# Patient Record
Sex: MALE | Race: OTHER | HISPANIC OR LATINO | URBAN - METROPOLITAN AREA
[De-identification: names, ages, dates, MRNs, and addresses within clinical notes are randomized per-mention and may not be internally consistent; named-entity substitution may affect disease eponyms.]

---

## 2021-05-26 ENCOUNTER — INPATIENT (INPATIENT)
Facility: HOSPITAL | Age: 71
LOS: 1 days | Discharge: HOME | DRG: 439 | End: 2021-05-28
Attending: STUDENT IN AN ORGANIZED HEALTH CARE EDUCATION/TRAINING PROGRAM | Admitting: STUDENT IN AN ORGANIZED HEALTH CARE EDUCATION/TRAINING PROGRAM
Payer: COMMERCIAL

## 2021-05-26 VITALS
OXYGEN SATURATION: 99 % | RESPIRATION RATE: 19 BRPM | TEMPERATURE: 98 F | SYSTOLIC BLOOD PRESSURE: 119 MMHG | HEIGHT: 64.17 IN | DIASTOLIC BLOOD PRESSURE: 73 MMHG | HEART RATE: 56 BPM | WEIGHT: 136.69 LBS

## 2021-05-26 LAB
ALBUMIN SERPL ELPH-MCNC: 3.8 G/DL — SIGNIFICANT CHANGE UP (ref 3.5–5)
ALP SERPL-CCNC: 163 U/L — HIGH (ref 40–120)
ALT FLD-CCNC: 125 U/L DA — HIGH (ref 10–60)
ANION GAP SERPL CALC-SCNC: 5 MMOL/L — SIGNIFICANT CHANGE UP (ref 5–17)
APPEARANCE UR: CLEAR — SIGNIFICANT CHANGE UP
AST SERPL-CCNC: 187 U/L — HIGH (ref 10–40)
BACTERIA # UR AUTO: ABNORMAL /HPF
BASOPHILS # BLD AUTO: 0.04 K/UL — SIGNIFICANT CHANGE UP (ref 0–0.2)
BASOPHILS NFR BLD AUTO: 0.4 % — SIGNIFICANT CHANGE UP (ref 0–2)
BILIRUB SERPL-MCNC: 0.6 MG/DL — SIGNIFICANT CHANGE UP (ref 0.2–1.2)
BILIRUB UR-MCNC: NEGATIVE — SIGNIFICANT CHANGE UP
BUN SERPL-MCNC: 21 MG/DL — HIGH (ref 7–18)
CALCIUM SERPL-MCNC: 9.4 MG/DL — SIGNIFICANT CHANGE UP (ref 8.4–10.5)
CHLORIDE SERPL-SCNC: 103 MMOL/L — SIGNIFICANT CHANGE UP (ref 96–108)
CO2 SERPL-SCNC: 30 MMOL/L — SIGNIFICANT CHANGE UP (ref 22–31)
COLOR SPEC: YELLOW — SIGNIFICANT CHANGE UP
CREAT SERPL-MCNC: 0.78 MG/DL — SIGNIFICANT CHANGE UP (ref 0.5–1.3)
DIFF PNL FLD: ABNORMAL
EOSINOPHIL # BLD AUTO: 0.04 K/UL — SIGNIFICANT CHANGE UP (ref 0–0.5)
EOSINOPHIL NFR BLD AUTO: 0.4 % — SIGNIFICANT CHANGE UP (ref 0–6)
EPI CELLS # UR: ABNORMAL /HPF
GLUCOSE SERPL-MCNC: 131 MG/DL — HIGH (ref 70–99)
GLUCOSE UR QL: NEGATIVE — SIGNIFICANT CHANGE UP
HCT VFR BLD CALC: 41.1 % — SIGNIFICANT CHANGE UP (ref 39–50)
HGB BLD-MCNC: 13.3 G/DL — SIGNIFICANT CHANGE UP (ref 13–17)
IMM GRANULOCYTES NFR BLD AUTO: 0.4 % — SIGNIFICANT CHANGE UP (ref 0–1.5)
KETONES UR-MCNC: NEGATIVE — SIGNIFICANT CHANGE UP
LACTATE SERPL-SCNC: 1.2 MMOL/L — SIGNIFICANT CHANGE UP (ref 0.7–2)
LEUKOCYTE ESTERASE UR-ACNC: NEGATIVE — SIGNIFICANT CHANGE UP
LIDOCAIN IGE QN: HIGH U/L (ref 73–393)
LYMPHOCYTES # BLD AUTO: 1.72 K/UL — SIGNIFICANT CHANGE UP (ref 1–3.3)
LYMPHOCYTES # BLD AUTO: 16.2 % — SIGNIFICANT CHANGE UP (ref 13–44)
MCHC RBC-ENTMCNC: 28.5 PG — SIGNIFICANT CHANGE UP (ref 27–34)
MCHC RBC-ENTMCNC: 32.4 GM/DL — SIGNIFICANT CHANGE UP (ref 32–36)
MCV RBC AUTO: 88 FL — SIGNIFICANT CHANGE UP (ref 80–100)
MONOCYTES # BLD AUTO: 1.05 K/UL — HIGH (ref 0–0.9)
MONOCYTES NFR BLD AUTO: 9.9 % — SIGNIFICANT CHANGE UP (ref 2–14)
NEUTROPHILS # BLD AUTO: 7.7 K/UL — HIGH (ref 1.8–7.4)
NEUTROPHILS NFR BLD AUTO: 72.7 % — SIGNIFICANT CHANGE UP (ref 43–77)
NITRITE UR-MCNC: NEGATIVE — SIGNIFICANT CHANGE UP
NRBC # BLD: 0 /100 WBCS — SIGNIFICANT CHANGE UP (ref 0–0)
PH UR: 6.5 — SIGNIFICANT CHANGE UP (ref 5–8)
PLATELET # BLD AUTO: 251 K/UL — SIGNIFICANT CHANGE UP (ref 150–400)
POTASSIUM SERPL-MCNC: 4.5 MMOL/L — SIGNIFICANT CHANGE UP (ref 3.5–5.3)
POTASSIUM SERPL-SCNC: 4.5 MMOL/L — SIGNIFICANT CHANGE UP (ref 3.5–5.3)
PROT SERPL-MCNC: 9.3 G/DL — HIGH (ref 6–8.3)
PROT UR-MCNC: NEGATIVE — SIGNIFICANT CHANGE UP
RBC # BLD: 4.67 M/UL — SIGNIFICANT CHANGE UP (ref 4.2–5.8)
RBC # FLD: 13.4 % — SIGNIFICANT CHANGE UP (ref 10.3–14.5)
RBC CASTS # UR COMP ASSIST: ABNORMAL /HPF (ref 0–2)
SODIUM SERPL-SCNC: 138 MMOL/L — SIGNIFICANT CHANGE UP (ref 135–145)
SP GR SPEC: 1.01 — SIGNIFICANT CHANGE UP (ref 1.01–1.02)
TROPONIN I SERPL-MCNC: <0.015 NG/ML — SIGNIFICANT CHANGE UP (ref 0–0.04)
UROBILINOGEN FLD QL: 1
WBC # BLD: 10.59 K/UL — HIGH (ref 3.8–10.5)
WBC # FLD AUTO: 10.59 K/UL — HIGH (ref 3.8–10.5)
WBC UR QL: SIGNIFICANT CHANGE UP /HPF (ref 0–5)

## 2021-05-26 PROCEDURE — 76705 ECHO EXAM OF ABDOMEN: CPT | Mod: 26

## 2021-05-26 PROCEDURE — 93010 ELECTROCARDIOGRAM REPORT: CPT

## 2021-05-26 PROCEDURE — 99285 EMERGENCY DEPT VISIT HI MDM: CPT

## 2021-05-26 PROCEDURE — 99223 1ST HOSP IP/OBS HIGH 75: CPT

## 2021-05-26 RX ORDER — SODIUM CHLORIDE 9 MG/ML
1000 INJECTION INTRAMUSCULAR; INTRAVENOUS; SUBCUTANEOUS ONCE
Refills: 0 | Status: COMPLETED | OUTPATIENT
Start: 2021-05-26 | End: 2021-05-26

## 2021-05-26 RX ORDER — ONDANSETRON 8 MG/1
4 TABLET, FILM COATED ORAL ONCE
Refills: 0 | Status: COMPLETED | OUTPATIENT
Start: 2021-05-26 | End: 2021-05-26

## 2021-05-26 RX ORDER — MORPHINE SULFATE 50 MG/1
4 CAPSULE, EXTENDED RELEASE ORAL ONCE
Refills: 0 | Status: DISCONTINUED | OUTPATIENT
Start: 2021-05-26 | End: 2021-05-26

## 2021-05-26 RX ADMIN — MORPHINE SULFATE 4 MILLIGRAM(S): 50 CAPSULE, EXTENDED RELEASE ORAL at 23:00

## 2021-05-26 RX ADMIN — SODIUM CHLORIDE 1000 MILLILITER(S): 9 INJECTION INTRAMUSCULAR; INTRAVENOUS; SUBCUTANEOUS at 21:03

## 2021-05-26 RX ADMIN — ONDANSETRON 4 MILLIGRAM(S): 8 TABLET, FILM COATED ORAL at 21:03

## 2021-05-26 RX ADMIN — SODIUM CHLORIDE 1000 MILLILITER(S): 9 INJECTION INTRAMUSCULAR; INTRAVENOUS; SUBCUTANEOUS at 23:00

## 2021-05-26 RX ADMIN — MORPHINE SULFATE 4 MILLIGRAM(S): 50 CAPSULE, EXTENDED RELEASE ORAL at 22:27

## 2021-05-26 NOTE — ED PROVIDER NOTE - CLINICAL SUMMARY MEDICAL DECISION MAKING FREE TEXT BOX
69 yo M with upper abdo pain, pancreatitis vs biliary colic vs other. Plan - labs, meds, imaging, reassess.

## 2021-05-26 NOTE — H&P ADULT - PROBLEM SELECTOR PLAN 1
p/w abd pain, lipase : >30k  US abd: no gallstones    Pt doesn't take any meds at home  Pt quit alcohol 5 years ago, previously had beer occasionally.  autoimmune vs idiopathic  f/u CT abd  c/w NPO for now  c/w IVF @ 250cc/h x 24 hrs  pain control with tylenol. oxycodon, morphine  zofran PRN for n/v  Reassess and advance diet as tolerated p/w abd pain, lipase : >30k  US abd: no gallstones    Pt doesn't take any meds at home  Pt quit alcohol 5 years ago, previously had beer occasionally.  autoimmune vs idiopathic  CT abd w/ IV cont: Acute interstitial pancreatitis. No organized collection or evidence of necrosis  c/w NPO for now  c/w IVF @ 250cc/h x 24 hrs  pain control with tylenol. oxycodon, morphine  zofran PRN for n/v  Reassess and advance diet as tolerated

## 2021-05-26 NOTE — H&P ADULT - HISTORY OF PRESENT ILLNESS
69 yo M denies pmh and psh presents with upper abdo pain x 10 days, worsening x 3 days, radiating to RUQ with n/v. Denies other acute complaints. Arabic translation via family.  US abd: Normal right upper quadrant ultrasound. No gallstones   69 yo Malawian speaking M with no PMH and PSH (pt has never seen MD for [place] since [place] came to [place]) presents with lower abdo pain x 10 days, worsening these 3 days. Pt states 10 days ago lowe abd pain was started mainly in the monring, and it subsided during the day. since these 3 days pain got worse, and now pain is constant since yesterday (5/25) 3pm till now. Pain is solange-like pain, 9/10, no radiation. He vomited 4 times today (no blood).  Denies diarrhea, Cp, fever or other acute complaints.   Pt doesn't take any medications at home. Pt quit alcohol 5 years ago, previously had beer occasionally.     US abd: Normal right upper quadrant ultrasound. No gallstones

## 2021-05-26 NOTE — H&P ADULT - NSHPSOCIALHISTORY_GEN_ALL_CORE
Pt denied smoking, alcohol use or illicit drug use. Pt quit alcohol 5 years ago, previously had beer occasionally.

## 2021-05-26 NOTE — ED PROVIDER NOTE - PROGRESS NOTE DETAILS
labs - lipase >30K, elevated LFTs and alk phos, bili wnl  EKG - sinus bradycardia   Will sign out to Dr Arana to fu CT and admit labs - lipase >30K, elevated LFTs and alk phos, bili wnl  EKG - sinus bradycardia   Will sign out to Dr Arana to fu CT and admit to Dr River   Endorsed to KIM hobson

## 2021-05-26 NOTE — H&P ADULT - PROBLEM SELECTOR PLAN 3
IMPROVE VTE Individual Risk Assessment  RISK                                                                Points  [  ] Previous VTE                                                  3  [  ] Thrombophilia                                               2  [  ] Lower limb paralysis                                      2        (unable to hold up >15 seconds)    [  ] Current Cancer                                              2         (within 6 months)  [ x ] Immobilization > 24 hrs                                1  [  ] ICU/CCU stay > 24 hours                              1  [ x ] Age > 60                                                      1  IMPROVE VTE Score ___2______  Lovenox for DVT prophylaxis

## 2021-05-26 NOTE — ED PROVIDER NOTE - PHYSICAL EXAMINATION
GENERAL: well appearing, no acute distress   HEAD: atraumatic   EYES: EOMI, pink conjunctiva   ENT: moist oral mucosa   CARDIAC: RRR, no edema, distal pulses present   RESPIRATORY: lungs CTAB, no increased work of breathing   GASTROINTESTINAL: +upper abdominal tenderness, no rebound or guarding, bowel sounds presents  GENITOURINARY: no CVA tenderness   MUSCULOSKELETAL: no deformity   NEUROLOGICAL: AAOx3, spontaneous movement of extremities   SKIN: intact   PSYCHIATRIC: cooperative  HEME LYMPH: no lymphadenopathy

## 2021-05-26 NOTE — ED PROVIDER NOTE - OBJECTIVE STATEMENT
71 yo M denies pmh and psh presents with upper abdo pain x 10 days, worsening x 3 days, radiating to RUQ with n/v. Denies other acute complaints. Persian translation via family.

## 2021-05-26 NOTE — H&P ADULT - ATTENDING COMMENTS
Pt seen and examined  Case discussed with MAR.  70 year old man with no known medical hx who presented to the ED with > 1 week of epigastric and RUQ pain and multiple episodes of non bloody emesis. No fevers.    Vital Signs Last 24 Hrs  T(C): 36.8 (26 May 2021 23:15), Max: 36.8 (26 May 2021 23:15)  T(F): 98.2 (26 May 2021 23:15), Max: 98.2 (26 May 2021 23:15)  HR: 67 (26 May 2021 23:15) (56 - 67)  BP: 108/67 (26 May 2021 23:15) (108/67 - 119/73)  RR: 18 (26 May 2021 23:15) (18 - 19)  SpO2: 96% (26 May 2021 23:15) (96% - 99%)    Labs                         13.3   10.59 )-----------( 251      ( 26 May 2021 21:08 )             41.1     05-26    138  |  103  |  21<H>  ----------------------------<  131<H>  4.5   |  30  |  0.78    Ca    9.4      26 May 2021 21:08    TPro  9.3<H>  /  Alb  3.8  /  TBili  0.6  /  DBili  x   /  AST  187<H>  /  ALT  125<H>  /  AlkPhos  163<H>  05-26    lipase -  > 96948  trig - 164    Urinalysis Basic - ( 26 May 2021 21:08 )  Noted    U/S hepatic /pancreatic  Normal RUQ and unremarkable pancreas    A/P  Acute pancreatitis    - Plan  Admit to Medicine   IVF hydration @ 200cc/hour with lactated ringers  NPO  Pain control   IV antiemetics - Zofran 4mg Iv q 6 hourly  GI consult   Check A1c

## 2021-05-26 NOTE — H&P ADULT - ASSESSMENT
69 yo Tuvaluan speaking M with no PMH and PSH (pt has never seen MD for 23 years since [place] came to [place]) presents with lower abdo pain x 10 days, worsening these 3 days. Pt is admitted for acute pancreatitis.     Lipase: >30k, T--> 81  US abd: Normal right upper quadrant ultrasound. No gallstones           69 yo Maldivian speaking M with no PMH and PSH (pt has never seen MD for 23 years since [place] came to [place]) presents with lower abdo pain x 10 days, worsening these 3 days. Pt is admitted for acute pancreatitis.     Lipase: >30k, T--> 81  US abd: Normal right upper quadrant ultrasound. No gallstones  CT Abdomen and Pelvis w/ IV Cont showed Mild diffuse peripancreatic fluid/stranding. Homogeneous pancreatic enhancement. Acute interstitial pancreatitis. No organized collection or evidence of necrosis.  LIVER: Steatosis.   Likely interstitial lung disease, suboptimally evaluated due to motion.  6 mm linear radiopaque foreign body in the distal transverse colon.

## 2021-05-27 ENCOUNTER — TRANSCRIPTION ENCOUNTER (OUTPATIENT)
Age: 71
End: 2021-05-27

## 2021-05-27 DIAGNOSIS — K85.90 ACUTE PANCREATITIS WITHOUT NECROSIS OR INFECTION, UNSPECIFIED: ICD-10-CM

## 2021-05-27 DIAGNOSIS — Z78.9 OTHER SPECIFIED HEALTH STATUS: Chronic | ICD-10-CM

## 2021-05-27 DIAGNOSIS — Z29.9 ENCOUNTER FOR PROPHYLACTIC MEASURES, UNSPECIFIED: ICD-10-CM

## 2021-05-27 DIAGNOSIS — R74.01 ELEVATION OF LEVELS OF LIVER TRANSAMINASE LEVELS: ICD-10-CM

## 2021-05-27 LAB
24R-OH-CALCIDIOL SERPL-MCNC: 18.2 NG/ML — LOW (ref 30–80)
A1C WITH ESTIMATED AVERAGE GLUCOSE RESULT: 5.9 % — HIGH (ref 4–5.6)
ALBUMIN SERPL ELPH-MCNC: 3 G/DL — LOW (ref 3.5–5)
ALP SERPL-CCNC: 129 U/L — HIGH (ref 40–120)
ALT FLD-CCNC: 94 U/L DA — HIGH (ref 10–60)
AMYLASE P1 CFR SERPL: 1123 U/L — HIGH (ref 25–115)
ANION GAP SERPL CALC-SCNC: 5 MMOL/L — SIGNIFICANT CHANGE UP (ref 5–17)
AST SERPL-CCNC: 80 U/L — HIGH (ref 10–40)
BASOPHILS # BLD AUTO: 0.02 K/UL — SIGNIFICANT CHANGE UP (ref 0–0.2)
BASOPHILS NFR BLD AUTO: 0.3 % — SIGNIFICANT CHANGE UP (ref 0–2)
BILIRUB SERPL-MCNC: 0.4 MG/DL — SIGNIFICANT CHANGE UP (ref 0.2–1.2)
BUN SERPL-MCNC: 18 MG/DL — SIGNIFICANT CHANGE UP (ref 7–18)
CALCIUM SERPL-MCNC: 8.2 MG/DL — LOW (ref 8.4–10.5)
CHLORIDE SERPL-SCNC: 105 MMOL/L — SIGNIFICANT CHANGE UP (ref 96–108)
CHOLEST SERPL-MCNC: 151 MG/DL — SIGNIFICANT CHANGE UP
CO2 SERPL-SCNC: 29 MMOL/L — SIGNIFICANT CHANGE UP (ref 22–31)
CREAT SERPL-MCNC: 0.65 MG/DL — SIGNIFICANT CHANGE UP (ref 0.5–1.3)
CULTURE RESULTS: SIGNIFICANT CHANGE UP
EOSINOPHIL # BLD AUTO: 0.1 K/UL — SIGNIFICANT CHANGE UP (ref 0–0.5)
EOSINOPHIL NFR BLD AUTO: 1.4 % — SIGNIFICANT CHANGE UP (ref 0–6)
ESTIMATED AVERAGE GLUCOSE: 123 MG/DL — HIGH (ref 68–114)
GGT SERPL-CCNC: 82 U/L — HIGH (ref 9–50)
GLUCOSE SERPL-MCNC: 109 MG/DL — HIGH (ref 70–99)
HCT VFR BLD CALC: 35.3 % — LOW (ref 39–50)
HCV AB S/CO SERPL IA: 0.26 S/CO — SIGNIFICANT CHANGE UP (ref 0–0.99)
HCV AB SERPL-IMP: SIGNIFICANT CHANGE UP
HDLC SERPL-MCNC: 50 MG/DL — SIGNIFICANT CHANGE UP
HGB BLD-MCNC: 11.4 G/DL — LOW (ref 13–17)
IMM GRANULOCYTES NFR BLD AUTO: 0.3 % — SIGNIFICANT CHANGE UP (ref 0–1.5)
LIDOCAIN IGE QN: HIGH U/L (ref 73–393)
LIPID PNL WITH DIRECT LDL SERPL: 87 MG/DL — SIGNIFICANT CHANGE UP
LYMPHOCYTES # BLD AUTO: 1.91 K/UL — SIGNIFICANT CHANGE UP (ref 1–3.3)
LYMPHOCYTES # BLD AUTO: 27.2 % — SIGNIFICANT CHANGE UP (ref 13–44)
MAGNESIUM SERPL-MCNC: 2.1 MG/DL — SIGNIFICANT CHANGE UP (ref 1.6–2.6)
MCHC RBC-ENTMCNC: 28.3 PG — SIGNIFICANT CHANGE UP (ref 27–34)
MCHC RBC-ENTMCNC: 32.3 GM/DL — SIGNIFICANT CHANGE UP (ref 32–36)
MCV RBC AUTO: 87.6 FL — SIGNIFICANT CHANGE UP (ref 80–100)
MONOCYTES # BLD AUTO: 0.77 K/UL — SIGNIFICANT CHANGE UP (ref 0–0.9)
MONOCYTES NFR BLD AUTO: 11 % — SIGNIFICANT CHANGE UP (ref 2–14)
NEUTROPHILS # BLD AUTO: 4.21 K/UL — SIGNIFICANT CHANGE UP (ref 1.8–7.4)
NEUTROPHILS NFR BLD AUTO: 59.8 % — SIGNIFICANT CHANGE UP (ref 43–77)
NON HDL CHOLESTEROL: 101 MG/DL — SIGNIFICANT CHANGE UP
NRBC # BLD: 0 /100 WBCS — SIGNIFICANT CHANGE UP (ref 0–0)
PHOSPHATE SERPL-MCNC: 4 MG/DL — SIGNIFICANT CHANGE UP (ref 2.5–4.5)
PLATELET # BLD AUTO: 199 K/UL — SIGNIFICANT CHANGE UP (ref 150–400)
POTASSIUM SERPL-MCNC: 3.8 MMOL/L — SIGNIFICANT CHANGE UP (ref 3.5–5.3)
POTASSIUM SERPL-SCNC: 3.8 MMOL/L — SIGNIFICANT CHANGE UP (ref 3.5–5.3)
PROT SERPL-MCNC: 7.4 G/DL — SIGNIFICANT CHANGE UP (ref 6–8.3)
RBC # BLD: 4.03 M/UL — LOW (ref 4.2–5.8)
RBC # FLD: 13.5 % — SIGNIFICANT CHANGE UP (ref 10.3–14.5)
SARS-COV-2 RNA SPEC QL NAA+PROBE: SIGNIFICANT CHANGE UP
SODIUM SERPL-SCNC: 139 MMOL/L — SIGNIFICANT CHANGE UP (ref 135–145)
SPECIMEN SOURCE: SIGNIFICANT CHANGE UP
TRIGL SERPL-MCNC: 72 MG/DL — SIGNIFICANT CHANGE UP
TRIGL SERPL-MCNC: 84 MG/DL — SIGNIFICANT CHANGE UP
TSH SERPL-MCNC: 2.16 UU/ML — SIGNIFICANT CHANGE UP (ref 0.34–4.82)
VIT B12 SERPL-MCNC: 396 PG/ML — SIGNIFICANT CHANGE UP (ref 232–1245)
WBC # BLD: 7.03 K/UL — SIGNIFICANT CHANGE UP (ref 3.8–10.5)
WBC # FLD AUTO: 7.03 K/UL — SIGNIFICANT CHANGE UP (ref 3.8–10.5)

## 2021-05-27 PROCEDURE — 74177 CT ABD & PELVIS W/CONTRAST: CPT | Mod: 26

## 2021-05-27 PROCEDURE — 99233 SBSQ HOSP IP/OBS HIGH 50: CPT | Mod: GC

## 2021-05-27 RX ORDER — ACETAMINOPHEN 500 MG
650 TABLET ORAL EVERY 6 HOURS
Refills: 0 | Status: DISCONTINUED | OUTPATIENT
Start: 2021-05-27 | End: 2021-05-28

## 2021-05-27 RX ORDER — SODIUM CHLORIDE 9 MG/ML
1000 INJECTION, SOLUTION INTRAVENOUS
Refills: 0 | Status: DISCONTINUED | OUTPATIENT
Start: 2021-05-27 | End: 2021-05-28

## 2021-05-27 RX ORDER — OXYCODONE HYDROCHLORIDE 5 MG/1
5 TABLET ORAL EVERY 6 HOURS
Refills: 0 | Status: DISCONTINUED | OUTPATIENT
Start: 2021-05-27 | End: 2021-05-28

## 2021-05-27 RX ORDER — ONDANSETRON 8 MG/1
4 TABLET, FILM COATED ORAL EVERY 6 HOURS
Refills: 0 | Status: DISCONTINUED | OUTPATIENT
Start: 2021-05-27 | End: 2021-05-28

## 2021-05-27 RX ORDER — MORPHINE SULFATE 50 MG/1
4 CAPSULE, EXTENDED RELEASE ORAL EVERY 6 HOURS
Refills: 0 | Status: DISCONTINUED | OUTPATIENT
Start: 2021-05-27 | End: 2021-05-28

## 2021-05-27 RX ORDER — ENOXAPARIN SODIUM 100 MG/ML
40 INJECTION SUBCUTANEOUS DAILY
Refills: 0 | Status: DISCONTINUED | OUTPATIENT
Start: 2021-05-27 | End: 2021-05-28

## 2021-05-27 RX ADMIN — SODIUM CHLORIDE 250 MILLILITER(S): 9 INJECTION, SOLUTION INTRAVENOUS at 08:53

## 2021-05-27 RX ADMIN — ENOXAPARIN SODIUM 40 MILLIGRAM(S): 100 INJECTION SUBCUTANEOUS at 11:27

## 2021-05-27 RX ADMIN — OXYCODONE HYDROCHLORIDE 5 MILLIGRAM(S): 5 TABLET ORAL at 03:00

## 2021-05-27 RX ADMIN — Medication 650 MILLIGRAM(S): at 08:53

## 2021-05-27 RX ADMIN — SODIUM CHLORIDE 250 MILLILITER(S): 9 INJECTION, SOLUTION INTRAVENOUS at 02:53

## 2021-05-27 RX ADMIN — Medication 650 MILLIGRAM(S): at 09:10

## 2021-05-27 NOTE — DISCHARGE NOTE PROVIDER - PROVIDER TOKENS
PROVIDER:[TOKEN:[[id number]],FOLLOWUP:[1 week]] PROVIDER:[TOKEN:[[id number]],FOLLOWUP:[1 week],ESTABLISHEDPATIENT:[T]],PROVIDER:[TOKEN:[[id number]],FOLLOWUP:[1 week],ESTABLISHEDPATIENT:[T]] PROVIDER:[TOKEN:[[id number]],FOLLOWUP:[1 week],ESTABLISHEDPATIENT:[T]],PROVIDER:[TOKEN:[[id number]],FOLLOWUP:[1 week]]

## 2021-05-27 NOTE — DISCHARGE NOTE PROVIDER - HOSPITAL COURSE
71 yo Surinamese speaking M with no PMH and PSH (pt has never seen MD for 23 years since [place] came to [place]) presents with lower abd pain.   Admitted to medicine for acute pancreatitis. US with no evidence of gallstone, does not take any medications at home   CT abd showed  showed acute interstitial pancreatitis. No organized collection or evidence of necrosis.  started on IVF, NPO and pain mgmt.   abd pain improving, Lipase trended down, diet advance and pt tolerating     NOT COMPLETE 5/27  Please note that this a brief summary of hospital course please refer to daily progress notes and consult notes for full course and events          70M, [place] Islander-speaking, non-drinking, no PMH/PSH or established medical care presented 5/26 with x4 episodes NBNB vomiting in setting of x10 days total and x3 days acutely worsening lower abdominal contraction-like pain, first occuring in the am and later becoming constant. In ED, /55, VS otherwise wnl. Adm WBC 11, , AST//125, lipase 30K, amylase 1123, GGT 82. Adm , later wnl, remainder of lipid panel wnl. UA with blood, no infx. EKG sinus jessica. RUQ US unremarkable. CT a/p with acute pancreatitis. Given x1 dose each morphine and zofran and 1L IVF bolus. Admitted to F for treatment of pancreatitis.     B12, TSH, lactate, trop x1, COVID, UCx wnl, vit low 18, a1c 5.9.    S/p x24hrs LR 250cc/hr, followed by x12 hrs 100 cc/hr. Treated with zofran and pain medications. IgG subsets testing, will recommend GI follow up outpatient. WBC resolved, lipase downtrending. Started weekly vit D. Pain improving 5/28, medically stable for discharge.

## 2021-05-27 NOTE — PROGRESS NOTE ADULT - ATTENDING COMMENTS
Patient has clinically improved significantly   WIll advance diet slowly  Anemia possibly dilutional  No gall stone, h/o alcohol use, OTC medication, calcium and TG normal  Will send IgG level  Patient can follow up as outpatient with GI   Rest of the management as above Patient has clinically improved significantly   WIll advance diet slowly  Anemia possibly dilutional  No gall stone, h/o alcohol use, OTC medication, calcium and TG normal  Will send IgG level  Patient can follow up as outpatient with GI   Rest of the management as above  Discussed the above plan with patient.  used [id number]

## 2021-05-27 NOTE — DISCHARGE NOTE PROVIDER - NSFOLLOWUPCLINICS_GEN_ALL_ED_FT
[facility]  Gastroenterology  [address] [place] [address]  Phone: [phone]  Fax: [phone]    [facility]  Internal Medicine  [address] [place] [address]  Phone: [phone]  Fax: [phone]

## 2021-05-27 NOTE — PROGRESS NOTE ADULT - SUBJECTIVE AND OBJECTIVE BOX
Patient is a 70y old  Male who presents with a chief complaint of Acute pancreatitis (26 May 2021 23:47)    Seen and examined using  ID # [id number]    OVERNIGHT EVENTS: no acute events overnight, continue to c/o diffuse abd pain         REVIEW OF SYSTEMS:  CONSTITUTIONAL: No fever, chills  ENMT:  No difficulty hearing, no change in vision  NECK: No pain or stiffness  RESPIRATORY: No cough, SOB  CARDIOVASCULAR: No chest pain, palpitations  GASTROINTESTINAL: +ve abdominal pain. No nausea, vomiting, or diarrhea  GENITOURINARY: No dysuria  NEUROLOGICAL: No HA  SKIN: No itching, burning, rashes, or lesions   MUSCULOSKELETAL: No joint pain or swelling; No muscle, back, or extremity pain  PSYCHIATRIC: No depression, anxiety    T(C): 36.7 (21 @ 07:30), Max: 36.8 (21 @ 23:15)  HR: 60 (21 @ 07:30) (56 - 67)  BP: 98/57 (21 @ 07:30) (98/57 - 119/73)  RR: 18 (21 @ 07:30) (18 - 19)  SpO2: 99% (21 @ 07:30) (96% - 99%)  Wt(kg): --Vital Signs Last 24 Hrs  T(C): 36.7 (27 May 2021 07:30), Max: 36.8 (26 May 2021 23:15)  T(F): 98.1 (27 May 2021 07:30), Max: 98.2 (26 May 2021 23:15)  HR: 60 (27 May 2021 07:30) (56 - 67)  BP: 98/57 (27 May 2021 07:30) (98/57 - 119/73)  BP(mean): --  RR: 18 (27 May 2021 07:30) (18 - 19)  SpO2: 99% (27 May 2021 07:30) (96% - 99%)    MEDICATIONS  (STANDING):  enoxaparin Injectable 40 milliGRAM(s) SubCutaneous daily  lactated ringers. 1000 milliLiter(s) (250 mL/Hr) IV Continuous <Continuous>    MEDICATIONS  (PRN):  acetaminophen   Tablet .. 650 milliGRAM(s) Oral every 6 hours PRN Mild Pain (1 - 3)  morphine  - Injectable 4 milliGRAM(s) IV Push every 6 hours PRN Severe Pain (7 - 10)  ondansetron Injectable 4 milliGRAM(s) IV Push every 6 hours PRN Nausea and/or Vomiting  oxyCODONE    IR 5 milliGRAM(s) Oral every 6 hours PRN Moderate Pain (4 - 6)      PHYSICAL EXAM:  GENERAL: NAD  EYES: clear conjunctiva  ENMT: Moist mucous membranes  NECK: Supple, No JVD  CHEST/LUNG: Clear to auscultation bilaterally; No rales, rhonchi, wheezing, or rubs  HEART: S1, S2, Regular rate and rhythm  ABDOMEN: Soft, +ve diffuse tenderness,  Nondistended; Bowel sounds present  NEURO: Alert & Oriented X3  EXTREMITIES: No LE edema, no calf tenderness  SKIN: No rashes or lesions    Consultant(s) Notes Reviewed:  [x ] YES  [ ] NO  Care Discussed with Consultants/Other Providers [ x] YES  [ ] NO    LABS:                        11.4   7.03  )-----------( 199      ( 27 May 2021 05:28 )             35.3         139  |  105  |  18  ----------------------------<  109<H>  3.8   |  29  |  0.65    Ca    8.2<L>      27 May 2021 05:28  Phos  4.0       Mg     2.1         TPro  7.4  /  Alb  3.0<L>  /  TBili  0.4  /  DBili  x   /  AST  80<H>  /  ALT  94<H>  /  AlkPhos  129<H>        CAPILLARY BLOOD GLUCOSE  Urinalysis Basic - ( 26 May 2021 21:08 )    Color: Yellow / Appearance: Clear / S.010 / pH: x  Gluc: x / Ketone: Negative  / Bili: Negative / Urobili: 1   Blood: x / Protein: Negative / Nitrite: Negative   Leuk Esterase: Negative / RBC: 10-25 /HPF / WBC 0-2 /HPF   Sq Epi: x / Non Sq Epi: Occasional /HPF / Bacteria: Trace /HPF    RADIOLOGY & ADDITIONAL TESTS:    < from: CT Abdomen and Pelvis w/ IV Cont (21 @ 04:30) >    EXAM:  CT ABDOMEN AND PELVIS IC                            PROCEDURE DATE:  2021          INTERPRETATION:  CLINICAL INFORMATION: Pancreatitis.    COMPARISON: Same day ultrasound    CONTRAST/COMPLICATIONS:  IV Contrast: Omnipaque 350  95 cc administered   5 cc discarded  Oral Contrast: NONE  Complications: None reported at time of study completion    PROCEDURE:  CT of the Abdomen and Pelvis was performed.  Sagittal and coronal reformats were performed.    FINDINGS:  Exam is mildly degradedby motion artifact.    LOWER CHEST: Limited by motion. Bilateral reticulation suggestive of interstitial disease. Mild bilateral hilar adenopathy.    LIVER: Steatosis. 1.2 cm left hepatic lobe cyst.  BILE DUCTS: Normal caliber.  GALLBLADDER: Mildly distended. No definite stones.  SPLEEN: Within normal limits.  PANCREAS: Mild diffuse peripancreatic fluid/stranding. Homogeneous pancreatic enhancement. No organized collection.  ADRENALS: Within normal limits.  KIDNEYS/URETERS: No hydronephrosis. Homogeneous symmetric renal enhancement. 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    BLADDER: Minimally distended.  REPRODUCTIVE ORGANS: Enlarged prostate with protrusion into the bladder base.    BOWEL: No bowel obstruction. Appendix is normal. 6 mm radiographic linear density in the distal transverse colon (2, 35), likely ingested foreign body.  PERITONEUM: No ascites.  VESSELS: Atherosclerotic changes.  RETROPERITONEUM/LYMPH NODES: No lymphadenopathy.  ABDOMINAL WALL: Within normal limits.  BONES: Degenerative changes of the spine most pronounced at L5/S1.    IMPRESSION:  Acute interstitial pancreatitis. No organized collection or evidence of necrosis.    Likely interstitial lung disease, suboptimally evaluated due to motion.    6 mm linear radiopaque foreign body in the distal transverse colon.        < end of copied text >    < from: US Hepatic & Pancreatic (21 @ 22:26) >    EXAM:  US LIVER AND PANCREAS                            PROCEDURE DATE:  2021          INTERPRETATION:  EXAM: US LIVER AND PANCREAS  INDICATION: RUQ pain; eval for cholecystitis.  COMPARISON: None.    TECHNIQUE: Grayscale ultrasound of the right upper quadrant was performed.    FINDINGS:  Liver: Hepatic steatosis.    Bile ducts: No intra or extrahepatic biliary ductal dilatation. Common duct = 7 mm.    Gallbladder: No stones, pericholecystic fluid, or gallbladder wall thickening. No sonographic Owens's sign was elicited by the sonographer.    Pancreas: Visualized portions are grossly unremarkable.    Right kidney: Measures 9.7 cm. No hydronephrosis.    Peritoneum: No ascites detected.    Proximal Aorta & IVC: Visualized abdominal aorta and IVC are grossly unremarkable.    IMPRESSION:  Normal right upper quadrant ultrasound.    < end of copied text >        Imaging Personally Reviewed:  [ ] YES  [ ] NO

## 2021-05-27 NOTE — DISCHARGE NOTE PROVIDER - CARE PROVIDER_API CALL
TISH JOHNSON  Pediatrics  [address] [place] [address]  Phone: [phone]  Fax: [phone]  Follow Up Time: 1 week   Bertram De La Torre)  Gastroenterology  [address] [place] [address]  Phone: [phone]  Fax: [phone]  Established Patient  Follow Up Time: 1 week    Eder Castillo)  Internal Medicine  [address] [place] [address]  Phone: [phone]  Fax: [phone]  Established Patient  Follow Up Time: 1 week   Bertram De La Torre)  Gastroenterology  [address] [place] [address]  Phone: [phone]  Fax: [phone]  Established Patient  Follow Up Time: 1 week    Eder Castillo)  Internal Medicine  [address] [place] [address]  Phone: [phone]  Fax: [phone]  Follow Up Time: 1 week

## 2021-05-27 NOTE — PROGRESS NOTE ADULT - PROBLEM SELECTOR PLAN 1
-abd pain, nausea in setting of acute pancreatitis autoimmune vs idiopathic   -US with no evidence of gallstone, does not take any medications at home   -CT abd and US as above   -cont NPO, IVF and pain meds   -if abd pain improves, will advance diet   -cont PRN Zofran

## 2021-05-27 NOTE — DISCHARGE NOTE PROVIDER - NSDCCPCAREPLAN_GEN_ALL_CORE_FT
PRINCIPAL DISCHARGE DIAGNOSIS  Diagnosis: Acute pancreatitis  Assessment and Plan of Treatment: You presented to hospital with abdominal pain which was due to pancreatitis which is inflammation of pancreas.  CT scan of abdomen was done and showed that you have acute pancreatitis. you were started on IV fluids, pain medications and bowel rest. your symptoms improved and you were started on oral food which you were able to tolerate.  If your pain starts again, you should start off with drinking only clear liquids, such as soup broth or gelatin. You will need to follow this diet until your symptoms get better. Slowly add other foods back to your diet when you are better.  -Eat a healthy diet that is low in fat  -Eat foods that are high in protein and carbohydrates, but low in fat. --Eat smaller meals, and eat more often.   -stop drinking alcohol   Managing Pain:   Avoiding alcohol and foods that make your symptoms worse is the first step to controlling pain.  -Use acetaminophen (Tylenol) or nonsteroidal anti-inflammatory drugs, such as ibuprofen (Advil, Motrin), at first to try and control your pain.  -seek medical attention if your symptoms do not improve or worsen on current treatment  Follow up with PCP and GI specialist within 1 week         PRINCIPAL DISCHARGE DIAGNOSIS  Diagnosis: Acute pancreatitis  Assessment and Plan of Treatment: You came to the hospital with severe abdominal pain and were found to have inflammation of your pancreas, a digestive organ near your liver. You were treated with fluids, bowel rest, and pain medication, with improvement of your condition.   At home, continue to eat small, non-fatty meals for the next several days to avoid triggering recurrence of pain. You may use Tylenol or ibuprofen over the counter to treat any ongoing pain. Be sure to follow all package directions.   Pancreatitis is often caused by gallstones, alcohol, high cholesterol, and medications, but you do not have any of these risk factors, suggesting that your condition may have been caused by autoimmune damage, in which your own immune system attacks your body. We have sent tests to the lab to determine if this is the cause of your pancreatitis. Follow up with a gastroenterologist (GI specialist) within 1 WEEK of discharge to discuss the results of these tests and assess for recovery.      SECONDARY DISCHARGE DIAGNOSES  Diagnosis: Pre-diabetes  Assessment and Plan of Treatment: This admission, your a1c, a measure of your blood sugar over time, was slightly elevated, suggesting that you are at increased risk of developing diabetes. Diabetes is a disease in which your body is unable to control your blood sugar, increasing the risks of heart disease, strokes, vision loss, neurological damage, and numerous other issues. We strongly recommend that you begin seeing primary care Dr. DOMÍNGUEZ to establish care and monitor this and other risk factors over time.   Lifestyle changes can help reduce your risk of diabetes. A diet rich in fiber (fruits and vegetables), low-fat dairy, poultry/fish (in place of red meat), nuts, and whole grains, with low sugar and less than 1.5 grams of salt per day, can be have numerous health benefits, including reducing the risk of diabetes. We also recommend exercising at least 30 minutes a day, 5 days a week.    Diagnosis: Vitamin D deficiency  Assessment and Plan of Treatment: This admission, you were found to have very low vitamin D levels. Vitamin D is needed for bone health, mental health, and numerous other aspects of your general health.   We recommend that you replace your levels with 50,000 UNITS of VITAMIN D (ergocalciferol) ONCE A WEEK for 7 WEEKS after discharge to bring your vitamin D to the appropriate level.   After 7 weeks, your primary care provider can change your supplementation to a lower, daily dose. Do not take more than the prescribed amount of vitamin D, as too much can be toxic.    Diagnosis: ILD (interstitial lung disease)  Assessment and Plan of Treatment: Imaging this admission showed possible lung disease, which, like your pancreatitis, may be from your own immune system. During the above-recommended follow up appointment with Dr. DOMÍNGUEZ, discuss this finding and plan for further monitoring as appropriate.

## 2021-05-28 ENCOUNTER — TRANSCRIPTION ENCOUNTER (OUTPATIENT)
Age: 71
End: 2021-05-28

## 2021-05-28 VITALS
OXYGEN SATURATION: 98 % | HEART RATE: 59 BPM | DIASTOLIC BLOOD PRESSURE: 65 MMHG | TEMPERATURE: 98 F | SYSTOLIC BLOOD PRESSURE: 110 MMHG | RESPIRATION RATE: 18 BRPM

## 2021-05-28 DIAGNOSIS — R73.03 PREDIABETES: ICD-10-CM

## 2021-05-28 DIAGNOSIS — E55.9 VITAMIN D DEFICIENCY, UNSPECIFIED: ICD-10-CM

## 2021-05-28 DIAGNOSIS — R93.89 ABNORMAL FINDINGS ON DIAGNOSTIC IMAGING OF OTHER SPECIFIED BODY STRUCTURES: ICD-10-CM

## 2021-05-28 LAB
ALBUMIN SERPL ELPH-MCNC: 3 G/DL — LOW (ref 3.5–5)
ALP SERPL-CCNC: 106 U/L — SIGNIFICANT CHANGE UP (ref 40–120)
ALT FLD-CCNC: 58 U/L DA — SIGNIFICANT CHANGE UP (ref 10–60)
ANION GAP SERPL CALC-SCNC: 1 MMOL/L — LOW (ref 5–17)
AST SERPL-CCNC: 29 U/L — SIGNIFICANT CHANGE UP (ref 10–40)
BILIRUB SERPL-MCNC: 0.5 MG/DL — SIGNIFICANT CHANGE UP (ref 0.2–1.2)
BUN SERPL-MCNC: 9 MG/DL — SIGNIFICANT CHANGE UP (ref 7–18)
CALCIUM SERPL-MCNC: 8.9 MG/DL — SIGNIFICANT CHANGE UP (ref 8.4–10.5)
CHLORIDE SERPL-SCNC: 108 MMOL/L — SIGNIFICANT CHANGE UP (ref 96–108)
CO2 SERPL-SCNC: 31 MMOL/L — SIGNIFICANT CHANGE UP (ref 22–31)
COVID-19 SPIKE DOMAIN AB INTERP: POSITIVE
COVID-19 SPIKE DOMAIN ANTIBODY RESULT: >250 U/ML — HIGH
CREAT SERPL-MCNC: 0.52 MG/DL — SIGNIFICANT CHANGE UP (ref 0.5–1.3)
GLUCOSE SERPL-MCNC: 88 MG/DL — SIGNIFICANT CHANGE UP (ref 70–99)
HCT VFR BLD CALC: 35.4 % — LOW (ref 39–50)
HGB BLD-MCNC: 11.3 G/DL — LOW (ref 13–17)
LIDOCAIN IGE QN: 653 U/L — HIGH (ref 73–393)
MCHC RBC-ENTMCNC: 28.5 PG — SIGNIFICANT CHANGE UP (ref 27–34)
MCHC RBC-ENTMCNC: 31.9 GM/DL — LOW (ref 32–36)
MCV RBC AUTO: 89.4 FL — SIGNIFICANT CHANGE UP (ref 80–100)
NRBC # BLD: 0 /100 WBCS — SIGNIFICANT CHANGE UP (ref 0–0)
PLATELET # BLD AUTO: 209 K/UL — SIGNIFICANT CHANGE UP (ref 150–400)
POTASSIUM SERPL-MCNC: 3.9 MMOL/L — SIGNIFICANT CHANGE UP (ref 3.5–5.3)
POTASSIUM SERPL-SCNC: 3.9 MMOL/L — SIGNIFICANT CHANGE UP (ref 3.5–5.3)
PROT SERPL-MCNC: 7.2 G/DL — SIGNIFICANT CHANGE UP (ref 6–8.3)
RBC # BLD: 3.96 M/UL — LOW (ref 4.2–5.8)
RBC # FLD: 13.5 % — SIGNIFICANT CHANGE UP (ref 10.3–14.5)
SARS-COV-2 IGG+IGM SERPL QL IA: >250 U/ML — HIGH
SARS-COV-2 IGG+IGM SERPL QL IA: POSITIVE
SODIUM SERPL-SCNC: 140 MMOL/L — SIGNIFICANT CHANGE UP (ref 135–145)
WBC # BLD: 7.31 K/UL — SIGNIFICANT CHANGE UP (ref 3.8–10.5)
WBC # FLD AUTO: 7.31 K/UL — SIGNIFICANT CHANGE UP (ref 3.8–10.5)

## 2021-05-28 PROCEDURE — 36415 COLL VENOUS BLD VENIPUNCTURE: CPT

## 2021-05-28 PROCEDURE — 87086 URINE CULTURE/COLONY COUNT: CPT

## 2021-05-28 PROCEDURE — 99239 HOSP IP/OBS DSCHRG MGMT >30: CPT | Mod: GC

## 2021-05-28 PROCEDURE — 99285 EMERGENCY DEPT VISIT HI MDM: CPT | Mod: 25

## 2021-05-28 PROCEDURE — 74177 CT ABD & PELVIS W/CONTRAST: CPT

## 2021-05-28 PROCEDURE — 86803 HEPATITIS C AB TEST: CPT

## 2021-05-28 PROCEDURE — 83735 ASSAY OF MAGNESIUM: CPT

## 2021-05-28 PROCEDURE — 85027 COMPLETE CBC AUTOMATED: CPT

## 2021-05-28 PROCEDURE — 84100 ASSAY OF PHOSPHORUS: CPT

## 2021-05-28 PROCEDURE — 82607 VITAMIN B-12: CPT

## 2021-05-28 PROCEDURE — 84443 ASSAY THYROID STIM HORMONE: CPT

## 2021-05-28 PROCEDURE — 83690 ASSAY OF LIPASE: CPT

## 2021-05-28 PROCEDURE — 82977 ASSAY OF GGT: CPT

## 2021-05-28 PROCEDURE — 84478 ASSAY OF TRIGLYCERIDES: CPT

## 2021-05-28 PROCEDURE — 96374 THER/PROPH/DIAG INJ IV PUSH: CPT

## 2021-05-28 PROCEDURE — 96375 TX/PRO/DX INJ NEW DRUG ADDON: CPT

## 2021-05-28 PROCEDURE — 82306 VITAMIN D 25 HYDROXY: CPT

## 2021-05-28 PROCEDURE — 86769 SARS-COV-2 COVID-19 ANTIBODY: CPT

## 2021-05-28 PROCEDURE — 81001 URINALYSIS AUTO W/SCOPE: CPT

## 2021-05-28 PROCEDURE — 83036 HEMOGLOBIN GLYCOSYLATED A1C: CPT

## 2021-05-28 PROCEDURE — 86900 BLOOD TYPING SEROLOGIC ABO: CPT

## 2021-05-28 PROCEDURE — 84484 ASSAY OF TROPONIN QUANT: CPT

## 2021-05-28 PROCEDURE — 80053 COMPREHEN METABOLIC PANEL: CPT

## 2021-05-28 PROCEDURE — 93005 ELECTROCARDIOGRAM TRACING: CPT

## 2021-05-28 PROCEDURE — 76705 ECHO EXAM OF ABDOMEN: CPT

## 2021-05-28 PROCEDURE — 82150 ASSAY OF AMYLASE: CPT

## 2021-05-28 PROCEDURE — 83605 ASSAY OF LACTIC ACID: CPT

## 2021-05-28 PROCEDURE — 86850 RBC ANTIBODY SCREEN: CPT

## 2021-05-28 PROCEDURE — 86901 BLOOD TYPING SEROLOGIC RH(D): CPT

## 2021-05-28 PROCEDURE — 87635 SARS-COV-2 COVID-19 AMP PRB: CPT

## 2021-05-28 PROCEDURE — 85025 COMPLETE CBC W/AUTO DIFF WBC: CPT

## 2021-05-28 PROCEDURE — 80061 LIPID PANEL: CPT

## 2021-05-28 PROCEDURE — 82787 IGG 1 2 3 OR 4 EACH: CPT

## 2021-05-28 RX ORDER — ERGOCALCIFEROL 1.25 MG/1
1 CAPSULE ORAL
Qty: 7 | Refills: 0
Start: 2021-05-28 | End: 2021-07-15

## 2021-05-28 RX ORDER — ERGOCALCIFEROL 1.25 MG/1
50000 CAPSULE ORAL
Refills: 0 | Status: DISCONTINUED | OUTPATIENT
Start: 2021-05-28 | End: 2021-05-28

## 2021-05-28 RX ORDER — ERGOCALCIFEROL 1.25 MG/1
1 CAPSULE ORAL
Qty: 8 | Refills: 0
Start: 2021-05-28 | End: 2021-06-26

## 2021-05-28 RX ORDER — SODIUM CHLORIDE 9 MG/ML
1000 INJECTION, SOLUTION INTRAVENOUS
Refills: 0 | Status: DISCONTINUED | OUTPATIENT
Start: 2021-05-28 | End: 2021-05-28

## 2021-05-28 RX ORDER — CHOLECALCIFEROL (VITAMIN D3) 125 MCG
2000 CAPSULE ORAL DAILY
Refills: 0 | Status: DISCONTINUED | OUTPATIENT
Start: 2021-05-28 | End: 2021-05-28

## 2021-05-28 RX ADMIN — SODIUM CHLORIDE 100 MILLILITER(S): 9 INJECTION, SOLUTION INTRAVENOUS at 09:25

## 2021-05-28 RX ADMIN — ENOXAPARIN SODIUM 40 MILLIGRAM(S): 100 INJECTION SUBCUTANEOUS at 12:41

## 2021-05-28 RX ADMIN — ERGOCALCIFEROL 50000 UNIT(S): 1.25 CAPSULE ORAL at 12:41

## 2021-05-28 NOTE — PROGRESS NOTE ADULT - SUBJECTIVE AND OBJECTIVE BOX
PGY-1 Progress Note discussed with attending    PAGER #: [[phone]] TILL 5:00 PM  PLEASE CONTACT ON CALL TEAM:  - On Call Team (Please refer to Leonela) FROM 5:00 PM - 8:30PM  - Nightfloat Team FROM 8:30 -7:30 AM    CHIEF COMPLAINT & BRIEF HOSPITAL COURSE: 70M, Maldivian-speaking, non-drinking, no PMH/PSH or established medical care presented 5/26 with x4 episodes NBNB vomiting in setting of x10 days total and x3 days acutely worsening lower abdominal contraction-like pain, first occuring in the am and later becoming constant. In ED, /55, VS otherwise wnl. Adm WBC 11, , AST//125, lipase 30K, amylase 1123, GGT 82. Adm , later wnl, remainder of lipid panel wnl. UA with blood, no infx. EKG sinus jessica. RUQ US unremarkable. CT a/p with acute pancreatitis. Given x1 dose each morphine and zofran and 1L IVF bolus. Admitted to [facility] for treatment of pancreatitis.     B12, TSH, lactate, trop x1, COVID, UCx wnl, vit low 18, a1c 5.9.    INTERVAL HPI/OVERNIGHT EVENTS: NAEON. SBPs soft 100-110s, VS otherwise wnl. Reports feeling a little better, with improving RLQ pain. Interviewed with assistance of Maldivian  Reginald [id number]. S/p x24hrs LR 250cc/hr, will continue x12 hrs 100 cc/hr. Continuing zofran prn nausea and Tylenol, oxycodone, and morphine (none give TD) prn mild, mod, severe pain, respectively. Tolerating CLD. IgG subsets testing. WBC resolved, lipase downtrending. Started daily vit D.     MEDICATIONS  (STANDING):  cholecalciferol 2000 Unit(s) Oral daily  enoxaparin Injectable 40 milliGRAM(s) SubCutaneous daily  lactated ringers. 1000 milliLiter(s) (100 mL/Hr) IV Continuous <Continuous>    MEDICATIONS  (PRN):  acetaminophen   Tablet .. 650 milliGRAM(s) Oral every 6 hours PRN Mild Pain (1 - 3)  morphine  - Injectable 4 milliGRAM(s) IV Push every 6 hours PRN Severe Pain (7 - 10)  ondansetron Injectable 4 milliGRAM(s) IV Push every 6 hours PRN Nausea and/or Vomiting  oxyCODONE    IR 5 milliGRAM(s) Oral every 6 hours PRN Moderate Pain (4 - 6)    REVIEW OF SYSTEMS:  CONSTITUTIONAL: denies fever or fatigue  RESPIRATORY: denies cough, SOB  CARDIOVASCULAR: No chest pain or palpitations  GASTROINTESTINAL: improving RLQ pain, no further n/v/d, good appetite  GENITOURINARY: No dysuria or hematuria  NEUROLOGICAL: denies HA  SKIN: No itching or lesions   all other systems reviewed and are negative     Vital Signs Last 24 Hrs  T(C): 36.8 (28 May 2021 05:35), Max: 36.8 (28 May 2021 05:35)  T(F): 98.3 (28 May 2021 05:35), Max: 98.3 (28 May 2021 05:35)  HR: 60 (28 May 2021 05:35) (55 - 61)  BP: 117/63 (28 May 2021 05:35) (101/56 - 117/65)  BP(mean): --  RR: 16 (28 May 2021 05:35) (16 - 17)  SpO2: 99% (28 May 2021 05:35) (98% - 99%)    PHYSICAL EXAMINATION:  GENERAL: NAD, well built, appears younger than stated age  HEAD: ATNC  EYES: conjunctiva and sclera clear, EOMI, PERRL  NECK: Supple  CHEST/LUNG: good air movement b/l, no increased WOB, RA  HEART: RRR  ABDOMEN: Soft, mildly tender RLQ  NERVOUS SYSTEM: alert and oriented x3  EXTREMITIES:  2+ Peripheral Pulses, No edema  SKIN: warm dry                          11.3   7.31  )-----------( 209      ( 28 May 2021 07:33 )             35.4     05-28    140  |  108  |  9   ----------------------------<  88  3.9   |  31  |  0.52    Ca    8.9      28 May 2021 07:33  Phos  4.0     05-27  Mg     2.1     05-27    TPro  7.2  /  Alb  3.0<L>  /  TBili  0.5  /  DBili  x   /  AST  29  /  ALT  58  /  AlkPhos  106  05-28    LIVER FUNCTIONS - ( 28 May 2021 07:33 )  Alb: 3.0 g/dL / Pro: 7.2 g/dL / ALK PHOS: 106 U/L / ALT: 58 U/L DA / AST: 29 U/L / GGT: x           CARDIAC MARKERS ( 26 May 2021 21:08 )  <0.015 ng/mL / x     / x     / x     / x              CAPILLARY BLOOD GLUCOSE      RADIOLOGY & ADDITIONAL TESTS:                   PGY-1 Progress Note discussed with attending    PAGER #: [[phone]] TILL 5:00 PM  PLEASE CONTACT ON CALL TEAM:  - On Call Team (Please refer to Leonela) FROM 5:00 PM - 8:30PM  - Nightfloat Team FROM 8:30 -7:30 AM    CHIEF COMPLAINT & BRIEF HOSPITAL COURSE: 70M, Prydeinig-speaking, non-drinking, no PMH/PSH or established medical care presented 5/26 with x4 episodes NBNB vomiting in setting of x10 days total and x3 days acutely worsening lower abdominal contraction-like pain, first occuring in the am and later becoming constant. In ED, /55, VS otherwise wnl. Adm WBC 11, , AST//125, lipase 30K, amylase 1123, GGT 82. Adm , later wnl, remainder of lipid panel wnl. UA with blood, no infx. EKG sinus jessica. RUQ US unremarkable. CT a/p with acute pancreatitis. Given x1 dose each morphine and zofran and 1L IVF bolus. Admitted to [facility] for treatment of pancreatitis.     B12, TSH, lactate, trop x1, COVID, UCx wnl, vit low 18, a1c 5.9.    INTERVAL HPI/OVERNIGHT EVENTS: NAEON. SBPs soft 100-110s, VS otherwise wnl. Reports feeling a little better, with improving RLQ pain. Interviewed with assistance of Prydeinig  Reginald [id number]. S/p x24hrs LR 250cc/hr, will continue x12 hrs 100 cc/hr. Continuing zofran prn nausea and Tylenol, dc oxycodone and morphine (none give TD). Tolerating CLD, will trial regular diet. IgG subsets testing. WBC resolved, lipase downtrending. Started weekly vit D.     MEDICATIONS  (STANDING):  cholecalciferol 2000 Unit(s) Oral daily  enoxaparin Injectable 40 milliGRAM(s) SubCutaneous daily  lactated ringers. 1000 milliLiter(s) (100 mL/Hr) IV Continuous <Continuous>    MEDICATIONS  (PRN):  acetaminophen   Tablet .. 650 milliGRAM(s) Oral every 6 hours PRN Mild Pain (1 - 3)  morphine  - Injectable 4 milliGRAM(s) IV Push every 6 hours PRN Severe Pain (7 - 10)  ondansetron Injectable 4 milliGRAM(s) IV Push every 6 hours PRN Nausea and/or Vomiting  oxyCODONE    IR 5 milliGRAM(s) Oral every 6 hours PRN Moderate Pain (4 - 6)    REVIEW OF SYSTEMS:  CONSTITUTIONAL: denies fever or fatigue  RESPIRATORY: denies cough, SOB  CARDIOVASCULAR: No chest pain or palpitations  GASTROINTESTINAL: improving RLQ pain, no further n/v/d, good appetite  GENITOURINARY: No dysuria or hematuria  NEUROLOGICAL: denies HA  SKIN: No itching or lesions   all other systems reviewed and are negative     Vital Signs Last 24 Hrs  T(C): 36.8 (28 May 2021 05:35), Max: 36.8 (28 May 2021 05:35)  T(F): 98.3 (28 May 2021 05:35), Max: 98.3 (28 May 2021 05:35)  HR: 60 (28 May 2021 05:35) (55 - 61)  BP: 117/63 (28 May 2021 05:35) (101/56 - 117/65)  BP(mean): --  RR: 16 (28 May 2021 05:35) (16 - 17)  SpO2: 99% (28 May 2021 05:35) (98% - 99%)    PHYSICAL EXAMINATION:  GENERAL: NAD, well built, appears younger than stated age  HEAD: ATNC  EYES: conjunctiva and sclera clear, EOMI, PERRL  NECK: Supple  CHEST/LUNG: good air movement b/l, no increased WOB, RA  HEART: RRR  ABDOMEN: Soft, mildly tender RLQ  NERVOUS SYSTEM: alert and oriented x3  EXTREMITIES:  2+ Peripheral Pulses, No edema  SKIN: warm dry                          11.3   7.31  )-----------( 209      ( 28 May 2021 07:33 )             35.4     05-28    140  |  108  |  9   ----------------------------<  88  3.9   |  31  |  0.52    Ca    8.9      28 May 2021 07:33  Phos  4.0     05-27  Mg     2.1     05-27    TPro  7.2  /  Alb  3.0<L>  /  TBili  0.5  /  DBili  x   /  AST  29  /  ALT  58  /  AlkPhos  106  05-28    LIVER FUNCTIONS - ( 28 May 2021 07:33 )  Alb: 3.0 g/dL / Pro: 7.2 g/dL / ALK PHOS: 106 U/L / ALT: 58 U/L DA / AST: 29 U/L / GGT: x           CARDIAC MARKERS ( 26 May 2021 21:08 )  <0.015 ng/mL / x     / x     / x     / x              CAPILLARY BLOOD GLUCOSE      RADIOLOGY & ADDITIONAL TESTS:

## 2021-05-28 NOTE — DISCHARGE NOTE NURSING/CASE MANAGEMENT/SOCIAL WORK - PATIENT PORTAL LINK FT
You can access the FollowMyHealth Patient Portal offered by [facility] by registering at the following website: [email or web address]. By joining [email or web address]’s FollowMyHealth portal, you will also be able to view your health information using other applications (apps) compatible with our system.

## 2021-05-28 NOTE — PROGRESS NOTE ADULT - ATTENDING COMMENTS
Patient was seen and examined at bedside   Denies any abd pain, N/V   used [id number]    Vitals noted     P/E:  NAD  AAOx3, no focal deficit   CTABL  S1S2 WNL, No MRG  Abd soft, non tender, BS present   BL LE no edema or tenderness     Labs noted     A/P:  Acute pancreatitis, autoimmune vs idiopathic   Normocytic anemia  Vitamin D deficiency    Plan:  Advance diet   Decrease IVF  Will need outpatient follow up with GI and PCP  Can be discharged today if tolerating diet   Discussed the plan with patient

## 2021-05-28 NOTE — PROGRESS NOTE ADULT - PROBLEM SELECTOR PLAN 1
presented 5/26 with x4 episodes NBNB vomiting in setting of x10 days total and x3 days acutely worsening lower abdominal contraction-like pain, first occuring in the am and later becoming constant  n ED, /55, VS otherwise wnl. Adm WBC 11, , AST//125, lipase 30K, amylase 1123, GGT 82. Adm , later wnl, remainder of lipid panel wnl. UA with blood, no infx. EKG sinus jessica. RUQ US unremarkable. CT a/p with acute pancreatitis. Given x1 dose each morphine and zofran and 1L IVF bolus.  -abd pain, nausea in setting of acute pancreatitis autoimmune vs idiopathic   -US with no evidence of gallstone, does not take any medications at home   -CT abd and US as above   -cont NPO, IVF and pain meds   -if abd pain improves, will advance diet   -cont PRN Zofran presented 5/26 with x4 episodes NBNB vomiting in setting of x10 days total and x3 days acutely worsening lower abdominal contraction-like pain, first occuring in the am and later becoming constant  -in ED, /55, VS otherwise wnl  -adm WBC 11, , AST//125, lipase 30K, amylase 1123, GGT 82  -adm , later wnl, remainder of lipid panel wnl  -adm lactate, trop x1, COVID, UCx wnl  -UA with blood, no infx  -EKG sinus jessica  -RUQ US unremarkable, CT a/p with acute pancreatitis  -given x1 dose each morphine and zofran and 1L IVF bolus in ED  -ddx acute pancreatitis, AI vs idiopathic (no gallstones, TG not very high, no EtOH, no meds, possible ILD suggesting some likelihood of AI)  -LFTs wnl, lipase downtrending 5/28  -s/p x24hrs LR 250cc/hr, will continue x12 hrs 100 cc/hr through 5/28pm  -continuing zofran prn nausea and Tylenol for mild pain   -dc morphine (none give TD) and oxycodone  -tolerating CLD, advance to regular and dc if tolerates presented 5/26 with x4 episodes NBNB vomiting in setting of x10 days total and x3 days acutely worsening lower abdominal contraction-like pain  -in ED, /55, VS otherwise wnl  -adm WBC 11, , AST//125, lipase 30K, amylase 1123, GGT 82  -adm , later wnl, remainder of lipid panel wnl  -adm lactate, trop x1, COVID, UCx wnl  -UA with blood, no infx  -EKG sinus jessica  -RUQ US unremarkable, CT a/p with acute pancreatitis  -given x1 dose each morphine and zofran and 1L IVF bolus in ED  -ddx acute pancreatitis, AI vs idiopathic (no gallstones, TG not very high, no EtOH, no meds, possible ILD suggesting some likelihood of AI)  -LFTs wnl, lipase downtrending 5/28  -s/p x24hrs LR 250cc/hr, will continue x12 hrs 100 cc/hr through 5/28pm  -continuing Zofran prn nausea and Tylenol for mild pain   -dc morphine (none give TD) and oxycodone  -tolerating CLD, advance to regular and dc if tolerates

## 2021-05-28 NOTE — PROGRESS NOTE ADULT - NUTRITIONAL ASSESSMENT
70M, Micronesian-speaking, non-drinking, no PMH/PSH or established medical care . I Admitted to [facility] for treatment of pancreatitis.     B12, TSH, lactate, trop x1, COVID, UCx wnl, vit low 18, a1c 5.9.    INTERVAL HPI/OVERNIGHT EVENTS: NAEON. SBPs soft 100-110s, VS otherwise wnl. Reports feeling a little better, with improving RLQ pain. Interviewed with assistance of Micronesian  Reginald [id number]. S/p x24hrs LR 250cc/hr, will continue x12 hrs 100 cc/hr. Continuing zofran prn nausea and Tylenol, oxycodone, and morphine (none give TD) prn mild, mod, severe pain, respectively. Tolerating CLD. IgG subsets testing. WBC resolved, lipase downtrending. Started daily vit D. INTERVAL HPI/OVERNIGHT EVENTS: NAEON. SBPs soft 100-110s, VS otherwise wnl. Reports feeling a little better, with improving RLQ pain. Interviewed with assistance of Georgian  Reginald [id number]. . .

## 2021-05-28 NOTE — PROGRESS NOTE ADULT - ASSESSMENT
69 yo Telugu speaking M with no PMH and PSH (pt has never seen MD for 23 years since [place] came to [place]) presents with lower abd pain.   Admitted to medicine for acute pancreatitis, started on IVF, NPO and pain mgmt 
70M, Lao-speaking, non-drinking, no PMH/PSH or established medical care presented 5/26 with x4 episodes NBNB vomiting in setting of x10 days total and x3 days acutely worsening lower abdominal contraction-like pain, first occuring in the am and later becoming constant, admitted to [facility] for treatment of pancreatitis.

## 2021-05-28 NOTE — PROGRESS NOTE ADULT - PROBLEM SELECTOR PLAN 3
-AST/ALT improving  -hepatic US unremarkable  -avoid hepatotoxins   -mon CMP -vit d low 18  -B12, TSH wnl  -start vit D repletion 5/28

## 2021-05-28 NOTE — PROGRESS NOTE ADULT - PROBLEM SELECTOR PLAN 2
-AST/ALT improving  -hepatic US unremarkable  -avoid hepatotoxins   -mon CMP
-adm CT a/p with possible ILD and distal transverse colon 6mm linear radiopaque FB  -will rec OP GI fu

## 2021-06-01 PROBLEM — Z00.00 ENCOUNTER FOR PREVENTIVE HEALTH EXAMINATION: Status: ACTIVE | Noted: 2021-06-01

## 2021-06-01 LAB
IGG SERPL-MCNC: 1684 MG/DL — HIGH (ref 603–1613)
IGG1 SER-MCNC: 1053 MG/DL — HIGH (ref 248–810)
IGG2 SER-MCNC: 395 MG/DL — SIGNIFICANT CHANGE UP (ref 130–555)
IGG3 SER-MCNC: 21 MG/DL — SIGNIFICANT CHANGE UP (ref 15–102)
IGG4 SER-MCNC: 76 MG/DL — SIGNIFICANT CHANGE UP (ref 2–96)

## 2021-06-29 ENCOUNTER — APPOINTMENT (OUTPATIENT)
Dept: GASTROENTEROLOGY | Facility: CLINIC | Age: 71
End: 2021-06-29
Payer: MEDICARE

## 2021-06-29 VITALS
HEIGHT: 62 IN | DIASTOLIC BLOOD PRESSURE: 54 MMHG | BODY MASS INDEX: 25.76 KG/M2 | TEMPERATURE: 97.9 F | HEART RATE: 64 BPM | SYSTOLIC BLOOD PRESSURE: 99 MMHG | OXYGEN SATURATION: 98 % | WEIGHT: 140 LBS

## 2021-06-29 PROCEDURE — 99203 OFFICE O/P NEW LOW 30 MIN: CPT

## 2021-07-02 ENCOUNTER — INPATIENT (INPATIENT)
Facility: HOSPITAL | Age: 71
LOS: 4 days | Discharge: HOME | DRG: 417 | End: 2021-07-07
Attending: SURGERY | Admitting: SURGERY
Payer: COMMERCIAL

## 2021-07-02 ENCOUNTER — APPOINTMENT (OUTPATIENT)
Dept: INTERNAL MEDICINE | Facility: CLINIC | Age: 71
End: 2021-07-02

## 2021-07-02 VITALS
HEIGHT: 64.17 IN | SYSTOLIC BLOOD PRESSURE: 118 MMHG | OXYGEN SATURATION: 100 % | HEART RATE: 57 BPM | DIASTOLIC BLOOD PRESSURE: 56 MMHG | RESPIRATION RATE: 16 BRPM | TEMPERATURE: 98 F

## 2021-07-02 DIAGNOSIS — Z78.9 OTHER SPECIFIED HEALTH STATUS: Chronic | ICD-10-CM

## 2021-07-02 DIAGNOSIS — R10.9 UNSPECIFIED ABDOMINAL PAIN: ICD-10-CM

## 2021-07-02 DIAGNOSIS — K85.90 ACUTE PANCREATITIS WITHOUT NECROSIS OR INFECTION, UNSPECIFIED: ICD-10-CM

## 2021-07-02 DIAGNOSIS — R74.01 ELEVATION OF LEVELS OF LIVER TRANSAMINASE LEVELS: ICD-10-CM

## 2021-07-02 DIAGNOSIS — Z29.9 ENCOUNTER FOR PROPHYLACTIC MEASURES, UNSPECIFIED: ICD-10-CM

## 2021-07-02 LAB
ALBUMIN SERPL ELPH-MCNC: 3.5 G/DL — SIGNIFICANT CHANGE UP (ref 3.5–5)
ALP SERPL-CCNC: 171 U/L — HIGH (ref 40–120)
ALT FLD-CCNC: 246 U/L DA — HIGH (ref 10–60)
ANION GAP SERPL CALC-SCNC: 9 MMOL/L — SIGNIFICANT CHANGE UP (ref 5–17)
APPEARANCE UR: CLEAR — SIGNIFICANT CHANGE UP
AST SERPL-CCNC: 367 U/L — HIGH (ref 10–40)
BACTERIA # UR AUTO: NEGATIVE /HPF — SIGNIFICANT CHANGE UP
BASOPHILS # BLD AUTO: 0.02 K/UL — SIGNIFICANT CHANGE UP (ref 0–0.2)
BASOPHILS NFR BLD AUTO: 0.2 % — SIGNIFICANT CHANGE UP (ref 0–2)
BILIRUB SERPL-MCNC: 0.8 MG/DL — SIGNIFICANT CHANGE UP (ref 0.2–1.2)
BILIRUB UR-MCNC: NEGATIVE — SIGNIFICANT CHANGE UP
BUN SERPL-MCNC: 18 MG/DL — SIGNIFICANT CHANGE UP (ref 7–18)
CALCIUM SERPL-MCNC: 8.8 MG/DL — SIGNIFICANT CHANGE UP (ref 8.4–10.5)
CHLORIDE SERPL-SCNC: 102 MMOL/L — SIGNIFICANT CHANGE UP (ref 96–108)
CO2 SERPL-SCNC: 28 MMOL/L — SIGNIFICANT CHANGE UP (ref 22–31)
COLOR SPEC: YELLOW — SIGNIFICANT CHANGE UP
CREAT SERPL-MCNC: 0.78 MG/DL — SIGNIFICANT CHANGE UP (ref 0.5–1.3)
DIFF PNL FLD: ABNORMAL
EOSINOPHIL # BLD AUTO: 0.01 K/UL — SIGNIFICANT CHANGE UP (ref 0–0.5)
EOSINOPHIL NFR BLD AUTO: 0.1 % — SIGNIFICANT CHANGE UP (ref 0–6)
EPI CELLS # UR: NEGATIVE /HPF — SIGNIFICANT CHANGE UP
GLUCOSE SERPL-MCNC: 147 MG/DL — HIGH (ref 70–99)
GLUCOSE UR QL: NEGATIVE — SIGNIFICANT CHANGE UP
HCT VFR BLD CALC: 40 % — SIGNIFICANT CHANGE UP (ref 39–50)
HGB BLD-MCNC: 12.9 G/DL — LOW (ref 13–17)
IMM GRANULOCYTES NFR BLD AUTO: 0.4 % — SIGNIFICANT CHANGE UP (ref 0–1.5)
KETONES UR-MCNC: NEGATIVE — SIGNIFICANT CHANGE UP
LACTATE SERPL-SCNC: 2 MMOL/L — SIGNIFICANT CHANGE UP (ref 0.7–2)
LEUKOCYTE ESTERASE UR-ACNC: NEGATIVE — SIGNIFICANT CHANGE UP
LIDOCAIN IGE QN: HIGH U/L (ref 73–393)
LYMPHOCYTES # BLD AUTO: 0.88 K/UL — LOW (ref 1–3.3)
LYMPHOCYTES # BLD AUTO: 8.1 % — LOW (ref 13–44)
MCHC RBC-ENTMCNC: 28.7 PG — SIGNIFICANT CHANGE UP (ref 27–34)
MCHC RBC-ENTMCNC: 32.3 GM/DL — SIGNIFICANT CHANGE UP (ref 32–36)
MCV RBC AUTO: 88.9 FL — SIGNIFICANT CHANGE UP (ref 80–100)
MONOCYTES # BLD AUTO: 0.72 K/UL — SIGNIFICANT CHANGE UP (ref 0–0.9)
MONOCYTES NFR BLD AUTO: 6.6 % — SIGNIFICANT CHANGE UP (ref 2–14)
NEUTROPHILS # BLD AUTO: 9.19 K/UL — HIGH (ref 1.8–7.4)
NEUTROPHILS NFR BLD AUTO: 84.6 % — HIGH (ref 43–77)
NITRITE UR-MCNC: NEGATIVE — SIGNIFICANT CHANGE UP
NRBC # BLD: 0 /100 WBCS — SIGNIFICANT CHANGE UP (ref 0–0)
PH UR: 7 — SIGNIFICANT CHANGE UP (ref 5–8)
PLATELET # BLD AUTO: 223 K/UL — SIGNIFICANT CHANGE UP (ref 150–400)
POTASSIUM SERPL-MCNC: 4.4 MMOL/L — SIGNIFICANT CHANGE UP (ref 3.5–5.3)
POTASSIUM SERPL-SCNC: 4.4 MMOL/L — SIGNIFICANT CHANGE UP (ref 3.5–5.3)
PROT SERPL-MCNC: 8.4 G/DL — HIGH (ref 6–8.3)
PROT UR-MCNC: NEGATIVE — SIGNIFICANT CHANGE UP
RBC # BLD: 4.5 M/UL — SIGNIFICANT CHANGE UP (ref 4.2–5.8)
RBC # FLD: 13.5 % — SIGNIFICANT CHANGE UP (ref 10.3–14.5)
RBC CASTS # UR COMP ASSIST: SIGNIFICANT CHANGE UP /HPF (ref 0–2)
SARS-COV-2 RNA SPEC QL NAA+PROBE: SIGNIFICANT CHANGE UP
SODIUM SERPL-SCNC: 139 MMOL/L — SIGNIFICANT CHANGE UP (ref 135–145)
SP GR SPEC: 1.01 — SIGNIFICANT CHANGE UP (ref 1.01–1.02)
TRIGL SERPL-MCNC: 83 MG/DL — SIGNIFICANT CHANGE UP
UROBILINOGEN FLD QL: 4
WBC # BLD: 10.86 K/UL — HIGH (ref 3.8–10.5)
WBC # FLD AUTO: 10.86 K/UL — HIGH (ref 3.8–10.5)
WBC UR QL: SIGNIFICANT CHANGE UP /HPF (ref 0–5)

## 2021-07-02 PROCEDURE — 99285 EMERGENCY DEPT VISIT HI MDM: CPT

## 2021-07-02 PROCEDURE — 99223 1ST HOSP IP/OBS HIGH 75: CPT | Mod: GC

## 2021-07-02 PROCEDURE — 74177 CT ABD & PELVIS W/CONTRAST: CPT | Mod: 26

## 2021-07-02 RX ORDER — HYDROMORPHONE HYDROCHLORIDE 2 MG/ML
1 INJECTION INTRAMUSCULAR; INTRAVENOUS; SUBCUTANEOUS EVERY 6 HOURS
Refills: 0 | Status: DISCONTINUED | OUTPATIENT
Start: 2021-07-02 | End: 2021-07-02

## 2021-07-02 RX ORDER — KETOROLAC TROMETHAMINE 30 MG/ML
30 SYRINGE (ML) INJECTION EVERY 6 HOURS
Refills: 0 | Status: DISCONTINUED | OUTPATIENT
Start: 2021-07-02 | End: 2021-07-07

## 2021-07-02 RX ORDER — MORPHINE SULFATE 50 MG/1
4 CAPSULE, EXTENDED RELEASE ORAL ONCE
Refills: 0 | Status: DISCONTINUED | OUTPATIENT
Start: 2021-07-02 | End: 2021-07-02

## 2021-07-02 RX ORDER — SODIUM CHLORIDE 9 MG/ML
1000 INJECTION INTRAMUSCULAR; INTRAVENOUS; SUBCUTANEOUS ONCE
Refills: 0 | Status: COMPLETED | OUTPATIENT
Start: 2021-07-02 | End: 2021-07-02

## 2021-07-02 RX ORDER — HYDROMORPHONE HYDROCHLORIDE 2 MG/ML
0.5 INJECTION INTRAMUSCULAR; INTRAVENOUS; SUBCUTANEOUS EVERY 6 HOURS
Refills: 0 | Status: DISCONTINUED | OUTPATIENT
Start: 2021-07-02 | End: 2021-07-02

## 2021-07-02 RX ORDER — HYDROMORPHONE HYDROCHLORIDE 2 MG/ML
0.5 INJECTION INTRAMUSCULAR; INTRAVENOUS; SUBCUTANEOUS EVERY 6 HOURS
Refills: 0 | Status: DISCONTINUED | OUTPATIENT
Start: 2021-07-02 | End: 2021-07-07

## 2021-07-02 RX ORDER — SODIUM CHLORIDE 9 MG/ML
1000 INJECTION, SOLUTION INTRAVENOUS
Refills: 0 | Status: DISCONTINUED | OUTPATIENT
Start: 2021-07-02 | End: 2021-07-03

## 2021-07-02 RX ORDER — ENOXAPARIN SODIUM 100 MG/ML
40 INJECTION SUBCUTANEOUS DAILY
Refills: 0 | Status: DISCONTINUED | OUTPATIENT
Start: 2021-07-02 | End: 2021-07-05

## 2021-07-02 RX ORDER — PANTOPRAZOLE SODIUM 20 MG/1
40 TABLET, DELAYED RELEASE ORAL DAILY
Refills: 0 | Status: DISCONTINUED | OUTPATIENT
Start: 2021-07-02 | End: 2021-07-07

## 2021-07-02 RX ADMIN — SODIUM CHLORIDE 1000 MILLILITER(S): 9 INJECTION INTRAMUSCULAR; INTRAVENOUS; SUBCUTANEOUS at 13:23

## 2021-07-02 RX ADMIN — Medication 30 MILLIGRAM(S): at 19:21

## 2021-07-02 RX ADMIN — SODIUM CHLORIDE 250 MILLILITER(S): 9 INJECTION, SOLUTION INTRAVENOUS at 15:24

## 2021-07-02 RX ADMIN — Medication 30 MILLIGRAM(S): at 18:50

## 2021-07-02 RX ADMIN — SODIUM CHLORIDE 1000 MILLILITER(S): 9 INJECTION INTRAMUSCULAR; INTRAVENOUS; SUBCUTANEOUS at 12:07

## 2021-07-02 RX ADMIN — SODIUM CHLORIDE 250 MILLILITER(S): 9 INJECTION, SOLUTION INTRAVENOUS at 22:17

## 2021-07-02 RX ADMIN — MORPHINE SULFATE 4 MILLIGRAM(S): 50 CAPSULE, EXTENDED RELEASE ORAL at 13:22

## 2021-07-02 RX ADMIN — MORPHINE SULFATE 4 MILLIGRAM(S): 50 CAPSULE, EXTENDED RELEASE ORAL at 12:07

## 2021-07-02 RX ADMIN — SODIUM CHLORIDE 1000 MILLILITER(S): 9 INJECTION INTRAMUSCULAR; INTRAVENOUS; SUBCUTANEOUS at 15:00

## 2021-07-02 RX ADMIN — SODIUM CHLORIDE 1000 MILLILITER(S): 9 INJECTION INTRAMUSCULAR; INTRAVENOUS; SUBCUTANEOUS at 13:22

## 2021-07-02 NOTE — H&P ADULT - NSHPPHYSICALEXAM_GEN_ALL_CORE
LOS:     VITALS:   T(C): 36.6 (07-02-21 @ 12:50), Max: 36.8 (07-02-21 @ 09:48)  HR: 60 (07-02-21 @ 12:50) (55 - 60)  BP: 123/62 (07-02-21 @ 12:50) (118/56 - 123/62)  RR: 18 (07-02-21 @ 12:50) (16 - 18)  SpO2: 99% (07-02-21 @ 12:50) (99% - 100%)    GENERAL: pt complaining of abdominal pain   HEAD:  Atraumatic, Normocephalic  EYES: EOMI, PERRLA, conjunctiva and sclera clear  ENT: Moist mucous membranes  NECK: Supple, No JVD  CHEST/LUNG: Clear to auscultation bilaterally; No rales, rhonchi, wheezing, or rubs. Unlabored respirations  HEART: Regular rate and rhythm; No murmurs, rubs, or gallops  ABDOMEN: sluggish BSx4; Soft, right lower quadrant and epigastric tenderness, nondistended  EXTREMITIES:  2+ Peripheral Pulses, brisk capillary refill. No clubbing, cyanosis, or edema  NERVOUS SYSTEM:  A&Ox3, no focal deficits   SKIN: No rashes or lesions LOS:     VITALS:   T(C): 36.6 (07-02-21 @ 12:50), Max: 36.8 (07-02-21 @ 09:48)  HR: 60 (07-02-21 @ 12:50) (55 - 60)  BP: 123/62 (07-02-21 @ 12:50) (118/56 - 123/62)  RR: 18 (07-02-21 @ 12:50) (16 - 18)  SpO2: 99% (07-02-21 @ 12:50) (99% - 100%)    GENERAL: pt complaining of abdominal pain   HEAD:  Atraumatic, Normocephalic  EYES: EOMI, conjunctiva and sclera clear  ENT: Moist mucous membranes  NECK: Supple, No JVD  CHEST/LUNG: Clear to auscultation bilaterally; No rales, rhonchi, wheezing, or rubs. Unlabored respirations  HEART: Regular rate and rhythm; No murmurs, rubs, or gallops  ABDOMEN: sluggish BSx4; Soft, right lower quadrant and epigastric tenderness, nondistended  EXTREMITIES:  2+ Peripheral Pulses, brisk capillary refill. No clubbing, cyanosis, or edema  NERVOUS SYSTEM:  A&Ox3, no focal deficits   SKIN: No rashes or lesions

## 2021-07-02 NOTE — ED PROVIDER NOTE - PROGRESS NOTE DETAILS
lipse >30k c/w acute pancreatitis  patient denies alcohol consumption  unclear etiology at this time of pacreatitis, otherwise HDS, no f/c, no RUQ pain, CT obtained recently, therefore will hold on CT at this time and refer to inptient team as patient might require mrcp

## 2021-07-02 NOTE — H&P ADULT - ATTENDING COMMENTS
NUNU WALLS    Patient Age: 59 year old   Refill request by: Phone.  Caller informed to check with the pharmacy later for their refill.  If problems arise, we will contact patient.  Refill to be: Faxed to [facility]    Medication requested to be refilled:   sitaGLIPtin (JANUVIA) 100 MG tablet  atorvastatin (LIPITOR) 10 MG tablet    PT TOTALLY OUT OF THE MEDICATION     WEIGHT AND HEIGHT:   Wt Readings from Last 1 Encounters:   12/10/19 75.8 kg (167 lb)     Ht Readings from Last 1 Encounters:   12/10/19 5' 8\" (1.727 m)     BMI Readings from Last 1 Encounters:   12/10/19 25.39 kg/m²       ALLERGIES: no known allergies.  Current Outpatient Medications   Medication   • guaiFENesin (MUCINEX) 600 MG 12 hr tablet   • amoxicillin-clavulanate (AUGMENTIN) 875-125 MG per tablet   • atorvastatin (LIPITOR) 10 MG tablet   • ACCU-CHEK FASTCLIX LANCETS Misc   • sitaGLIPtin (JANUVIA) 100 MG tablet   • metformin (GLUCOPHAGE) 1000 MG tablet   • Lancets (MICROLET) Misc   • Blood Glucose Monitoring Suppl (ACCU-CHEK LEONIDAS PLUS) w/Device Kit   • blood glucose (ACCU-CHEK LEOINDAS) test strip   • Blood Glucose Monitoring Suppl (BLOOD GLUCOSE MONITOR SYSTEM) w/Device Kit     No current facility-administered medications for this visit.      PHARMACY to use: [facility]          Pharmacy preference(s) on file:   [id number] - [place], [place] - [address]  [address] [address]  Phone: [phone] Fax: [phone]    [facility] - [place] - [address]  [address] [place] [address]  Phone: [phone] Fax: [phone]      CALL BACK INFO: Ok to leave response (including medical information) with family member or on answering machine  ROUTING: Patient's physician/staff        PCP: Ariadne Mott DO         INS: Payor: JOSE LUIS/JACQUELINE / Plan: [id number] / Product Type: PPO MISC   PATIENT ADDRESS:  [address] [place] [address]   [facility] [id number]    
Ok to refill per protocol    
71y/o M with PMH of pancreatitis presents to the Ed with the complaint of severe abdominal pain, sudden onset, burning starting at 3:30 am. Pain started as a sharp burning kind of pain in the right upper quadrant and then moved to the epigastric area. He described it as 9/10 in intensity not associated with nausea or vomiting. Pt states he had spicy food the night before. Denies new medications, new supplements. Pt notes the epigastric pain is different from the prior admission when he was admitted for pancreatitis. Pt otherwise denies chest pain, shortness of breath, nausea; notes one episode of yellowish vomitus, denies diarrhea, all other ROS negative. Pt notes chronic constipation.    PMH - pancreatitis  PSH - none  Meds - Vitamin D 50,000 units weekly  Allergies - NKDA  SHx - never smoker, denies alcohol, illicit drug use      Vital Signs Last 24 Hrs  T(C): 36.6 (2021 16:10), Max: 36.8 (2021 09:48)  T(F): 97.8 (2021 16:10), Max: 98.2 (2021 09:48)  HR: 52 (2021 16:10) (52 - 60)  BP: 127/62 (2021 16:10) (118/56 - 127/62)  RR: 18 (2021 16:10) (16 - 18)  SpO2: 99% (2021 16:10) (99% - 100%)    Significant PE: above as per MAR with addition of RUQ and epigastric tenderness on abdominal exam    LABS:      CBC Full  -  ( 2021 10:34 )  WBC Count : 10.86 K/uL  RBC Count : 4.50 M/uL  Hemoglobin : 12.9 g/dL  Hematocrit : 40.0 %  Platelet Count - Automated : 223 K/uL  Mean Cell Volume : 88.9 fl  Mean Cell Hemoglobin : 28.7 pg  Mean Cell Hemoglobin Concentration : 32.3 gm/dL  Auto Neutrophil # : 9.19 K/uL  Auto Lymphocyte # : 0.88 K/uL  Auto Monocyte # : 0.72 K/uL  Auto Eosinophil # : 0.01 K/uL  Auto Basophil # : 0.02 K/uL  Auto Neutrophil % : 84.6 %  Auto Lymphocyte % : 8.1 %  Auto Monocyte % : 6.6 %  Auto Eosinophil % : 0.1 %  Auto Basophil % : 0.2 %        139  |  102  |  18  ----------------------------<  147<H>  4.4   |  28  |  0.78    Ca    8.8      2021 10:34    TPro  8.4<H>  /  Alb  3.5  /  TBili  0.8  /  DBili  x   /  AST  367<H>  /  ALT  246<H>  /  AlkPhos  171<H>        Urinalysis Basic - ( 2021 11:28 )    Color: Yellow / Appearance: Clear / S.010 / pH: x  Gluc: x / Ketone: Negative  / Bili: Negative / Urobili: 4   Blood: x / Protein: Negative / Nitrite: Negative   Leuk Esterase: Negative / RBC: 0-2 /HPF / WBC 0-2 /HPF   Sq Epi: x / Non Sq Epi: Negative /HPF / Bacteria: Negative /HPF  < from: CT Abdomen and Pelvis w/ IV Cont (21 @ 17:39) >        RADIOLOGY & ADDITIONAL STUDIES (The following images were personally reviewed):  IMPRESSION:  Mild peripancreatic stranding again noted, suggestive of recurrence of acute pancreatitis. No evidence for pancreatic necrosis. Clinical correlation with pancreatic enzymes is recommended.    Mildly dilated common bile duct. MRCP may be pursued for further evaluation.    < end of copied text >      A/P  Recurrent pancreatitis, possible gallstone pancreatitis  Abdominal Pain  Transaminitis      LR with high rate IV fluid hydration  Pain control  Ct abdomen/pelvis imaging noted above, suspect gallstones given dilated common bile duct  advance diet as tolerated  NPO after midnight for RUQ US  GI consulted, patient may need MRCP/ERCP for further investigation  Hepatitis panel  Continue to monitor LFTs and for clinical improvement

## 2021-07-02 NOTE — ED PROVIDER NOTE - PHYSICAL EXAMINATION
Sohrawardy:   VS reviewed  NAD, nontoxic appearing, but uncomfortable, laying in stretcher  aaox3  rrr  normal resp effort  +ttp epigastric region, no guarding, no rebound  no cva tenderness  skin normal, no jaundice, no icterus

## 2021-07-02 NOTE — H&P ADULT - ASSESSMENT
69y/o M with PMH of pancreatitis presents to the Ed with the complaint of severe abdominal pain. Admitted for pancreatitis

## 2021-07-02 NOTE — H&P ADULT - NSICDXFAMILYHX_GEN_ALL_CORE_FT
FAMILY HISTORY:  Father  Still living? Unknown  FH: asthma, Age at diagnosis: Age Unknown  FH: diabetes mellitus, Age at diagnosis: Age Unknown    Mother  Still living? Unknown  FH: arthritis, Age at diagnosis: Age Unknown

## 2021-07-02 NOTE — H&P ADULT - HISTORY OF PRESENT ILLNESS
69y/o M with PMH of pancreatitis presents to the Ed with the complaint of severe abdominal pain. Pt states his pain started at 3:30am this morning. It started as a sharp burning kind of pain in the right lower quadrant and then moved to the epigastric area. He described it as 9/10 in intensity not associated with nausea or vomiting. Pt states he had spicy food the night before.  Pt had similar pain 1 month ago, the pain then was confined to the right lower quadrant and pt was admitted for pancreatitis. Pt states he has been constipated for the past couple of weeks, he has to strain to pass stool. He has a bowel movement every 2 days and his last BM was this AM. Pt is passing gas. Pt states he occasionally gets a bitter taste in his moth when sleeping. Denies alcohol use, diarrhea, chest pain, palpitations, burning micturition, headaches or any other complaints. Pt has never had na endoscopy or colonoscopy and does not have a PCP. Pt states he was going to take a stool sample to the Gastroenterologist this am but came to the ED instead due to abdominal pain. No hx of gallstones.

## 2021-07-02 NOTE — H&P ADULT - PROBLEM SELECTOR PLAN 1
Pancreatitis vs Appendicitis vs GERD  Rt lower quadrant and epigastric pain  Vitals stable  s/p 2L bolus and morphine in ED  Lipase: >30,000  calcium 8.8, Follow ionized Ca  Follow TG  Follow CT abd  Started on LR @250ml/hour  Pain control with dilaudid and Tramadol  Will avoid morphine as it can increase sphincter of oddi pressure  NPO except meds  Advance diet as soon as possible as tolerated. Pancreatitis vs Appendicitis vs GERD  Rt lower quadrant and epigastric pain  Vitals stable  s/p 2L bolus and morphine in ED  Lipase: >30,000  calcium 8.8, Follow ionized Ca  Follow TG  Follow CT abd  Started on LR @250ml/hour  Pain control with dilaudid   Will avoid morphine as it can increase sphincter of oddi pressure  Will start on protonix   NPO except meds  Advance diet as soon as possible as tolerated. Pancreatitis vs Appendicitis vs GERD  Rt lower quadrant and epigastric pain  Vitals stable  s/p 2L bolus and morphine in ED  Lipase: >30,000  calcium 8.8, Follow ionized Ca  Follow TG  Follow CT abd  Started on LR @250ml/hour  Pain control with dilaudid   Will avoid morphine as it can increase sphincter of oddi pressure  Will start on protonix   NPO except meds  Advance diet as soon as possible as tolerated.  Dr. De La Torre consulted

## 2021-07-02 NOTE — H&P ADULT - PROBLEM SELECTOR PLAN 4
IMPROVE VTE Individual Risk Assessment  RISK                                                                Points  [  ] Previous VTE                                                  3  [  ] Thrombophilia                                               2  [  ] Lower limb paralysis                                      2        (unable to hold up >15 seconds)    [  ] Current Cancer                                              2         (within 6 months)  [x  ] Immobilization > 24 hrs                                1  [  ] ICU/CCU stay > 24 hours                              1  [x  ] Age > 60                                                      1  IMPROVE VTE Score _________2, -- for DVT proph  Lovenox 40mg

## 2021-07-02 NOTE — ED PROVIDER NOTE - OBJECTIVE STATEMENT
71yo M pmhx pancreatitis, former alcoholic, stopped 5 yrs ago p/w epigastric abd pain. Patient states that he woke this AM with generalized abd pain, R-sided mainly, that radiated to epigastric region. currently endorsing epigastric region pain only, aching, deep, intermittently sharp. patient states that this pain is similar to previous pancreatitis. patient denies alcohol consumption. no change in meds. Last BM brown, nonbloody yesterday. Denies f/c, cp, sob, back pain, cough, urinary sxs otherwise.

## 2021-07-02 NOTE — ED PROVIDER NOTE - NS ED ROS FT
(+) epigastric abd pain, nausea, but no emesis  (-) fevers, chills  (-) dizziness, lightheadedness, vision changes  (-) neck pain, stiffness  (-) cp, palpitations  (-) sob, cough, hemoptysis  (-) urinary sxs, back pain  (-) weakness, paresthesias

## 2021-07-02 NOTE — ED PROVIDER NOTE - CLINICAL SUMMARY MEDICAL DECISION MAKING FREE TEXT BOX
pt p/w epigastric abd pain  h/o acute pancreatitis  concerned for pancreatitis vs pud vs biliary pathology  no jaundice, icterus at this time, afebrile, unlikely ascending cholangitis, hepatitis  labs, IVF, pain control, reassess

## 2021-07-03 DIAGNOSIS — K76.0 FATTY (CHANGE OF) LIVER, NOT ELSEWHERE CLASSIFIED: ICD-10-CM

## 2021-07-03 DIAGNOSIS — K85.10 BILIARY ACUTE PANCREATITIS WITHOUT NECROSIS OR INFECTION: ICD-10-CM

## 2021-07-03 DIAGNOSIS — K80.00 CALCULUS OF GALLBLADDER WITH ACUTE CHOLECYSTITIS WITHOUT OBSTRUCTION: ICD-10-CM

## 2021-07-03 LAB
ALBUMIN SERPL ELPH-MCNC: 2.8 G/DL — LOW (ref 3.5–5)
ALP SERPL-CCNC: 135 U/L — HIGH (ref 40–120)
ALT FLD-CCNC: 154 U/L DA — HIGH (ref 10–60)
ANION GAP SERPL CALC-SCNC: 9 MMOL/L — SIGNIFICANT CHANGE UP (ref 5–17)
AST SERPL-CCNC: 99 U/L — HIGH (ref 10–40)
BILIRUB SERPL-MCNC: 0.7 MG/DL — SIGNIFICANT CHANGE UP (ref 0.2–1.2)
BUN SERPL-MCNC: 13 MG/DL — SIGNIFICANT CHANGE UP (ref 7–18)
CA-I BLD-SCNC: 1.14 MMOL/L — SIGNIFICANT CHANGE UP (ref 1.12–1.3)
CALCIUM SERPL-MCNC: 8.5 MG/DL — SIGNIFICANT CHANGE UP (ref 8.4–10.5)
CHLORIDE SERPL-SCNC: 108 MMOL/L — SIGNIFICANT CHANGE UP (ref 96–108)
CHOLEST SERPL-MCNC: 146 MG/DL — SIGNIFICANT CHANGE UP
CO2 SERPL-SCNC: 26 MMOL/L — SIGNIFICANT CHANGE UP (ref 22–31)
COVID-19 SPIKE DOMAIN AB INTERP: POSITIVE
COVID-19 SPIKE DOMAIN ANTIBODY RESULT: >250 U/ML — HIGH
CREAT SERPL-MCNC: 0.56 MG/DL — SIGNIFICANT CHANGE UP (ref 0.5–1.3)
GGT SERPL-CCNC: 112 U/L — HIGH (ref 9–50)
GLUCOSE BLDC GLUCOMTR-MCNC: 82 MG/DL — SIGNIFICANT CHANGE UP (ref 70–99)
GLUCOSE BLDC GLUCOMTR-MCNC: 88 MG/DL — SIGNIFICANT CHANGE UP (ref 70–99)
GLUCOSE BLDC GLUCOMTR-MCNC: 93 MG/DL — SIGNIFICANT CHANGE UP (ref 70–99)
GLUCOSE SERPL-MCNC: 88 MG/DL — SIGNIFICANT CHANGE UP (ref 70–99)
HAV IGM SER-ACNC: SIGNIFICANT CHANGE UP
HBV CORE IGM SER-ACNC: SIGNIFICANT CHANGE UP
HBV SURFACE AG SER-ACNC: SIGNIFICANT CHANGE UP
HCT VFR BLD CALC: 32.1 % — LOW (ref 39–50)
HCV AB S/CO SERPL IA: 0.34 S/CO — SIGNIFICANT CHANGE UP (ref 0–0.99)
HCV AB SERPL-IMP: SIGNIFICANT CHANGE UP
HDLC SERPL-MCNC: 52 MG/DL — SIGNIFICANT CHANGE UP
HGB BLD-MCNC: 10.2 G/DL — LOW (ref 13–17)
LIDOCAIN IGE QN: 2752 U/L — HIGH (ref 73–393)
LIPID PNL WITH DIRECT LDL SERPL: 75 MG/DL — SIGNIFICANT CHANGE UP
MAGNESIUM SERPL-MCNC: 2.1 MG/DL — SIGNIFICANT CHANGE UP (ref 1.6–2.6)
MCHC RBC-ENTMCNC: 28.5 PG — SIGNIFICANT CHANGE UP (ref 27–34)
MCHC RBC-ENTMCNC: 31.8 GM/DL — LOW (ref 32–36)
MCV RBC AUTO: 89.7 FL — SIGNIFICANT CHANGE UP (ref 80–100)
NON HDL CHOLESTEROL: 94 MG/DL — SIGNIFICANT CHANGE UP
NRBC # BLD: 0 /100 WBCS — SIGNIFICANT CHANGE UP (ref 0–0)
PHOSPHATE SERPL-MCNC: 2.9 MG/DL — SIGNIFICANT CHANGE UP (ref 2.5–4.5)
PLATELET # BLD AUTO: 185 K/UL — SIGNIFICANT CHANGE UP (ref 150–400)
POTASSIUM SERPL-MCNC: 3.7 MMOL/L — SIGNIFICANT CHANGE UP (ref 3.5–5.3)
POTASSIUM SERPL-SCNC: 3.7 MMOL/L — SIGNIFICANT CHANGE UP (ref 3.5–5.3)
PROT SERPL-MCNC: 6.7 G/DL — SIGNIFICANT CHANGE UP (ref 6–8.3)
RBC # BLD: 3.58 M/UL — LOW (ref 4.2–5.8)
RBC # FLD: 13.8 % — SIGNIFICANT CHANGE UP (ref 10.3–14.5)
SARS-COV-2 IGG+IGM SERPL QL IA: >250 U/ML — HIGH
SARS-COV-2 IGG+IGM SERPL QL IA: POSITIVE
SODIUM SERPL-SCNC: 143 MMOL/L — SIGNIFICANT CHANGE UP (ref 135–145)
TRIGL SERPL-MCNC: 94 MG/DL — SIGNIFICANT CHANGE UP
WBC # BLD: 7.06 K/UL — SIGNIFICANT CHANGE UP (ref 3.8–10.5)
WBC # FLD AUTO: 7.06 K/UL — SIGNIFICANT CHANGE UP (ref 3.8–10.5)

## 2021-07-03 PROCEDURE — 99233 SBSQ HOSP IP/OBS HIGH 50: CPT

## 2021-07-03 PROCEDURE — 76705 ECHO EXAM OF ABDOMEN: CPT | Mod: 26

## 2021-07-03 RX ORDER — METRONIDAZOLE 500 MG
500 TABLET ORAL EVERY 8 HOURS
Refills: 0 | Status: DISCONTINUED | OUTPATIENT
Start: 2021-07-03 | End: 2021-07-04

## 2021-07-03 RX ORDER — SODIUM CHLORIDE 9 MG/ML
1000 INJECTION, SOLUTION INTRAVENOUS
Refills: 0 | Status: DISCONTINUED | OUTPATIENT
Start: 2021-07-03 | End: 2021-07-05

## 2021-07-03 RX ORDER — CEFTRIAXONE 500 MG/1
1000 INJECTION, POWDER, FOR SOLUTION INTRAMUSCULAR; INTRAVENOUS EVERY 24 HOURS
Refills: 0 | Status: DISCONTINUED | OUTPATIENT
Start: 2021-07-03 | End: 2021-07-03

## 2021-07-03 RX ORDER — CIPROFLOXACIN LACTATE 400MG/40ML
400 VIAL (ML) INTRAVENOUS EVERY 12 HOURS
Refills: 0 | Status: DISCONTINUED | OUTPATIENT
Start: 2021-07-03 | End: 2021-07-04

## 2021-07-03 RX ADMIN — Medication 200 MILLIGRAM(S): at 17:22

## 2021-07-03 RX ADMIN — Medication 100 MILLIGRAM(S): at 14:53

## 2021-07-03 RX ADMIN — SODIUM CHLORIDE 125 MILLILITER(S): 9 INJECTION, SOLUTION INTRAVENOUS at 14:56

## 2021-07-03 RX ADMIN — SODIUM CHLORIDE 250 MILLILITER(S): 9 INJECTION, SOLUTION INTRAVENOUS at 05:15

## 2021-07-03 RX ADMIN — ENOXAPARIN SODIUM 40 MILLIGRAM(S): 100 INJECTION SUBCUTANEOUS at 11:46

## 2021-07-03 RX ADMIN — Medication 100 MILLIGRAM(S): at 21:38

## 2021-07-03 RX ADMIN — PANTOPRAZOLE SODIUM 40 MILLIGRAM(S): 20 TABLET, DELAYED RELEASE ORAL at 11:47

## 2021-07-03 NOTE — CONSULT NOTE ADULT - SUBJECTIVE AND OBJECTIVE BOX
GENERAL SURGERY CONSULTATION NOTE  Patient is a 70y old  Male who presents with a chief complaint of abdominal pain (2021 12:51)    HPI: 70M with PMHx of pancreatitis presented to the ED c/o 9/10 severe sharp epigastric abdominal pain radiating to the back that started at 3:30am morning of , associated with nausea and vomiting after eating. Pt had a similar episode 1 month ago and was admitted to the hospital, found to have pancreatitis 21. Pt states he has been constipated for the past couple of weeks, he has to strain to pass stool. He has a bowel movement every 2 days and his last BM was this AM. Pt endorses an occasionally bitter taste in his mouth when sleeping. Denies alcohol use, diarrhea, chest pain, palpitations, dysuria, headaches or any other complaints. Pt has never had an endoscopy or colonoscopy and does not have a PCP. Denies surgical or medical history. Pt currently denies abdominal pain, nausea or vomiting.    Discussed with patient extensively the current diagnosis and the recommendation for cholecystectomy; patient and wife and sister at bedside would like to think about it. Pt requesting medication to dissolve the stones in the gallbladder, or a procedure to break up the stones with laser. Explained that those options are not guaranteed to work and therefore are not the definitive solution of his problem, thus leaving him with the possibility of having this problem again in the future. Family and patient demonstrated understanding and wished to think about it. Also requested Dr. Levi, well known to patient's sister.     : [id number]    REVIEW OF SYSTEMS:  Negative except stated above in HPI    PAST MEDICAL & SURGICAL HISTORY:  No pertinent past medical history  No pertinent past surgical history    MEDICATIONS  (STANDING):  ciprofloxacin   IVPB 400 milliGRAM(s) IV Intermittent every 12 hours  enoxaparin Injectable 40 milliGRAM(s) SubCutaneous daily  lactated ringers. 1000 milliLiter(s) (125 mL/Hr) IV Continuous <Continuous>  metroNIDAZOLE  IVPB 500 milliGRAM(s) IV Intermittent every 8 hours  pantoprazole  Injectable 40 milliGRAM(s) IV Push daily    MEDICATIONS  (PRN):  HYDROmorphone  Injectable 0.5 milliGRAM(s) IV Push every 6 hours PRN Severe Pain (7 - 10)  ketorolac   Injectable 30 milliGRAM(s) IV Push every 6 hours PRN Moderate Pain (4 - 6)    Allergies: No Known Allergies    Vital Signs Last 24 Hrs  T(C): 36.6 (2021 13:04), Max: 36.7 (2021 04:52)  T(F): 97.9 (2021 13:04), Max: 98 (2021 04:52)  HR: 55 (2021 13:04) (52 - 58)  BP: 111/56 (2021 13:04) (102/56 - 131/65)  RR: 18 (2021 13:04) (18 - 18)  SpO2: 99% (2021 13:04) (98% - 100%)    PHYSICAL EXAM:   General: Alert and oriented, not in acute distress  Resp: Breathing unlabored  GI: soft, nontender, nondistended  Extremities: No pedal edema    LABS:                      10.2   7.06  )-----------( 185      ( 2021 07:24 )             32.1              07-03    143  |  108  |  13  ----------------------------<  88  3.7   |  26  |  0.56    Ca    8.5      2021 07:24  Phos  2.9     07-03  Mg     2.1     07-03    TPro  6.7  /  Alb  2.8<L>  /  TBili  0.7  /  DBili  x   /  AST  99<H>  /  ALT  154<H>  /  AlkPhos  135<H>  07-03              Urinalysis Basic - ( 2021 11:28 )  Color: Yellow / Appearance: Clear / S.010 / pH: x  Gluc: x / Ketone: Negative  / Bili: Negative / Urobili: 4   Blood: x / Protein: Negative / Nitrite: Negative   Leuk Esterase: Negative / RBC: 0-2 /HPF / WBC 0-2 /HPF   Sq Epi: x / Non Sq Epi: Negative /HPF / Bacteria: Negative /HPF      RADIOLOGY & ADDITIONAL STUDIES:  < from: CT Abdomen and Pelvis w/ IV Cont (21 @ 17:39) >  FINDINGS:  LOWER CHEST: Grossly stable peripheral densities in both lungs may represent interstitial lung disease. Enlarged bihilar lymph nodes measuring up to 1.5 cm on the right and 1.6 cm on the left.  LIVER: Mild hepatic steatosis. Small left hepatic cyst.  BILE DUCTS: Mildly dilated common bile duct measuring up to 9 mm in caliber.  GALLBLADDER: Within normal limits.  SPLEEN: Within normal limits.  PANCREAS: Mild peripancreatic stranding again noted, suggestive of recurrence of acute pancreatitis. Noevidence for pancreatic necrosis.  ADRENALS: Within normal limits.  KIDNEYS/URETERS: Unremarkable except for a small hypodense lesion in the right kidney, too small to characterize.  BLADDER: Within normal limits.  REPRODUCTIVE ORGANS: Prostatic enlargement is again noted with protrusion into the urinary bladder base.  BOWEL: No bowel obstruction or grossly thickened bowel wall. Appendix within normal limits.  PERITONEUM: No free air or ascites.  VESSELS: Mild calcified atherosclerotic disease.  RETROPERITONEUM/LYMPH NODES: No lymphadenopathy.  ABDOMINAL WALL: Within normal limits.  BONES: Mild degenerative spondylosis.    IMPRESSION:  Mild peripancreatic stranding again noted, suggestive of recurrence of acute pancreatitis. No evidence for pancreatic necrosis. Clinical correlation with pancreatic enzymes is recommended.  Mildly dilated common bile duct. MRCP may be pursued for further evaluation.  Other findings as above.  < end of copied text >    < from: US Hepatic & Pancreatic (21 @ 10:56) >  FINDINGS:  Liver: Within normal limits.  Bile ducts: Normal caliber. Common bile duct measures 7 mm.  Gallbladder: Stones and sludge in the gallbladder lumen. 1.2 cm calculus in the gallbladder neck. Gallbladder wall thickening to 5 mm. No pericholecystic fluid.  Pancreas: Obscured by bowel gas.  Right kidney: 10.0 cm. No hydronephrosis.  Ascites: None.  IVC: Visualized portions are within normal limits.    IMPRESSION:  Suspect acute cholecystitis with cholelithiasis and sludge as well as gallbladder wall thickening.  < end of copied text >

## 2021-07-03 NOTE — PROGRESS NOTE ADULT - PROBLEM SELECTOR PLAN 2
plan as above per RUQ US abdomen performed today  Added Cipro, flagyl  Surgery consulted  GI consult to follow

## 2021-07-03 NOTE — PROGRESS NOTE ADULT - PROBLEM SELECTOR PLAN 4
lipid profile within normal limits  dietary/lifestyle modifications  hepatitis panel testing  outpatient follow-up for HCC surveillance Likely due to suspected gallstone pancreatitis,  given elevated ALP  Improving LFTs today  Continue to monitor  Follow acute hepatitis panel

## 2021-07-03 NOTE — CONSULT NOTE ADULT - ASSESSMENT
70M with PMHx of pancreatitis admitted with gallstone pancreatitis now with acute cholecystitis   LFTs elevated but downtrending; normal Tbili; leukocytosis downtrending, afebrile, vss, lipase downtrending, clinically improving  no evidence for choledocholithiasis at this time: recommend trending LFTs with a hepatic panel    - Continue current care as per medical team with IVF, IV abx, advance diet as clinically indicated  - Patient would like to think about surgical vs. non-surgical options  - Will further discuss with pt and family tomorrow and will f/u regarding their decision  - Discussed with Dr. Llanes

## 2021-07-03 NOTE — PROGRESS NOTE ADULT - PROBLEM SELECTOR PLAN 3
Likely due to suspected gallstone pancreatitis,  given elevated ALP  Improving LFTs today  Continue to monitor  Follow acute hepatitis panel plan as above

## 2021-07-03 NOTE — PROGRESS NOTE ADULT - PROBLEM SELECTOR PLAN 5
IMPROVE VTE Individual Risk Assessment  RISK                                                                Points  [  ] Previous VTE                                                  3  [  ] Thrombophilia                                               2  [  ] Lower limb paralysis                                      2        (unable to hold up >15 seconds)    [  ] Current Cancer                                              2         (within 6 months)  [x  ] Immobilization > 24 hrs                                1  [  ] ICU/CCU stay > 24 hours                              1  [x  ] Age > 60                                                      1  IMPROVE VTE Score _________2, -- for DVT proph  Lovenox 40mg  protonix IV daily for GI ppx lipid profile within normal limits  dietary/lifestyle modifications  hepatitis panel testing  outpatient follow-up for HCC surveillance

## 2021-07-03 NOTE — PROGRESS NOTE ADULT - PROBLEM SELECTOR PLAN 1
CT abdomen shows pancreatic stranding consistent with recurrent acute pancreatitis and   Started on LR @250ml/hour for 24 hours  clear liquid diet, with advancement as tolerated  Pain control with dilaudid for severe pain, toradol for moderate pain  Dr. De La Torre consulted  RUQ US performed today, results pending CT abdomen shows pancreatic stranding consistent with recurrent acute pancreatitis and   Started on LR @250ml/hour for 24 hours  clear liquid diet, with advancement as tolerated  Pain control with dilaudid for severe pain, toradol for moderate pain  Dr. De La Torre consulted

## 2021-07-04 LAB
ALBUMIN SERPL ELPH-MCNC: 2.9 G/DL — LOW (ref 3.5–5)
ALP SERPL-CCNC: 132 U/L — HIGH (ref 40–120)
ALT FLD-CCNC: 107 U/L DA — HIGH (ref 10–60)
ANION GAP SERPL CALC-SCNC: 5 MMOL/L — SIGNIFICANT CHANGE UP (ref 5–17)
AST SERPL-CCNC: 41 U/L — HIGH (ref 10–40)
BILIRUB SERPL-MCNC: 0.5 MG/DL — SIGNIFICANT CHANGE UP (ref 0.2–1.2)
BUN SERPL-MCNC: 8 MG/DL — SIGNIFICANT CHANGE UP (ref 7–18)
CALCIUM SERPL-MCNC: 8.8 MG/DL — SIGNIFICANT CHANGE UP (ref 8.4–10.5)
CHLORIDE SERPL-SCNC: 107 MMOL/L — SIGNIFICANT CHANGE UP (ref 96–108)
CO2 SERPL-SCNC: 29 MMOL/L — SIGNIFICANT CHANGE UP (ref 22–31)
CREAT SERPL-MCNC: 0.67 MG/DL — SIGNIFICANT CHANGE UP (ref 0.5–1.3)
FERRITIN SERPL-MCNC: 101 NG/ML — SIGNIFICANT CHANGE UP (ref 30–400)
FOLATE SERPL-MCNC: 4.8 NG/ML — SIGNIFICANT CHANGE UP
GLUCOSE BLDC GLUCOMTR-MCNC: 102 MG/DL — HIGH (ref 70–99)
GLUCOSE BLDC GLUCOMTR-MCNC: 165 MG/DL — HIGH (ref 70–99)
GLUCOSE BLDC GLUCOMTR-MCNC: 95 MG/DL — SIGNIFICANT CHANGE UP (ref 70–99)
GLUCOSE BLDC GLUCOMTR-MCNC: 98 MG/DL — SIGNIFICANT CHANGE UP (ref 70–99)
GLUCOSE SERPL-MCNC: 94 MG/DL — SIGNIFICANT CHANGE UP (ref 70–99)
HCT VFR BLD CALC: 34.4 % — LOW (ref 39–50)
HGB BLD-MCNC: 11.1 G/DL — LOW (ref 13–17)
IRON SATN MFR SERPL: 19 % — LOW (ref 20–55)
IRON SATN MFR SERPL: 55 UG/DL — LOW (ref 65–170)
LIDOCAIN IGE QN: 480 U/L — HIGH (ref 73–393)
MAGNESIUM SERPL-MCNC: 2 MG/DL — SIGNIFICANT CHANGE UP (ref 1.6–2.6)
MCHC RBC-ENTMCNC: 29.1 PG — SIGNIFICANT CHANGE UP (ref 27–34)
MCHC RBC-ENTMCNC: 32.3 GM/DL — SIGNIFICANT CHANGE UP (ref 32–36)
MCV RBC AUTO: 90.1 FL — SIGNIFICANT CHANGE UP (ref 80–100)
NRBC # BLD: 0 /100 WBCS — SIGNIFICANT CHANGE UP (ref 0–0)
PHOSPHATE SERPL-MCNC: 3.3 MG/DL — SIGNIFICANT CHANGE UP (ref 2.5–4.5)
PLATELET # BLD AUTO: 195 K/UL — SIGNIFICANT CHANGE UP (ref 150–400)
POTASSIUM SERPL-MCNC: 3.7 MMOL/L — SIGNIFICANT CHANGE UP (ref 3.5–5.3)
POTASSIUM SERPL-SCNC: 3.7 MMOL/L — SIGNIFICANT CHANGE UP (ref 3.5–5.3)
PROT SERPL-MCNC: 7.2 G/DL — SIGNIFICANT CHANGE UP (ref 6–8.3)
RBC # BLD: 3.82 M/UL — LOW (ref 4.2–5.8)
RBC # FLD: 14.1 % — SIGNIFICANT CHANGE UP (ref 10.3–14.5)
SODIUM SERPL-SCNC: 141 MMOL/L — SIGNIFICANT CHANGE UP (ref 135–145)
TIBC SERPL-MCNC: 284 UG/DL — SIGNIFICANT CHANGE UP (ref 250–450)
UIBC SERPL-MCNC: 229 UG/DL — SIGNIFICANT CHANGE UP (ref 110–370)
WBC # BLD: 6.22 K/UL — SIGNIFICANT CHANGE UP (ref 3.8–10.5)
WBC # FLD AUTO: 6.22 K/UL — SIGNIFICANT CHANGE UP (ref 3.8–10.5)

## 2021-07-04 PROCEDURE — 99232 SBSQ HOSP IP/OBS MODERATE 35: CPT

## 2021-07-04 PROCEDURE — 99231 SBSQ HOSP IP/OBS SF/LOW 25: CPT

## 2021-07-04 RX ADMIN — Medication 200 MILLIGRAM(S): at 05:49

## 2021-07-04 RX ADMIN — PANTOPRAZOLE SODIUM 40 MILLIGRAM(S): 20 TABLET, DELAYED RELEASE ORAL at 12:10

## 2021-07-04 RX ADMIN — Medication 100 MILLIGRAM(S): at 05:50

## 2021-07-04 RX ADMIN — ENOXAPARIN SODIUM 40 MILLIGRAM(S): 100 INJECTION SUBCUTANEOUS at 12:10

## 2021-07-04 NOTE — PROGRESS NOTE ADULT - PROBLEM SELECTOR PLAN 3
Likely due to suspected gallstone pancreatitis,  given elevated ALP  Improving LFTs today  Continue to monitor  Acute hepatitis panel negative

## 2021-07-04 NOTE — PHYSICAL EXAM
[General Appearance - Alert] : alert [General Appearance - In No Acute Distress] : in no acute distress [Sclera] : the sclera and conjunctiva were normal [PERRL With Normal Accommodation] : pupils were equal in size, round, and reactive to light [Extraocular Movements] : extraocular movements were intact [Outer Ear] : the ears and nose were normal in appearance [Oropharynx] : the oropharynx was normal [Neck Appearance] : the appearance of the neck was normal [Neck Cervical Mass (___cm)] : no neck mass was observed [Jugular Venous Distention Increased] : there was no jugular-venous distention [Thyroid Diffuse Enlargement] : the thyroid was not enlarged [Thyroid Nodule] : there were no palpable thyroid nodules [Auscultation Breath Sounds / Voice Sounds] : lungs were clear to auscultation bilaterally [Heart Rate And Rhythm] : heart rate was normal and rhythm regular [Heart Sounds] : normal S1 and S2 [Heart Sounds Gallop] : no gallops [Murmurs] : no murmurs [Heart Sounds Pericardial Friction Rub] : no pericardial rub [Full Pulse] : the pedal pulses are present [Edema] : there was no peripheral edema [Bowel Sounds] : normal bowel sounds [Abdomen Soft] : soft [Abdomen Tenderness] : non-tender [] : no hepato-splenomegaly [Abdomen Mass (___ Cm)] : no abdominal mass palpated [Cervical Lymph Nodes Enlarged Posterior Bilaterally] : posterior cervical [Cervical Lymph Nodes Enlarged Anterior Bilaterally] : anterior cervical [Supraclavicular Lymph Nodes Enlarged Bilaterally] : supraclavicular [Axillary Lymph Nodes Enlarged Bilaterally] : axillary [Femoral Lymph Nodes Enlarged Bilaterally] : femoral [Inguinal Lymph Nodes Enlarged Bilaterally] : inguinal

## 2021-07-04 NOTE — HISTORY OF PRESENT ILLNESS
[de-identified] : This is a 70-year-old male with no past medical and surgical history. He was referred by the hospital or nausea vomiting and pancreatitis on May 26, 2021. Since that discharge he states he is unable to eat fatty meals. After eating a fatty meal he noticed increased bloating and some nausea and vomiting. The symptoms are relieved after self-induced vomiting. The patient only takes over-the-counter vitamins. He never had a colonoscopy for endoscopy. He denies alcohol use or drug use. He is from [place]. He has no family history of GI issues or cancer. The patient states he takes some type of This extract smoothly for the last one to 2 months prior to his hospital admission.

## 2021-07-04 NOTE — ASSESSMENT
[FreeTextEntry1] : This patient has some nausea vomiting and bloating. My plan will be\par \par LFTs\par HIDA scan with ejection fraction\par The last ACE level\par CT of the abdomen with pancreatic protocol because the patient is 70 years old with unexplained pancreatitis.\par After his next followup he need a colonoscopy\par \par  # [id number]

## 2021-07-04 NOTE — PROGRESS NOTE ADULT - PROBLEM SELECTOR PLAN 5
IMPROVE VTE Individual Risk Assessment  RISK                                                                Points  [  ] Previous VTE                                                  3  [  ] Thrombophilia                                               2  [  ] Lower limb paralysis                                      2        (unable to hold up >15 seconds)    [  ] Current Cancer                                              2         (within 6 months)  [x  ] Immobilization > 24 hrs                                1  [  ] ICU/CCU stay > 24 hours                              1  [x  ] Age > 60                                                      1  IMPROVE VTE Score _________2, -- for DVT proph  Lovenox 40mg  protonix IV daily for GI ppx

## 2021-07-04 NOTE — PROGRESS NOTE ADULT - PROBLEM SELECTOR PLAN 1
CT abdomen shows pancreatic stranding consistent with recurrent acute pancreatitis, without evidence of gallstones  RUQ US significant for gallbladder enlargement and biliary sludge, no pericholecystic fluid  IV antibiotics initially started, however patient does not have leukocytosis, no febrile episodes, abdominal pain resolved; likely findings all due to gallstone pancreatitis, therefore antibiotics discontinued  Pain control with dilaudid for severe pain, toradol for moderate pain, however patient has not been requiring any pain medication overnight  Surgery consulted and on board; per patient, he is agreeable for surgical intervention  Cholecystectomy to be planned for Tuesday/Wednesday per OR availability  Will obtain routine EKG for risk stratification, however likely no contraindications for surgical intervention at this time

## 2021-07-05 ENCOUNTER — TRANSCRIPTION ENCOUNTER (OUTPATIENT)
Age: 71
End: 2021-07-05

## 2021-07-05 LAB
ALBUMIN SERPL ELPH-MCNC: 3.4 G/DL — LOW (ref 3.5–5)
ALBUMIN SERPL ELPH-MCNC: 4.1 G/DL
ALP BLD-CCNC: 107 U/L
ALP SERPL-CCNC: 139 U/L — HIGH (ref 40–120)
ALT FLD-CCNC: 93 U/L DA — HIGH (ref 10–60)
ALT SERPL-CCNC: 28 U/L
ANION GAP SERPL CALC-SCNC: 10 MMOL/L — SIGNIFICANT CHANGE UP (ref 5–17)
APTT BLD: 37.4 SEC — HIGH (ref 27.5–35.5)
AST SERPL-CCNC: 17 U/L
AST SERPL-CCNC: 27 U/L — SIGNIFICANT CHANGE UP (ref 10–40)
BILIRUB DIRECT SERPL-MCNC: 0.1 MG/DL
BILIRUB INDIRECT SERPL-MCNC: 0.3 MG/DL
BILIRUB SERPL-MCNC: 0.4 MG/DL
BILIRUB SERPL-MCNC: 0.5 MG/DL — SIGNIFICANT CHANGE UP (ref 0.2–1.2)
BLD GP AB SCN SERPL QL: SIGNIFICANT CHANGE UP
BUN SERPL-MCNC: 17 MG/DL — SIGNIFICANT CHANGE UP (ref 7–18)
CALCIUM SERPL-MCNC: 9.6 MG/DL — SIGNIFICANT CHANGE UP (ref 8.4–10.5)
CHLORIDE SERPL-SCNC: 105 MMOL/L — SIGNIFICANT CHANGE UP (ref 96–108)
CO2 SERPL-SCNC: 25 MMOL/L — SIGNIFICANT CHANGE UP (ref 22–31)
CREAT SERPL-MCNC: 0.86 MG/DL — SIGNIFICANT CHANGE UP (ref 0.5–1.3)
GLUCOSE SERPL-MCNC: 129 MG/DL — HIGH (ref 70–99)
HBV SURFACE AB SER-ACNC: SIGNIFICANT CHANGE UP
HCT VFR BLD CALC: 39.5 % — SIGNIFICANT CHANGE UP (ref 39–50)
HGB BLD-MCNC: 12.6 G/DL — LOW (ref 13–17)
INR BLD: 1.17 RATIO — HIGH (ref 0.88–1.16)
MAGNESIUM SERPL-MCNC: 2.1 MG/DL — SIGNIFICANT CHANGE UP (ref 1.6–2.6)
MCHC RBC-ENTMCNC: 28.7 PG — SIGNIFICANT CHANGE UP (ref 27–34)
MCHC RBC-ENTMCNC: 31.9 GM/DL — LOW (ref 32–36)
MCV RBC AUTO: 90 FL — SIGNIFICANT CHANGE UP (ref 80–100)
NRBC # BLD: 0 /100 WBCS — SIGNIFICANT CHANGE UP (ref 0–0)
PHOSPHATE SERPL-MCNC: 3.8 MG/DL — SIGNIFICANT CHANGE UP (ref 2.5–4.5)
PLATELET # BLD AUTO: 273 K/UL — SIGNIFICANT CHANGE UP (ref 150–400)
POTASSIUM SERPL-MCNC: 3.8 MMOL/L — SIGNIFICANT CHANGE UP (ref 3.5–5.3)
POTASSIUM SERPL-SCNC: 3.8 MMOL/L — SIGNIFICANT CHANGE UP (ref 3.5–5.3)
PROT SERPL-MCNC: 7.7 G/DL
PROT SERPL-MCNC: 8.7 G/DL — HIGH (ref 6–8.3)
PROTHROM AB SERPL-ACNC: 13.8 SEC — HIGH (ref 10.6–13.6)
RBC # BLD: 4.39 M/UL — SIGNIFICANT CHANGE UP (ref 4.2–5.8)
RBC # FLD: 13.5 % — SIGNIFICANT CHANGE UP (ref 10.3–14.5)
SARS-COV-2 RNA SPEC QL NAA+PROBE: SIGNIFICANT CHANGE UP
SODIUM SERPL-SCNC: 140 MMOL/L — SIGNIFICANT CHANGE UP (ref 135–145)
WBC # BLD: 9.96 K/UL — SIGNIFICANT CHANGE UP (ref 3.8–10.5)
WBC # FLD AUTO: 9.96 K/UL — SIGNIFICANT CHANGE UP (ref 3.8–10.5)

## 2021-07-05 PROCEDURE — 99232 SBSQ HOSP IP/OBS MODERATE 35: CPT | Mod: GC

## 2021-07-05 RX ORDER — SIMETHICONE 80 MG/1
80 TABLET, CHEWABLE ORAL ONCE
Refills: 0 | Status: COMPLETED | OUTPATIENT
Start: 2021-07-05 | End: 2021-07-05

## 2021-07-05 RX ORDER — CHLORHEXIDINE GLUCONATE 213 G/1000ML
1 SOLUTION TOPICAL ONCE
Refills: 0 | Status: DISCONTINUED | OUTPATIENT
Start: 2021-07-05 | End: 2021-07-07

## 2021-07-05 RX ORDER — SODIUM CHLORIDE 9 MG/ML
1000 INJECTION, SOLUTION INTRAVENOUS
Refills: 0 | Status: DISCONTINUED | OUTPATIENT
Start: 2021-07-06 | End: 2021-07-07

## 2021-07-05 RX ADMIN — ENOXAPARIN SODIUM 40 MILLIGRAM(S): 100 INJECTION SUBCUTANEOUS at 11:03

## 2021-07-05 RX ADMIN — Medication 30 MILLIGRAM(S): at 16:35

## 2021-07-05 RX ADMIN — HYDROMORPHONE HYDROCHLORIDE 0.5 MILLIGRAM(S): 2 INJECTION INTRAMUSCULAR; INTRAVENOUS; SUBCUTANEOUS at 11:23

## 2021-07-05 RX ADMIN — Medication 30 MILLIGRAM(S): at 16:18

## 2021-07-05 RX ADMIN — HYDROMORPHONE HYDROCHLORIDE 0.5 MILLIGRAM(S): 2 INJECTION INTRAMUSCULAR; INTRAVENOUS; SUBCUTANEOUS at 11:08

## 2021-07-05 RX ADMIN — SIMETHICONE 80 MILLIGRAM(S): 80 TABLET, CHEWABLE ORAL at 17:11

## 2021-07-05 RX ADMIN — PANTOPRAZOLE SODIUM 40 MILLIGRAM(S): 20 TABLET, DELAYED RELEASE ORAL at 11:03

## 2021-07-05 NOTE — PROGRESS NOTE ADULT - PROBLEM SELECTOR PLAN 1
CT abdomen shows pancreatic stranding consistent with recurrent acute pancreatitis, without evidence of gallstones  RUQ US significant for gallbladder enlargement and biliary sludge, no pericholecystic fluid  IV antibiotics initially started, however patient does not have leukocytosis, no febrile episodes, abdominal pain resolved; likely findings all due to gallstone pancreatitis, therefore antibiotics discontinued  Pain control with dilaudid for severe pain, toradol for moderate pain, however patient has not been requiring any pain medication overnight  Surgery consulted and on board; per patient, he is agreeable for surgical intervention  Cholecystectomy to be planned for Tuesday/Wednesday per OR availability  Reviewed EKG- no contraindications for surgical intervention at this time  Pt switched to clear liquid diet per request given increased abdominal pain with solid low fat foods  NPO after midnight CT abdomen shows pancreatic stranding consistent with recurrent acute pancreatitis, without evidence of gallstones  RUQ US significant for gallbladder enlargement and biliary sludge, no pericholecystic fluid  IV antibiotics initially started, however patient does not have leukocytosis, no febrile episodes, abdominal pain resolved; likely findings all due to gallstone pancreatitis, therefore antibiotics discontinued  Pain control with dilaudid for severe pain, toradol for moderate pain, however patient has not been requiring any pain medication overnight  Surgery consulted and on board; per patient, he is agreeable for surgical intervention  Cholecystectomy to be planned for Tuesday/Wednesday per OR availability  Reviewed EKG- no contraindications for surgical intervention at this time  Pt switched to clear liquid diet per request given increased abdominal pain with solid low fat foods  NPO after midnight for procedure, D5 IV fluids ordered for AM

## 2021-07-05 NOTE — PROGRESS NOTE ADULT - PROBLEM SELECTOR PLAN 5
IMPROVE VTE Individual Risk Assessment  RISK                                                                Points  [  ] Previous VTE                                                  3  [  ] Thrombophilia                                               2  [  ] Lower limb paralysis                                      2        (unable to hold up >15 seconds)    [  ] Current Cancer                                              2         (within 6 months)  [x  ] Immobilization > 24 hrs                                1  [  ] ICU/CCU stay > 24 hours                              1  [x  ] Age > 60                                                      1  IMPROVE VTE Score _________2, -- for DVT proph  Lovenox 40mg for dvt prophylaxis  (to be held for tomorrow)  protonix IV daily for GI ppx

## 2021-07-05 NOTE — PROGRESS NOTE ADULT - PROBLEM SELECTOR PLAN 4
lipid profile within normal limits  dietary/lifestyle modifications  acute hepatitis panel negative  outpatient follow-up for HCC surveillance

## 2021-07-06 ENCOUNTER — RESULT REVIEW (OUTPATIENT)
Age: 71
End: 2021-07-06

## 2021-07-06 DIAGNOSIS — Z71.89 OTHER SPECIFIED COUNSELING: ICD-10-CM

## 2021-07-06 DIAGNOSIS — Z02.9 ENCOUNTER FOR ADMINISTRATIVE EXAMINATIONS, UNSPECIFIED: ICD-10-CM

## 2021-07-06 LAB
ALBUMIN SERPL ELPH-MCNC: 3.2 G/DL — LOW (ref 3.5–5)
ALP SERPL-CCNC: 242 U/L — HIGH (ref 40–120)
ALT FLD-CCNC: 245 U/L DA — HIGH (ref 10–60)
ANION GAP SERPL CALC-SCNC: 7 MMOL/L — SIGNIFICANT CHANGE UP (ref 5–17)
APTT BLD: 32.4 SEC — SIGNIFICANT CHANGE UP (ref 27.5–35.5)
AST SERPL-CCNC: 180 U/L — HIGH (ref 10–40)
BILIRUB SERPL-MCNC: 0.6 MG/DL — SIGNIFICANT CHANGE UP (ref 0.2–1.2)
BUN SERPL-MCNC: 15 MG/DL — SIGNIFICANT CHANGE UP (ref 7–18)
CALCIUM SERPL-MCNC: 9.2 MG/DL — SIGNIFICANT CHANGE UP (ref 8.4–10.5)
CHLORIDE SERPL-SCNC: 105 MMOL/L — SIGNIFICANT CHANGE UP (ref 96–108)
CO2 SERPL-SCNC: 28 MMOL/L — SIGNIFICANT CHANGE UP (ref 22–31)
CREAT SERPL-MCNC: 0.67 MG/DL — SIGNIFICANT CHANGE UP (ref 0.5–1.3)
GLUCOSE BLDC GLUCOMTR-MCNC: 107 MG/DL — HIGH (ref 70–99)
GLUCOSE BLDC GLUCOMTR-MCNC: 83 MG/DL — SIGNIFICANT CHANGE UP (ref 70–99)
GLUCOSE SERPL-MCNC: 89 MG/DL — SIGNIFICANT CHANGE UP (ref 70–99)
HCT VFR BLD CALC: 37.9 % — LOW (ref 39–50)
HGB BLD-MCNC: 12.3 G/DL — LOW (ref 13–17)
INR BLD: 1.15 RATIO — SIGNIFICANT CHANGE UP (ref 0.88–1.16)
MAGNESIUM SERPL-MCNC: 2.2 MG/DL — SIGNIFICANT CHANGE UP (ref 1.6–2.6)
MCHC RBC-ENTMCNC: 28.9 PG — SIGNIFICANT CHANGE UP (ref 27–34)
MCHC RBC-ENTMCNC: 32.5 GM/DL — SIGNIFICANT CHANGE UP (ref 32–36)
MCV RBC AUTO: 89.2 FL — SIGNIFICANT CHANGE UP (ref 80–100)
NRBC # BLD: 0 /100 WBCS — SIGNIFICANT CHANGE UP (ref 0–0)
PLATELET # BLD AUTO: 240 K/UL — SIGNIFICANT CHANGE UP (ref 150–400)
POTASSIUM SERPL-MCNC: 3.9 MMOL/L — SIGNIFICANT CHANGE UP (ref 3.5–5.3)
POTASSIUM SERPL-SCNC: 3.9 MMOL/L — SIGNIFICANT CHANGE UP (ref 3.5–5.3)
PROT SERPL-MCNC: 7.8 G/DL — SIGNIFICANT CHANGE UP (ref 6–8.3)
PROTHROM AB SERPL-ACNC: 13.6 SEC — SIGNIFICANT CHANGE UP (ref 10.6–13.6)
RBC # BLD: 4.25 M/UL — SIGNIFICANT CHANGE UP (ref 4.2–5.8)
RBC # FLD: 13.8 % — SIGNIFICANT CHANGE UP (ref 10.3–14.5)
SODIUM SERPL-SCNC: 140 MMOL/L — SIGNIFICANT CHANGE UP (ref 135–145)
WBC # BLD: 6.52 K/UL — SIGNIFICANT CHANGE UP (ref 3.8–10.5)
WBC # FLD AUTO: 6.52 K/UL — SIGNIFICANT CHANGE UP (ref 3.8–10.5)

## 2021-07-06 PROCEDURE — 47563 LAPARO CHOLECYSTECTOMY/GRAPH: CPT

## 2021-07-06 PROCEDURE — 88304 TISSUE EXAM BY PATHOLOGIST: CPT | Mod: 26

## 2021-07-06 PROCEDURE — 47563 LAPARO CHOLECYSTECTOMY/GRAPH: CPT | Mod: AS

## 2021-07-06 PROCEDURE — 99233 SBSQ HOSP IP/OBS HIGH 50: CPT | Mod: GC

## 2021-07-06 RX ORDER — ENOXAPARIN SODIUM 100 MG/ML
40 INJECTION SUBCUTANEOUS DAILY
Refills: 0 | Status: DISCONTINUED | OUTPATIENT
Start: 2021-07-06 | End: 2021-07-07

## 2021-07-06 RX ORDER — KETOROLAC TROMETHAMINE 30 MG/ML
15 SYRINGE (ML) INJECTION EVERY 6 HOURS
Refills: 0 | Status: DISCONTINUED | OUTPATIENT
Start: 2021-07-06 | End: 2021-07-07

## 2021-07-06 RX ORDER — HYDROMORPHONE HYDROCHLORIDE 2 MG/ML
0.5 INJECTION INTRAMUSCULAR; INTRAVENOUS; SUBCUTANEOUS
Refills: 0 | Status: DISCONTINUED | OUTPATIENT
Start: 2021-07-06 | End: 2021-07-06

## 2021-07-06 RX ORDER — HYDROMORPHONE HYDROCHLORIDE 2 MG/ML
1 INJECTION INTRAMUSCULAR; INTRAVENOUS; SUBCUTANEOUS EVERY 6 HOURS
Refills: 0 | Status: DISCONTINUED | OUTPATIENT
Start: 2021-07-06 | End: 2021-07-07

## 2021-07-06 RX ORDER — SODIUM CHLORIDE 9 MG/ML
1000 INJECTION, SOLUTION INTRAVENOUS
Refills: 0 | Status: DISCONTINUED | OUTPATIENT
Start: 2021-07-06 | End: 2021-07-06

## 2021-07-06 RX ORDER — HYDROMORPHONE HYDROCHLORIDE 2 MG/ML
1 INJECTION INTRAMUSCULAR; INTRAVENOUS; SUBCUTANEOUS
Refills: 0 | Status: DISCONTINUED | OUTPATIENT
Start: 2021-07-06 | End: 2021-07-06

## 2021-07-06 RX ORDER — ACETAMINOPHEN 500 MG
650 TABLET ORAL EVERY 6 HOURS
Refills: 0 | Status: DISCONTINUED | OUTPATIENT
Start: 2021-07-06 | End: 2021-07-07

## 2021-07-06 RX ADMIN — HYDROMORPHONE HYDROCHLORIDE 1 MILLIGRAM(S): 2 INJECTION INTRAMUSCULAR; INTRAVENOUS; SUBCUTANEOUS at 20:45

## 2021-07-06 RX ADMIN — HYDROMORPHONE HYDROCHLORIDE 1 MILLIGRAM(S): 2 INJECTION INTRAMUSCULAR; INTRAVENOUS; SUBCUTANEOUS at 20:19

## 2021-07-06 RX ADMIN — PANTOPRAZOLE SODIUM 40 MILLIGRAM(S): 20 TABLET, DELAYED RELEASE ORAL at 11:59

## 2021-07-06 NOTE — PROGRESS NOTE ADULT - PROBLEM SELECTOR PLAN 3
Elevated liver enzymes    CT abd/pelvis as above  Abd US as above   US last month normal  plan for lap shadi for gallstone pancreatitis 7/6

## 2021-07-06 NOTE — PROGRESS NOTE ADULT - PROBLEM SELECTOR PLAN 5
[employer] # [id number]  Pt has a full capacity for health decision. FUll code  discussed about plan of care.

## 2021-07-06 NOTE — PROGRESS NOTE ADULT - PROBLEM SELECTOR PLAN 1
Pancreatitis vs Appendicitis vs GERD  Rt lower quadrant and epigastric pain  Vitals stable  s/p 2L bolus and morphine in ED  Lipase: >30,000  calcium 8.8, Follow ionized Ca  Follow TG  Follow CT abd  Started on LR @250ml/hour  Pain control with dilaudid   Will avoid morphine as it can increase sphincter of oddi pressure  c/w  protonix   NPO except meds  Advance diet as soon as possible as tolerated.  Dr. De La Torre consulted  surgery following  Scheduled for lap shadi 7/6

## 2021-07-06 NOTE — PROGRESS NOTE ADULT - PROBLEM SELECTOR PLAN 6
scheduled for lap shadi 7/6  when optimized, dc home
IMPROVE VTE Individual Risk Assessment  RISK                                                                Points  [  ] Previous VTE                                                  3  [  ] Thrombophilia                                               2  [  ] Lower limb paralysis                                      2        (unable to hold up >15 seconds)    [  ] Current Cancer                                              2         (within 6 months)  [x  ] Immobilization > 24 hrs                                1  [  ] ICU/CCU stay > 24 hours                              1  [x  ] Age > 60                                                      1  IMPROVE VTE Score _________2, -- for DVT proph  Lovenox 40mg  protonix IV daily for GI ppx

## 2021-07-06 NOTE — BRIEF OPERATIVE NOTE - NSICDXBRIEFPROCEDURE_GEN_ALL_CORE_FT
PROCEDURES:  Laparoscopic cholecystectomy with intraoperative cholangiography 06-Jul-2021 19:04:13  Dora Moon

## 2021-07-07 ENCOUNTER — TRANSCRIPTION ENCOUNTER (OUTPATIENT)
Age: 71
End: 2021-07-07

## 2021-07-07 VITALS
SYSTOLIC BLOOD PRESSURE: 101 MMHG | HEART RATE: 59 BPM | TEMPERATURE: 98 F | RESPIRATION RATE: 18 BRPM | DIASTOLIC BLOOD PRESSURE: 60 MMHG | OXYGEN SATURATION: 98 %

## 2021-07-07 LAB
ALBUMIN SERPL ELPH-MCNC: 3.1 G/DL — LOW (ref 3.5–5)
ALP SERPL-CCNC: 190 U/L — HIGH (ref 40–120)
ALT FLD-CCNC: 173 U/L DA — HIGH (ref 10–60)
ANION GAP SERPL CALC-SCNC: 5 MMOL/L — SIGNIFICANT CHANGE UP (ref 5–17)
AST SERPL-CCNC: 81 U/L — HIGH (ref 10–40)
BILIRUB SERPL-MCNC: 0.4 MG/DL — SIGNIFICANT CHANGE UP (ref 0.2–1.2)
BUN SERPL-MCNC: 13 MG/DL — SIGNIFICANT CHANGE UP (ref 7–18)
CALCIUM SERPL-MCNC: 8.5 MG/DL — SIGNIFICANT CHANGE UP (ref 8.4–10.5)
CHLORIDE SERPL-SCNC: 103 MMOL/L — SIGNIFICANT CHANGE UP (ref 96–108)
CO2 SERPL-SCNC: 28 MMOL/L — SIGNIFICANT CHANGE UP (ref 22–31)
CREAT SERPL-MCNC: 0.74 MG/DL — SIGNIFICANT CHANGE UP (ref 0.5–1.3)
GLUCOSE BLDC GLUCOMTR-MCNC: 115 MG/DL — HIGH (ref 70–99)
GLUCOSE BLDC GLUCOMTR-MCNC: 125 MG/DL — HIGH (ref 70–99)
GLUCOSE SERPL-MCNC: 134 MG/DL — HIGH (ref 70–99)
HCT VFR BLD CALC: 36.2 % — LOW (ref 39–50)
HGB BLD-MCNC: 11.6 G/DL — LOW (ref 13–17)
MCHC RBC-ENTMCNC: 28.6 PG — SIGNIFICANT CHANGE UP (ref 27–34)
MCHC RBC-ENTMCNC: 32 GM/DL — SIGNIFICANT CHANGE UP (ref 32–36)
MCV RBC AUTO: 89.4 FL — SIGNIFICANT CHANGE UP (ref 80–100)
NRBC # BLD: 0 /100 WBCS — SIGNIFICANT CHANGE UP (ref 0–0)
PLATELET # BLD AUTO: 229 K/UL — SIGNIFICANT CHANGE UP (ref 150–400)
POTASSIUM SERPL-MCNC: 4.2 MMOL/L — SIGNIFICANT CHANGE UP (ref 3.5–5.3)
POTASSIUM SERPL-SCNC: 4.2 MMOL/L — SIGNIFICANT CHANGE UP (ref 3.5–5.3)
PROT SERPL-MCNC: 7.7 G/DL — SIGNIFICANT CHANGE UP (ref 6–8.3)
RBC # BLD: 4.05 M/UL — LOW (ref 4.2–5.8)
RBC # FLD: 13.8 % — SIGNIFICANT CHANGE UP (ref 10.3–14.5)
SODIUM SERPL-SCNC: 136 MMOL/L — SIGNIFICANT CHANGE UP (ref 135–145)
WBC # BLD: 8.24 K/UL — SIGNIFICANT CHANGE UP (ref 3.8–10.5)
WBC # FLD AUTO: 8.24 K/UL — SIGNIFICANT CHANGE UP (ref 3.8–10.5)

## 2021-07-07 PROCEDURE — 81001 URINALYSIS AUTO W/SCOPE: CPT

## 2021-07-07 PROCEDURE — 83690 ASSAY OF LIPASE: CPT

## 2021-07-07 PROCEDURE — 80074 ACUTE HEPATITIS PANEL: CPT

## 2021-07-07 PROCEDURE — 86706 HEP B SURFACE ANTIBODY: CPT

## 2021-07-07 PROCEDURE — 86850 RBC ANTIBODY SCREEN: CPT

## 2021-07-07 PROCEDURE — 87635 SARS-COV-2 COVID-19 AMP PRB: CPT

## 2021-07-07 PROCEDURE — 82962 GLUCOSE BLOOD TEST: CPT

## 2021-07-07 PROCEDURE — 82728 ASSAY OF FERRITIN: CPT

## 2021-07-07 PROCEDURE — 80053 COMPREHEN METABOLIC PANEL: CPT

## 2021-07-07 PROCEDURE — 80061 LIPID PANEL: CPT

## 2021-07-07 PROCEDURE — 86769 SARS-COV-2 COVID-19 ANTIBODY: CPT

## 2021-07-07 PROCEDURE — 86900 BLOOD TYPING SEROLOGIC ABO: CPT

## 2021-07-07 PROCEDURE — 96374 THER/PROPH/DIAG INJ IV PUSH: CPT

## 2021-07-07 PROCEDURE — 36415 COLL VENOUS BLD VENIPUNCTURE: CPT

## 2021-07-07 PROCEDURE — 82746 ASSAY OF FOLIC ACID SERUM: CPT

## 2021-07-07 PROCEDURE — 86901 BLOOD TYPING SEROLOGIC RH(D): CPT

## 2021-07-07 PROCEDURE — 84478 ASSAY OF TRIGLYCERIDES: CPT

## 2021-07-07 PROCEDURE — 88304 TISSUE EXAM BY PATHOLOGIST: CPT

## 2021-07-07 PROCEDURE — 85025 COMPLETE CBC W/AUTO DIFF WBC: CPT

## 2021-07-07 PROCEDURE — 83550 IRON BINDING TEST: CPT

## 2021-07-07 PROCEDURE — 99285 EMERGENCY DEPT VISIT HI MDM: CPT | Mod: 25

## 2021-07-07 PROCEDURE — 82977 ASSAY OF GGT: CPT

## 2021-07-07 PROCEDURE — 83605 ASSAY OF LACTIC ACID: CPT

## 2021-07-07 PROCEDURE — 76000 FLUOROSCOPY <1 HR PHYS/QHP: CPT

## 2021-07-07 PROCEDURE — 96361 HYDRATE IV INFUSION ADD-ON: CPT

## 2021-07-07 PROCEDURE — 83540 ASSAY OF IRON: CPT

## 2021-07-07 PROCEDURE — 85027 COMPLETE CBC AUTOMATED: CPT

## 2021-07-07 PROCEDURE — 85730 THROMBOPLASTIN TIME PARTIAL: CPT

## 2021-07-07 PROCEDURE — 74177 CT ABD & PELVIS W/CONTRAST: CPT

## 2021-07-07 PROCEDURE — 84100 ASSAY OF PHOSPHORUS: CPT

## 2021-07-07 PROCEDURE — 76705 ECHO EXAM OF ABDOMEN: CPT

## 2021-07-07 PROCEDURE — 85610 PROTHROMBIN TIME: CPT

## 2021-07-07 PROCEDURE — 82330 ASSAY OF CALCIUM: CPT

## 2021-07-07 PROCEDURE — 83735 ASSAY OF MAGNESIUM: CPT

## 2021-07-07 PROCEDURE — 93005 ELECTROCARDIOGRAM TRACING: CPT

## 2021-07-07 NOTE — DISCHARGE NOTE PROVIDER - CARE PROVIDER_API CALL
Juan Francisco Llanes (MD)  Surgery  [address] [place] [address]  Phone: [phone]  Fax: [phone]  Follow Up Time:

## 2021-07-07 NOTE — DISCHARGE NOTE PROVIDER - NSDCCPTREATMENT_GEN_ALL_CORE_FT
PRINCIPAL PROCEDURE  Procedure: Laparoscopic cholecystectomy w cholangiography  Findings and Treatment:

## 2021-07-07 NOTE — DISCHARGE NOTE NURSING/CASE MANAGEMENT/SOCIAL WORK - PATIENT PORTAL LINK FT
You can access the FollowMyHealth Patient Portal offered by [facility] by registering at the following website: [email or web address]. By joining [facility]’s FollowMyHealth portal, you will also be able to view your health information using other applications (apps) compatible with our system.

## 2021-07-07 NOTE — PROGRESS NOTE ADULT - SUBJECTIVE AND OBJECTIVE BOX
Patient is a 70y old  Male who presents with a chief complaint of abdominal pain (2021 14:43)      INTERVAL HPI/OVERNIGHT EVENTS: Spoke to patient in Portuguese. Pt states pain has improved, would like to try clear liquids and possibly moore liquids      T(C): 36.7 (21 @ 04:52), Max: 36.7 (21 @ 04:52)  HR: 58 (21 @ 04:52) (52 - 58)  BP: 102/56 (21 @ 04:52) (102/56 - 131/65)  RR: 18 (21 @ 04:52) (18 - 18)  SpO2: 98% (21 @ 04:52) (98% - 100%)      REVIEW OF SYSTEMS: denies fever, chills, SOB, palpitations, chest pain, abdominal pain, nausea, vomiting, diarrhea, constipation, dizziness    MEDICATIONS  (STANDING):  enoxaparin Injectable 40 milliGRAM(s) SubCutaneous daily  lactated ringers. 1000 milliLiter(s) (250 mL/Hr) IV Continuous <Continuous>  pantoprazole  Injectable 40 milliGRAM(s) IV Push daily    MEDICATIONS  (PRN):  HYDROmorphone  Injectable 0.5 milliGRAM(s) IV Push every 6 hours PRN Severe Pain (7 - 10)  ketorolac   Injectable 30 milliGRAM(s) IV Push every 6 hours PRN Moderate Pain (4 - 6)      PHYSICAL EXAM:  GENERAL: NAD, well-developed  HEAD:  Atraumatic, Normocephalic  EYES: EOMI, conjunctiva and sclera clear  ENMT: Moist mucous membranes, Good dentition, No lesions  NECK: Supple, No JVD  NERVOUS SYSTEM:  Alert & Oriented X3, Good concentration  CHEST/LUNG: Clear to auscultation bilaterally; No rales, rhonchi, wheezing, or rubs  HEART: Regular rate and rhythm; No murmurs, rubs, or gallops  ABDOMEN: Soft, Nontender, Nondistended; Bowel sounds present  EXTREMITIES:  2+ Peripheral Pulses, No clubbing, cyanosis, or edema      LABS:                        10.2   7.06  )-----------( 185      ( 2021 07:24 )             32.1     07-    143  |  108  |  13  ----------------------------<  88  3.7   |  26  |  0.56    Ca    8.5      2021 07:24  Phos  2.9     07-  Mg     2.1     07-    TPro  6.7  /  Alb  2.8<L>  /  TBili  0.7  /  DBili  x   /  AST  99<H>  /  ALT  154<H>  /  AlkPhos  135<H>  07-      Urinalysis Basic - ( 2021 11:28 )    Color: Yellow / Appearance: Clear / S.010 / pH: x  Gluc: x / Ketone: Negative  / Bili: Negative / Urobili: 4   Blood: x / Protein: Negative / Nitrite: Negative   Leuk Esterase: Negative / RBC: 0-2 /HPF / WBC 0-2 /HPF   Sq Epi: x / Non Sq Epi: Negative /HPF / Bacteria: Negative /HPF      CAPILLARY BLOOD GLUCOSE      POCT Blood Glucose.: 93 mg/dL (2021 11:25)  POCT Blood Glucose.: 82 mg/dL (2021 06:10)      < from: CT Abdomen and Pelvis w/ IV Cont (21 @ 04:30) >    EXAM:  CT ABDOMEN AND PELVIS IC                            PROCEDURE DATE:  2021          INTERPRETATION:  CLINICAL INFORMATION: Pancreatitis.    COMPARISON: Same day ultrasound    CONTRAST/COMPLICATIONS:  IV Contrast: Omnipaque 350  95 cc administered   5 cc discarded  Oral Contrast: NONE  Complications: None reported at time of study completion    PROCEDURE:  CT of the Abdomen and Pelvis was performed.  Sagittal and coronal reformats were performed.    FINDINGS:  Exam is mildly degradedby motion artifact.    LOWER CHEST: Limited by motion. Bilateral reticulation suggestive of interstitial disease. Mild bilateral hilar adenopathy.    LIVER: Steatosis. 1.2 cm left hepatic lobe cyst.  BILE DUCTS: Normal caliber.  GALLBLADDER: Mildly distended. No definite stones.  SPLEEN: Within normal limits.  PANCREAS: Mild diffuse peripancreatic fluid/stranding. Homogeneous pancreatic enhancement. No organized collection.  ADRENALS: Within normal limits.  KIDNEYS/URETERS: No hydronephrosis. Homogeneous symmetric renal enhancement. 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    BLADDER: Minimally distended.  REPRODUCTIVE ORGANS: Enlarged prostate with protrusion into the bladder base.    BOWEL: No bowel obstruction. Appendix is normal. 6 mm radiographic linear density in the distal transverse colon (2, 35), likely ingested foreign body.  PERITONEUM: No ascites.  VESSELS: Atherosclerotic changes.  RETROPERITONEUM/LYMPH NODES: No lymphadenopathy.  ABDOMINAL WALL: Within normal limits.  BONES: Degenerative changes of the spine most pronounced at L5/S1.    IMPRESSION:  Acute interstitial pancreatitis. No organized collection or evidence of necrosis.    Likely interstitial lung disease, suboptimally evaluated due to motion.    6 mm linear radiopaque foreign body in the distal transverse colon.    < end of copied text >  
Pt s- complaints. no pain. tolerating diet  ICU Vital Signs Last 24 Hrs  T(C): 37 (07 Jul 2021 05:43), Max: 37.1 (07 Jul 2021 00:06)  T(F): 98.6 (07 Jul 2021 05:43), Max: 98.8 (07 Jul 2021 00:06)  HR: 61 (07 Jul 2021 05:43) (56 - 78)  BP: 103/55 (07 Jul 2021 05:43) (98/56 - 145/-)  BP(mean): 85 (06 Jul 2021 20:00) (76 - 85)  ABP: --  ABP(mean): --  RR: 17 (07 Jul 2021 05:43) (16 - 20)  SpO2: 99% (07 Jul 2021 05:43) (95% - 100%)  Alert nad  Abd: soft nt nd dressings cdi                        11.6   8.24  )-----------( 229      ( 07 Jul 2021 06:58 )             36.2   07-07    136  |  103  |  13  ----------------------------<  134<H>  4.2   |  28  |  0.74    Ca    8.5      07 Jul 2021 06:58  Mg     2.2     07-06    TPro  7.7  /  Alb  3.1<L>  /  TBili  0.4  /  DBili  x   /  AST  81<H>  /  ALT  173<H>  /  AlkPhos  190<H>  07-07  
Pt seen at bedside  Patient is a 70y old  Male who presents with a chief complaint of abdominal pain (03 Jul 2021 15:53)      INTERVAL HPI/OVERNIGHT EVENTS:  Pt states pain is improving  Pt unsure of surgery at this time but will d/w family    Vital Signs Last 24 Hrs  T(C): 36.7 (04 Jul 2021 05:32), Max: 36.7 (04 Jul 2021 05:32)  T(F): 98.1 (04 Jul 2021 05:32), Max: 98.1 (04 Jul 2021 05:32)  HR: 68 (04 Jul 2021 05:32) (54 - 68)  BP: 101/57 (04 Jul 2021 05:32) (101/57 - 118/54)  BP(mean): --  RR: 18 (04 Jul 2021 05:32) (18 - 18)  SpO2: 99% (04 Jul 2021 05:32) (99% - 100%)    Physical Exam:    Gen: awake, alert oriented NAD  HEENT:  anicteric  Chest: equal chest rise  Abd: soft NT ND  Ext:  warm to touch no c/c/e      MEDICATIONS  (STANDING):  ciprofloxacin   IVPB 400 milliGRAM(s) IV Intermittent every 12 hours  enoxaparin Injectable 40 milliGRAM(s) SubCutaneous daily  lactated ringers. 1000 milliLiter(s) (125 mL/Hr) IV Continuous <Continuous>  metroNIDAZOLE  IVPB 500 milliGRAM(s) IV Intermittent every 8 hours  pantoprazole  Injectable 40 milliGRAM(s) IV Push daily    MEDICATIONS  (PRN):  HYDROmorphone  Injectable 0.5 milliGRAM(s) IV Push every 6 hours PRN Severe Pain (7 - 10)  ketorolac   Injectable 30 milliGRAM(s) IV Push every 6 hours PRN Moderate Pain (4 - 6)      Labs:                          11.1   6.22  )-----------( 195      ( 04 Jul 2021 06:59 )             34.4     07-04    141  |  107  |  8   ----------------------------<  94  3.7   |  29  |  0.67    Ca    8.8      04 Jul 2021 06:59  Phos  3.3     07-04  Mg     2.0     07-04    TPro  7.2  /  Alb  2.9<L>  /  TBili  0.5  /  DBili  x   /  AST  41<H>  /  ALT  107<H>  /  AlkPhos  132<H>  07-04        I&O's Detail      Radiology:  
POST-OPERATIVE NOTE    Subjective:   70y Male s/p lap shadi POD #0 . Denies nausea, vomiting, chest pain, sob, fevers chills. Pain is well controlled. Ambulating independently. Voiding spontaneously.    Vital Signs Last 24 Hrs  T(C): 37.1 (07 Jul 2021 00:06), Max: 37.1 (07 Jul 2021 00:06)  T(F): 98.8 (07 Jul 2021 00:06), Max: 98.8 (07 Jul 2021 00:06)  HR: 60 (07 Jul 2021 00:06) (55 - 78)  BP: 107/57 (07 Jul 2021 00:06) (98/55 - 145/-)  BP(mean): 85 (06 Jul 2021 20:00) (76 - 85)  RR: 17 (07 Jul 2021 00:06) (16 - 20)  SpO2: 98% (07 Jul 2021 00:06) (95% - 100%)  I&O's Detail    06 Jul 2021 07:01  -  07 Jul 2021 02:10  --------------------------------------------------------  IN:    Lactated Ringers: 75 mL  Total IN: 75 mL    OUT:  Total OUT: 0 mL    Total NET: 75 mL          Physical Exam:  General: NAD, resting comfortably in bed  Pulmonary: Nonlabored breathing, no respiratory distress  Cardiovascular: NSR, S1, S2  Abdominal: soft, NT/ND, dressing c/d/i  Extremities: no edema, no calf tenderness, distal pulses are palpable     LABS:                        12.3   6.52  )-----------( 240      ( 06 Jul 2021 05:52 )             37.9     07-06    140  |  105  |  15  ----------------------------<  89  3.9   |  28  |  0.67    Ca    9.2      06 Jul 2021 05:52  Phos  3.8     07-05  Mg     2.2     07-06    TPro  7.8  /  Alb  3.2<L>  /  TBili  0.6  /  DBili  x   /  AST  180<H>  /  ALT  245<H>  /  AlkPhos  242<H>  07-06    LIVER FUNCTIONS - ( 06 Jul 2021 05:52 )  Alb: 3.2 g/dL / Pro: 7.8 g/dL / ALK PHOS: 242 U/L / ALT: 245 U/L DA / AST: 180 U/L / GGT: x             MEDICATIONS  (STANDING):  chlorhexidine 4% Liquid 1 Application(s) Topical once  dextrose 5% + sodium chloride 0.9%. 1000 milliLiter(s) (75 mL/Hr) IV Continuous <Continuous>  enoxaparin Injectable 40 milliGRAM(s) SubCutaneous daily  pantoprazole  Injectable 40 milliGRAM(s) IV Push daily    MEDICATIONS  (PRN):  acetaminophen   Tablet .. 650 milliGRAM(s) Oral every 6 hours PRN Temp greater or equal to 38C (100.4F), Mild Pain (1 - 3)  HYDROmorphone  Injectable 1 milliGRAM(s) IV Push every 6 hours PRN Severe Pain (7 - 10)  HYDROmorphone  Injectable 0.5 milliGRAM(s) IV Push every 6 hours PRN Severe Pain (7 - 10)  ketorolac   Injectable 30 milliGRAM(s) IV Push every 6 hours PRN Moderate Pain (4 - 6)  ketorolac   Injectable 15 milliGRAM(s) IV Push every 6 hours PRN Moderate Pain (4 - 6)      Assessment:  The patient is a 70y Male who is s/p lap shadi POD #0 Stable    Plan:  - Pain control as needed  -Dressing change prn   - OOB and ambulating as tolerated  - F/u AM labs  - DVT ppx
Surgery Pre-op Note    Preoperative Diagnosis: Gallstone pancreatitis    Planned Procedure: Laparoscopic Cholecystectomy with intraop Cholangiogram                          12.6   9.96  )-----------( 273      ( 05 Jul 2021 07:47 )             39.5       07-05    140  |  105  |  17  ----------------------------<  129<H>  3.8   |  25  |  0.86    Ca    9.6      05 Jul 2021 07:47  Phos  3.8     07-05  Mg     2.1     07-05    TPro  8.7<H>  /  Alb  3.4<L>  /  TBili  0.5  /  DBili  x   /  AST  27  /  ALT  93<H>  /  AlkPhos  139<H>  07-05      LIVER FUNCTIONS - ( 05 Jul 2021 07:47 )  Alb: 3.4 g/dL / Pro: 8.7 g/dL / ALK PHOS: 139 U/L / ALT: 93 U/L DA / AST: 27 U/L / GGT: x             PT/INR - ( 05 Jul 2021 13:25 )   PT: 13.8 sec;   INR: 1.17 ratio         PTT - ( 05 Jul 2021 13:25 )  PTT:37.4 sec    Type & Screen: pending    EKG:  < from: 12 Lead ECG (05.26.21 @ 21:51) >    Diagnosis Line *** Poor data quality, interpretation may be adversely affected  Sinus bradycardia  Otherwise normal ECG    < end of copied text >      
NP Note discussed with  Primary Attending    INTERVAL HPI/OVERNIGHT EVENTS: Seen at bedside.  # [id number], pt remains resting comfortable. states mild pain to right side abdomen. NPO for procedure today. denies nausea, vomiting, chest pain, palpitation or headache. understands about procedure.     MEDICATIONS  (STANDING):  chlorhexidine 4% Liquid 1 Application(s) Topical once  dextrose 5% + sodium chloride 0.9%. 1000 milliLiter(s) (75 mL/Hr) IV Continuous <Continuous>  pantoprazole  Injectable 40 milliGRAM(s) IV Push daily    MEDICATIONS  (PRN):  HYDROmorphone  Injectable 0.5 milliGRAM(s) IV Push every 6 hours PRN Severe Pain (7 - 10)  ketorolac   Injectable 30 milliGRAM(s) IV Push every 6 hours PRN Moderate Pain (4 - 6)      __________________________________________________  REVIEW OF SYSTEMS:    CONSTITUTIONAL: No fever,   EYES: no acute visual disturbances  NECK: No pain or stiffness  RESPIRATORY: No cough; No shortness of breath  CARDIOVASCULAR: No chest pain, no palpitations  GASTROINTESTINAL: mild RUQ pain. No nausea or vomiting; No diarrhea   NEUROLOGICAL: No headache or numbness, no tremors  MUSCULOSKELETAL: No joint pain, no muscle pain  GENITOURINARY: no dysuria, no frequency, no hesitancy  PSYCHIATRY: no depression , no anxiety  ALL OTHER  ROS negative        Vital Signs Last 24 Hrs  T(C): 36.7 (06 Jul 2021 10:56), Max: 36.7 (05 Jul 2021 14:16)  T(F): 98 (06 Jul 2021 10:56), Max: 98.1 (05 Jul 2021 14:16)  HR: 60 (06 Jul 2021 10:56) (54 - 60)  BP: 105/60 (06 Jul 2021 10:56) (98/55 - 115/65)  BP(mean): --  RR: 17 (06 Jul 2021 04:35) (16 - 17)  SpO2: 99% (06 Jul 2021 10:56) (99% - 99%)    ________________________________________________  PHYSICAL EXAM:  GENERAL: NAD, conversant, comprehensive in [place]  HEENT: Normocephalic;  conjunctivae and sclerae clear; moist mucous membranes;   NECK : supple  CHEST/LUNG: Clear to auscultation bilaterally with good air entry   HEART: S1 S2  regular; no murmurs, gallops or rubs  ABDOMEN: Soft, Nontender, Nondistended; Bowel sounds present  EXTREMITIES: no cyanosis; no edema; no calf tenderness  SKIN: warm and dry; no rash  NERVOUS SYSTEM:  Awake and alert; Oriented  to place, person and time ; no new deficits    _________________________________________________  LABS:                        12.3   6.52  )-----------( 240      ( 06 Jul 2021 05:52 )             37.9     07-06    140  |  105  |  15  ----------------------------<  89  3.9   |  28  |  0.67    Ca    9.2      06 Jul 2021 05:52  Phos  3.8     07-05  Mg     2.2     07-06    TPro  7.8  /  Alb  3.2<L>  /  TBili  0.6  /  DBili  x   /  AST  180<H>  /  ALT  245<H>  /  AlkPhos  242<H>  07-06    PT/INR - ( 06 Jul 2021 05:52 )   PT: 13.6 sec;   INR: 1.15 ratio         PTT - ( 06 Jul 2021 05:52 )  PTT:32.4 sec    CAPILLARY BLOOD GLUCOSE      POCT Blood Glucose.: 83 mg/dL (06 Jul 2021 08:16)        RADIOLOGY & ADDITIONAL TESTS:    Imaging  Reviewed:  YES    < from: US Hepatic & Pancreatic (07.03.21 @ 10:56) >    EXAM:  US LIVER AND PANCREAS                            PROCEDURE DATE:  07/03/2021          INTERPRETATION:  CLINICAL INFORMATION: 70 years  Male with RUQ, choledocholithiasis.    COMPARISON: Ultrasound 5/26/2021 and CT abdomen and pelvis 7/2/2021    TECHNIQUE: Sonography of the right upper quadrant.    FINDINGS:    Liver: Within normal limits.  Bile ducts: Normal caliber. Common bile duct measures 7 mm.  Gallbladder: Stones and sludge in the gallbladder lumen. 1.2 cm calculus in the gallbladder neck. Gallbladder wall thickening to 5 mm. No pericholecystic fluid.  Pancreas: Obscured by bowel gas.  Right kidney: 10.0 cm. No hydronephrosis.  Ascites: None.  IVC: Visualized portions are within normal limits.    IMPRESSION:    Suspect acute cholecystitis with cholelithiasis and sludge as well as gallbladder wall thickening.    Findings were discussed with Dr. ROMULO BHAKTA [phone] 7/3/2021 1:30 PM by Dr. Muna Ge with read back confirmation.            MUNA GE MD; Attending Radiologist  This document has been electronically signed. Jul  3 2021  1:31PM    < end of copied text >     < from: CT Abdomen and Pelvis w/ IV Cont (07.02.21 @ 17:39) >    EXAM:  CT ABDOMEN AND PELVIS IC                            PROCEDURE DATE:  07/02/2021          INTERPRETATION:  CLINICAL INFORMATION: Epigastric/right upper quadrant abdominal pain    COMPARISON: 5/27/2021    CONTRAST/COMPLICATIONS:  IV Contrast: Omnipaque 350  90 cc administered   10 cc discarded  Oral Contrast: NONE  Complications: None reported at time of study completion    PROCEDURE:  CT of the Abdomen and Pelvis was performed.  Sagittal and coronal reformats were performed.    FINDINGS:  LOWER CHEST: Grossly stable peripheral densities in both lungs may represent interstitial lung disease. Enlarged bihilar lymph nodes measuring up to 1.5 cm on the right and 1.6 cm on the left.    LIVER: Mild hepatic steatosis. Small left hepatic cyst.  BILE DUCTS: Mildly dilated common bile duct measuring up to 9 mm in caliber.  GALLBLADDER: Within normal limits.  SPLEEN: Within normal limits.  PANCREAS: Mild peripancreatic stranding again noted, suggestive of recurrence of acute pancreatitis. Noevidence for pancreatic necrosis.  ADRENALS: Within normal limits.  KIDNEYS/URETERS: Unremarkable except for a small hypodense lesion in the right kidney, too small to characterize.    BLADDER: Within normal limits.  REPRODUCTIVE ORGANS: Prostatic enlargement is again noted with protrusion into the urinary bladder base.    BOWEL: No bowel obstruction or grossly thickened bowel wall. Appendix within normal limits.  PERITONEUM: No free air or ascites.  VESSELS: Mild calcified atherosclerotic disease.  RETROPERITONEUM/LYMPH NODES: No lymphadenopathy.  ABDOMINAL WALL: Within normal limits.  BONES: Mild degenerative spondylosis.    IMPRESSION:  Mild peripancreatic stranding again noted, suggestive of recurrence of acute pancreatitis. No evidence for pancreatic necrosis. Clinical correlation with pancreatic enzymes is recommended.    Mildly dilated common bile duct. MRCP may be pursued for further evaluation.    Other findings as above.            CHARY MENENDEZ MD; Attending Radiologist  This document has been electronically signed. Jul 2 2021  6:15PM    < end of copied text >     Consultant(s) Notes Reviewed:   YES      Plan of care was discussed with patient and /or primary care giver; all questions and concerns were addressed 
Spoke to patient in Micronesian  Patient is a 70y old  Male who presents with a chief complaint of abdominal pain (04 Jul 2021 12:35)      INTERVAL HPI/OVERNIGHT EVENTS: no issues overnight per patient, however is requesting clear liquid diet given regular low fat diet causing increased RUQ pain and irritation      T(C): 36.2 (07-05-21 @ 05:23), Max: 36.7 (07-04-21 @ 14:34)  HR: 53 (07-05-21 @ 05:23) (53 - 70)  BP: 119/67 (07-05-21 @ 05:23) (104/56 - 119/67)  RR: 16 (07-05-21 @ 05:23) (16 - 18)  SpO2: 96% (07-05-21 @ 05:23) (96% - 99%)      REVIEW OF SYSTEMS: denies fever, chills, SOB, palpitations, chest pain, abdominal pain, nausea, vomiting, diarrhea, constipation, dizziness    MEDICATIONS  (STANDING):  chlorhexidine 4% Liquid 1 Application(s) Topical once  enoxaparin Injectable 40 milliGRAM(s) SubCutaneous daily  lactated ringers. 1000 milliLiter(s) (125 mL/Hr) IV Continuous <Continuous>  pantoprazole  Injectable 40 milliGRAM(s) IV Push daily        MEDICATIONS  (PRN):  HYDROmorphone  Injectable 0.5 milliGRAM(s) IV Push every 6 hours PRN Severe Pain (7 - 10)  ketorolac   Injectable 30 milliGRAM(s) IV Push every 6 hours PRN Moderate Pain (4 - 6)      PHYSICAL EXAM:  GENERAL: NAD, well-developed  HEAD:  Atraumatic, Normocephalic  EYES: EOMI, conjunctiva and sclera clear  ENMT: Moist mucous membranes, Good dentition, No lesions  NECK: Supple, No JVD  NERVOUS SYSTEM:  Alert & Oriented X3, Good concentration  CHEST/LUNG: Clear to auscultation bilaterally; No rales, rhonchi, wheezing, or rubs  HEART: Regular rate and rhythm; No murmurs, rubs, or gallops  ABDOMEN: Soft, slight tenderness in RUQ, Nondistended; Bowel sounds present  EXTREMITIES:  2+ Peripheral Pulses, No clubbing, cyanosis, or edema      LABS:                        12.6   9.96  )-----------( 273      ( 05 Jul 2021 07:47 )             39.5     07-05    140  |  105  |  17  ----------------------------<  129<H>  3.8   |  25  |  0.86    Ca    9.6      05 Jul 2021 07:47  Phos  3.8     07-05  Mg     2.1     07-05    TPro  8.7<H>  /  Alb  3.4<L>  /  TBili  0.5  /  DBili  x   /  AST  27  /  ALT  93<H>  /  AlkPhos  139<H>  07-05        CAPILLARY BLOOD GLUCOSE      POCT Blood Glucose.: 102 mg/dL (04 Jul 2021 16:54)        
Spoke to patient in Indonesian      Patient is a 70y old  Male who presents with a chief complaint of abdominal pain (04 Jul 2021 09:38)      INTERVAL HPI/OVERNIGHT EVENTS: pt states his abdominal pain has greatly improved, he only feels mild tenderness in the area; pt is agreeable for the surgery which will be planned for Tuesday/Wednesday depending on OR scheduling per surgical team      T(C): 36.7 (07-04-21 @ 05:32), Max: 36.7 (07-04-21 @ 05:32)  HR: 68 (07-04-21 @ 05:32) (54 - 68)  BP: 101/57 (07-04-21 @ 05:32) (101/57 - 118/54)  RR: 18 (07-04-21 @ 05:32) (18 - 18)  SpO2: 99% (07-04-21 @ 05:32) (99% - 100%)      REVIEW OF SYSTEMS: denies fever, chills, SOB, palpitations, chest pain, abdominal pain, nausea, vomiting, diarrhea, constipation, dizziness    MEDICATIONS  (STANDING):  enoxaparin Injectable 40 milliGRAM(s) SubCutaneous daily  lactated ringers. 1000 milliLiter(s) (125 mL/Hr) IV Continuous <Continuous>  pantoprazole  Injectable 40 milliGRAM(s) IV Push daily    MEDICATIONS  (PRN):  HYDROmorphone  Injectable 0.5 milliGRAM(s) IV Push every 6 hours PRN Severe Pain (7 - 10)  ketorolac   Injectable 30 milliGRAM(s) IV Push every 6 hours PRN Moderate Pain (4 - 6)      PHYSICAL EXAM:  GENERAL: NAD,  well-developed  HEAD:  Atraumatic, Normocephalic  EYES: EOMI,  conjunctiva and sclera clear  ENMT:  Moist mucous membranes, Good dentition, No lesions  NECK: Supple, No JVD   NERVOUS SYSTEM:  Alert & Oriented X3, Good concentration   CHEST/LUNG: Clear to auscultation bilaterally; No rales, rhonchi, wheezing, or rubs  HEART: Regular rate and rhythm; No murmurs, rubs, or gallops  ABDOMEN: Soft, Nontender, Nondistended; Bowel sounds present  EXTREMITIES:  2+ Peripheral Pulses, No clubbing, cyanosis, or edema     LABS:                        11.1   6.22  )-----------( 195      ( 04 Jul 2021 06:59 )             34.4     07-04    141  |  107  |  8   ----------------------------<  94  3.7   |  29  |  0.67    Ca    8.8      04 Jul 2021 06:59  Phos  3.3     07-04  Mg     2.0     07-04    TPro  7.2  /  Alb  2.9<L>  /  TBili  0.5  /  DBili  x   /  AST  41<H>  /  ALT  107<H>  /  AlkPhos  132<H>  07-04        CAPILLARY BLOOD GLUCOSE      POCT Blood Glucose.: 98 mg/dL (04 Jul 2021 12:03)  POCT Blood Glucose.: 95 mg/dL (04 Jul 2021 06:50)  POCT Blood Glucose.: 165 mg/dL (04 Jul 2021 00:10)  POCT Blood Glucose.: 88 mg/dL (03 Jul 2021 17:01)

## 2021-07-07 NOTE — PROGRESS NOTE ADULT - ATTENDING COMMENTS
Doing well postop, plan to d/c home
Recurrent gallstone pancreatitis - now improving - abdominal pain has resolved.     Plan for laparoscopic cholecystectomy Tuesday/Wednesday depending on OR schedule  Would advance to regular diet and d/c antibiotics  Will need medical preop eval
69y/o M with PMH of pancreatitis presented to the Ed with the complaint of severe abdominal pain admitted for recurrent pancreatitis, secondary to gallstones with evidence of distended gallbladder and biliary sludge on RUQ US performed on 7/3/21.    Patient feels well today, has mild abdominal pain. NPO since midnight for for cholecystectomy today.     #Gallstone pancreatitis.  Plan: CT abdomen shows pancreatic stranding consistent with recurrent acute pancreatitis, without evidence of gallstones  RUQ US significant for gallbladder enlargement and biliary sludge, no pericholecystic fluid  - abx discontinued  - NPO since MN for cholecystomy today   - on IVF      #Abdominal pain.  mild pain, currently NPO.   #Transaminitis.    Likely due to suspected gallstone pancreatitis,  given elevated. Downtrending. Hepatitis panel negative   #Hepatic steatosis. lipid profile within normal limits. Dietary/lifestyle modifications. Acute hepatitis panel negative. Outpatient follow-up for HCC surveillance.

## 2021-07-07 NOTE — PROGRESS NOTE ADULT - ASSESSMENT
71y/o M with PMH of pancreatitis presents to the Ed with the complaint of severe abdominal pain. Admitted for pancreatitis. Followed by surgery team. abt CT shows Mild peripancreatic stranding again noted, suggestive of recurrence of acute pancreatitis. No evidence for pancreatic necrosis. Clinical correlation with pancreatic enzymes is recommended.  Mildly dilated common bile duct. abd US shows Suspect acute cholecystitis with cholelithiasis and sludge as well as gallbladder wall thickening.   plan for lap shadi 7/6. NPO status. last COVID neg on 7/5. 
Gallstone pancreatitis  1. Diet as per medicine  2. Patient to d/w family re: surgery  3. Will follow
Gallstone pancreatitis  1. Plan for OR in AM, tentatively  2. NPO after midnight  3. AM labs  4. IV fluids while npo  5. Consent to be obtained
69y/o M with PMH of pancreatitis presents to the Ed with the complaint of severe abdominal pain. Admitted for recurrent pancreatitis, secondary to gallstones with evidence of distended gallbladder and biliary sludge on RUQ US performed on 7/3/21. 
s/p lap shadi  resolving pancreatitis  stable for discharge home, no need abx or opoid analgesics , discussed with Dr Llanes
71y/o M with PMH of pancreatitis presents to the Ed with the complaint of severe abdominal pain. Admitted for recurrent pancreatitis, suspect underlying gallstone pancreatitis. 
69y/o M with PMH of pancreatitis presents to the Ed with the complaint of severe abdominal pain. Admitted for recurrent pancreatitis, secondary to gallstones with evidence of distended gallbladder and biliary sludge on RUQ US performed on 7/3/21. Pt is agreeable for cholecystectomy, to be planned for Tuesday or Wednesday per OR availability.

## 2021-07-07 NOTE — DISCHARGE NOTE PROVIDER - HOSPITAL COURSE
Pt admitted with cholecystitis and evidence of pancreatitis. Pt was given abx and  underwent lap cholecystectemy with a negative Intra operative cholangiogram on 7-6-21.  Post op course uneventful, remains afebrile and tolerating regular diet.

## 2021-07-14 LAB — SURGICAL PATHOLOGY STUDY: SIGNIFICANT CHANGE UP

## 2021-08-04 ENCOUNTER — LABORATORY RESULT (OUTPATIENT)
Age: 71
End: 2021-08-04

## 2021-08-04 ENCOUNTER — APPOINTMENT (OUTPATIENT)
Dept: GASTROENTEROLOGY | Facility: CLINIC | Age: 71
End: 2021-08-04
Payer: MEDICARE

## 2021-08-04 VITALS
DIASTOLIC BLOOD PRESSURE: 56 MMHG | SYSTOLIC BLOOD PRESSURE: 99 MMHG | WEIGHT: 135 LBS | OXYGEN SATURATION: 99 % | TEMPERATURE: 97.3 F | BODY MASS INDEX: 24.69 KG/M2 | HEART RATE: 59 BPM

## 2021-08-04 DIAGNOSIS — Z87.19 PERSONAL HISTORY OF OTHER DISEASES OF THE DIGESTIVE SYSTEM: ICD-10-CM

## 2021-08-04 DIAGNOSIS — K85.90 ACUTE PANCREATITIS WITHOUT NECROSIS OR INFECTION, UNSPECIFIED: ICD-10-CM

## 2021-08-04 PROCEDURE — 99205 OFFICE O/P NEW HI 60 MIN: CPT

## 2021-08-04 PROCEDURE — 99215 OFFICE O/P EST HI 40 MIN: CPT

## 2021-08-04 RX ORDER — PANTOPRAZOLE 40 MG/1
40 TABLET, DELAYED RELEASE ORAL DAILY
Qty: 30 | Refills: 3 | Status: ACTIVE | COMMUNITY
Start: 2021-08-04 | End: 1900-01-01

## 2021-08-04 RX ORDER — POLYETHYLENE GLYCOL 3350 AND ELECTROLYTES WITH LEMON FLAVOR 236; 22.74; 6.74; 5.86; 2.97 G/4L; G/4L; G/4L; G/4L; G/4L
236 POWDER, FOR SOLUTION ORAL
Qty: 1 | Refills: 0 | Status: ACTIVE | COMMUNITY
Start: 2021-08-04 | End: 1900-01-01

## 2021-08-04 NOTE — ASSESSMENT
[FreeTextEntry1] : Dyspepsia: The patient complains of dyspeptic symptoms.  The patient was advised to abide by an anti-gas diet.  The patient was given a pamphlet for anti-gas.  The patient and I reviewed the anti-gas diet at length. The patient is to start on a trial of Phazyme one tablet 3 times a day p.r.n. abdominal pain and gas.\par GERD: The patient was advised to avoid late-night meals and dietary indiscretions.  The patient was advised to avoid fried and fatty foods.  The patient was advised to abide by an anti-GERD diet. The patient was given a pamphlet for anti-GERD.  The patient and I reviewed the anti-GERD diet at length. I recommend a trial of Pantoprazole 40 mg once a day x 3 months for the symptoms.\par Elevated Liver Enzymes: The patient has elevated liver enzymes noted on prior blood work. I recommend a CBC, SMA 24, amylase, lipase, ESR, TFTs, SHAYLA, rheumatoid factor, celiac sprue panel, IgA, profile for hepatitis A, B, C. ,AFP, alpha 1 anti-trypsin  antibody, ceruloplasmin level, iron, TIBC, ferritin level, AMA, anti smooth muscle antibody. \par Colon Cancer screening: I recommend colonoscopy for colon cancer screening over the age of 45 to assess for colonic polyps. The patient was told of the risks and benefits of the procedure.  The patient was told of the risks of perforation, emergency surgery, bleeding, infections and missed lesions. The patient agreed and will schedule for the procedure. The patient is to be n.p.o. after midnight and bowel prep was given.  The patient is to return for the procedure. \par Colonoscopy: I recommend a colonoscopy to assess the symptoms.  The patient was told of the risks and benefits of the procedure.  The patient was told of the risks of perforation, emergency surgery, bleeding, infections and missed lesions.  The patient agreed and will schedule for the procedure. The patient can take the antihypertensive medication with a sip of water one hour prior to the procedure. The patient is to hold the diabetic medication the day before and the morning of the procedure. The patient is to hold the blood thinner medication for 5 days prior to the procedure. The patient is to be n.p.o. after midnight and bowel prep was given.  The patient is to return for the procedure. \par Upper Endoscopy: I recommend an upper endoscopy to assess for peptic ulcer disease versus esophagitis.  The patient was told of the risks and benefits of the procedure.  The patient was told of the risks of perforation, emergency surgery, bleeding, infections and missed lesions. The patient agreed and will schedule for procedure. The patient is to be n.p.o. after midnight.  The patient is to return for the procedure. \par Follow-up: The patient is to follow-up in the office in 4 weeks to reassess the symptoms. The patient was told to call the office if any further problems. \par

## 2021-08-04 NOTE — REVIEW OF SYSTEMS
[Feeling Tired] : feeling tired [Eyesight Problems] : eyesight problems [Heartburn] : heartburn [Nocturia] : nocturia [Joint Swelling] : joint swelling [Joint Stiffness] : joint stiffness [Anxiety] : anxiety [Depression] : depression [Negative] : Heme/Lymph

## 2021-08-04 NOTE — HISTORY OF PRESENT ILLNESS
[None] : had no significant interval events [Nausea] : denies nausea [Vomiting] : denies vomiting [Diarrhea] : denies diarrhea [Constipation] : denies constipation [Yellow Skin Or Eyes (Jaundice)] : denies jaundice [Abdominal Pain] : denies abdominal pain [Rectal Pain] : denies rectal pain [Heartburn] : heartburn [Abdominal Swelling] : abdominal swelling [GERD] : gastroesophageal reflux disease [Pancreatitis] : pancreatitis [Cholelithiasis] : cholelithiasis [Cholecystectomy] : cholecystectomy [Wt Gain ___ Lbs] : no recent weight gain [Wt Loss ___ Lbs] : no recent weight loss [Hiatus Hernia] : no hiatus hernia [Peptic Ulcer Disease] : no peptic ulcer disease [Kidney Stone] : no kidney stone [Inflammatory Bowel Disease] : no inflammatory bowel disease [Irritable Bowel Syndrome] : no irritable bowel syndrome [Diverticulitis] : no diverticulitis [Alcohol Abuse] : no alcohol abuse [Malignancy] : no malignancy [Abdominal Surgery] : no abdominal surgery [Appendectomy] : no appendectomy [de-identified] : The patient is a 70-year-old  male with past medical history significant for pancreatitis who was referred to my office by Dr. Llanes for history of gallstone pancreatitis, and elevated liver enzymes. The patient also admits to having dyspepsia and gastroesophageal reflux disease. I was asked to render an opinion for consultation for the above complaints.   The patient states that he is feeling weak. The patient denies any prior exposure to hepatitis A, B or C.  The patient denies any large doses of nonsteroidal anti-inflammatory drugs or acetaminophen.   The patient denies sharing needles, razors, nail clippers, nail files, scissors, et cetera.  The patient denies any EtOH abuse, cocaine use or intravenous drug use.   The patient denies any prior blood transfusions, sexual indiscretions, tattoos or piercing.  The patient admits to having prior surgery.  The history of surgery is significant for a prior cholecystectomy.  The patient admits to a family history of GI or liver problems. The patient’s sister had a history of gastritis. The patient was recently hospitalized at the [facility] on July 2, 2021 for abdominal pain.   The patient has a history of pancreatitis.  The patient was hospitalized for 4 days for the symptoms.  The blood work performed on July 3, 2021 revealed an elevated WBC count of 10.86 K/ul, mild anemia with a hemoglobin of 12.9 g/dL, and elevated glucose of 147 mg/dL, and elevated total protein level of 8.4 g/dL, elevated liver enzymes with an alkaline phosphatase/AST/ALT of 171/367/2 46 units/L, respectively, a normal total bilirubin of 0.8 mg/dL, a markedly elevated lipase level of > 30,000, a normal lactate level of 2.0 mmol/L.  The CAT scan of the abdomen and pelvis with IV contrast performed on July 2, 2021 revealed mild peripancreatic stranding again noted suggestive of recurrence of acute pancreatitis with no evidence for pancreatic necrosis.  Also noted was a mildly dilated common bile duct.  The patient was treated with IV hydration and pain management.  The abdominal ultrasound performed on July 3, 2021 revealed suspected acute cholecystitis with cholelithiasis and sludge as well as gallbladder wall thickening.  The findings are suggestive of gallstone pancreatitis with acute cholecystitis.  The patient is status post laparoscopic cholecystectomy with intraoperative cholangiography on July 6, 2021 with Dr. Juan Francisco Llanes.  The patient tolerated the surgery well.  The surgical pathology revealed cholelithiasis with acute and chronic cholecystitis and cholesterolosis.  The last blood work performed on July 7, 2021 revealed mild anemia with a hemoglobin/hematocrit level of 11.6/36.2, respectively, and elevated blood glucose of 134 mg/dL, elevated but improving liver enzymes with an alkaline phosphatase/AST/ALT of 190/81/1 73 units/L, respectively, a normal total bilirubin of 0.4 mg/dL, a low albumin of 3.1 g/dL, the patient was discharged to home and advised to follow-up in the office.  The patient was observed for 4 days with resolution of the symptoms and was discharged to followup in the office.  I reviewed the blood work and imaging studies performed during the hospitalization.  The patient previously complains of abdominal pain prior to surgery.  The patient denies any abdominal pain.  The patient complains of abdominal gas and bloating.  The patient denies any nausea or vomiting.  The patient complains of gastroesophageal reflux disease but denies any dysphagia. The gastroesophageal reflux disease is worse after meals and late at night and in the early morning. The gastroesophageal reflux disease is improved with proton pump inhibitors, H2 blockers and antacids.  The patient denies any atypical chest pain, shortness of breath or palpitations.  The patient denies any diaphoresis. The patient denies any constipation or diarrhea.  The patient has 1 to 2 bowel movements every 1 to 2 days.  The patient denies a change in bowel habits.  The patient denies a change in caliber of stool.  The patient denies having mucus discharge with the bowel movements.  The patient denies any bright red blood per rectum, melena or hematemesis.  The patient complains of rectal pruritus but denies any rectal pain. The patient denies any weight loss or anorexia.  He denies any fevers or chills.  The patient denies any jaundice or pruritus.  The patient complains of occasional lower back pain.  The patient denies ever having a prior upper endoscopy and colonoscopy performed by another gastroenterologist.  The patient admits to a family history of GI problems.  The patient’s sister had a history of gastritis.\par \par  [de-identified] : (-) smoking, (-) ETOH, (-) IVDA\par

## 2021-08-06 ENCOUNTER — NON-APPOINTMENT (OUTPATIENT)
Age: 71
End: 2021-08-06

## 2021-08-06 LAB
AFP-TM SERPL-MCNC: <1.8 NG/ML
ALBUMIN SERPL ELPH-MCNC: 4.3 G/DL
ALP BLD-CCNC: 99 U/L
ALT SERPL-CCNC: 10 U/L
AMYLASE/CREAT SERPL: 63 U/L
ANION GAP SERPL CALC-SCNC: 14 MMOL/L
AST SERPL-CCNC: 18 U/L
BASOPHILS # BLD AUTO: 0.07 K/UL
BASOPHILS NFR BLD AUTO: 0.9 %
BILIRUB SERPL-MCNC: 0.4 MG/DL
BUN SERPL-MCNC: 15 MG/DL
CALCIUM SERPL-MCNC: 9.9 MG/DL
CHLORIDE SERPL-SCNC: 103 MMOL/L
CHOLEST SERPL-MCNC: 196 MG/DL
CO2 SERPL-SCNC: 25 MMOL/L
CREAT SERPL-MCNC: 0.81 MG/DL
EOSINOPHIL # BLD AUTO: 0.21 K/UL
EOSINOPHIL NFR BLD AUTO: 2.8 %
ERYTHROCYTE [SEDIMENTATION RATE] IN BLOOD BY WESTERGREN METHOD: 48 MM/HR
FERRITIN SERPL-MCNC: 65 NG/ML
GGT SERPL-CCNC: 34 U/L
GLIADIN IGA SER QL: 11.1 UNITS
GLIADIN IGG SER QL: <5 UNITS
GLIADIN PEPTIDE IGA SER-ACNC: NEGATIVE
GLIADIN PEPTIDE IGG SER-ACNC: NEGATIVE
GLUCOSE SERPL-MCNC: 91 MG/DL
HBV CORE IGG+IGM SER QL: NONREACTIVE
HBV CORE IGM SER QL: NONREACTIVE
HBV E AB SER QL: NONREACTIVE
HBV E AG SER QL: NONREACTIVE
HBV SURFACE AB SER QL: NONREACTIVE
HBV SURFACE AG SER QL: NONREACTIVE
HCT VFR BLD CALC: 40.8 %
HCV AB SER QL: NONREACTIVE
HCV S/CO RATIO: 0.27 S/CO
HDLC SERPL-MCNC: 62 MG/DL
HEPATITIS A IGG ANTIBODY: REACTIVE
HGB BLD-MCNC: 12.3 G/DL
IGA SER QL IEP: 497 MG/DL
IMM GRANULOCYTES NFR BLD AUTO: 0.3 %
IRON SATN MFR SERPL: 18 %
IRON SERPL-MCNC: 63 UG/DL
LDLC SERPL CALC-MCNC: 104 MG/DL
LPL SERPL-CCNC: 30 U/L
LYMPHOCYTES # BLD AUTO: 2.27 K/UL
LYMPHOCYTES NFR BLD AUTO: 30.3 %
MAN DIFF?: NORMAL
MCHC RBC-ENTMCNC: 28.5 PG
MCHC RBC-ENTMCNC: 30.1 GM/DL
MCV RBC AUTO: 94.7 FL
MITOCHONDRIA AB SER IF-ACNC: NORMAL
MONOCYTES # BLD AUTO: 0.84 K/UL
MONOCYTES NFR BLD AUTO: 11.2 %
NEUTROPHILS # BLD AUTO: 4.09 K/UL
NEUTROPHILS NFR BLD AUTO: 54.5 %
NONHDLC SERPL-MCNC: 135 MG/DL
PLATELET # BLD AUTO: 256 K/UL
POTASSIUM SERPL-SCNC: 5.4 MMOL/L
PROT SERPL-MCNC: 8.2 G/DL
RBC # BLD: 4.31 M/UL
RBC # FLD: 14.6 %
RHEUMATOID FACT SER QL: 19 IU/ML
SMOOTH MUSCLE AB SER QL IF: NORMAL
SODIUM SERPL-SCNC: 141 MMOL/L
TIBC SERPL-MCNC: 350 UG/DL
TRIGL SERPL-MCNC: 154 MG/DL
TTG IGA SER IA-ACNC: 1.4 U/ML
TTG IGA SER-ACNC: NEGATIVE
TTG IGG SER IA-ACNC: 7.6 U/ML
TTG IGG SER IA-ACNC: ABNORMAL
UIBC SERPL-MCNC: 287 UG/DL
WBC # FLD AUTO: 7.5 K/UL

## 2021-08-09 LAB
ANA PAT FLD IF-IMP: ABNORMAL
ANA PATTERN: ABNORMAL
ANA SER IF-ACNC: ABNORMAL
ANA TITER: ABNORMAL

## 2021-11-09 ENCOUNTER — APPOINTMENT (OUTPATIENT)
Dept: GASTROENTEROLOGY | Facility: HOSPITAL | Age: 71
End: 2021-11-09

## 2022-01-26 ENCOUNTER — APPOINTMENT (OUTPATIENT)
Dept: GASTROENTEROLOGY | Facility: CLINIC | Age: 72
End: 2022-01-26

## 2022-04-04 NOTE — PRE-OP CHECKLIST - BSA (M2)
ED Provider Note    Scribed for Linnea Sandra M.D. by Gilbert Sharma. 4/4/2022, 7:20 AM.    Primary care provider: Duc Samuel P.A.-C.  Means of arrival: Wheel Chair  History obtained from: Patient/Daughter  History limited by: None    CHIEF COMPLAINT  Chief Complaint   Patient presents with   • Shortness of Breath     X 3 days       HPI  Linda Casas is a 88 y.o. female who presents to the Emergency Department for acute, worsening shortness of breath onset 3 days ago. She states 3 days ago she began feeling unable to catch her breath.  Pain is worse with lying flat and upon exertion.  Her symptoms have been constant since. Patient denies any chest pain, cough, or fevers.  She is vaccinated against COVID-19.    Per chart review patient recently seen outpatient primary care physician office, she does have a history of hypertension but has not been treated for this as her blood pressure is relatively well controlled when at the office.  She also has recent anxiety related to issues revolving her 's health.  Chart review also demonstrates echocardiogram done 2 years ago with ejection fraction of 60% and grade 2 diastolic dysfunction.    REVIEW OF SYSTEMS  Review of Systems   Constitutional: Negative for fever.   Respiratory: Positive for shortness of breath. Negative for cough.    Cardiovascular: Negative for chest pain.   All other systems reviewed and are negative.    PAST MEDICAL HISTORY   has a past medical history of Anxiety (9/14/2010), Cataract, High cholesterol, Hypertension, OSTEOPOROSIS (9/14/2010), Pain (03/04/14), Pain (11/04/2021), Snoring, and Urinary incontinence.    SURGICAL HISTORY   has a past surgical history that includes hysterectomy, total abdominal (2000); bladder suspension (2001); knee arthroscopy (3/11/2014); regla by laparoscopy (5/23/2016); trigger finger release (Left, 5/29/2018); colon resection; appendectomy (1969); and kyphoplasty (N/A, 11/6/2021).    SOCIAL  "HISTORY  Social History     Tobacco Use   • Smoking status: Never Smoker   • Smokeless tobacco: Never Used   Vaping Use   • Vaping Use: Never used   Substance Use Topics   • Alcohol use: Not Currently     Alcohol/week: 0.0 oz     Comment: 1 time per month   • Drug use: Never      Social History     Substance and Sexual Activity   Drug Use Never       FAMILY HISTORY  Family History   Problem Relation Age of Onset   • No Known Problems Father    • No Known Problems Mother        CURRENT MEDICATIONS  Home Medications     Reviewed by Tiana Montenegro (Pharmacy Tech) on 04/04/22 at 0900  Med List Status: Complete   Medication Last Dose Status   Cholecalciferol (VITAMIN D-3 PO) 4/2/2022 Active   escitalopram (LEXAPRO) 20 MG tablet 4/2/2022 Active   multivitamin (THERAGRAN) Tab 4/2/2022 Active   VITAMIN E PO 4/2/2022 Active                 ALLERGIES  Allergies   Allergen Reactions   • Ampicillin Rash     Feels \"rotten\"        PHYSICAL EXAM  VITAL SIGNS: BP (!) 177/109   Pulse 86   Temp 36.3 °C (97.4 °F) (Temporal)   Resp 16   Ht 1.626 m (5' 4\")   Wt 63.5 kg (140 lb)   SpO2 91%   BMI 24.03 kg/m²   Vitals reviewed by myself.  Physical Exam  Nursing note and vitals reviewed.  Constitutional: Well-developed and well-nourished. No acute distress.   HENT: Head is normocephalic and atraumatic.  Eyes: extra-ocular movements intact  Cardiovascular: Normal rate and regular rhythm. No murmur heard.  Pulmonary/Chest: Breath sounds normal. No wheezes or rales.   Abdominal: Soft and non-tender. No distention.    Musculoskeletal: Extremities exhibit normal range of motion without edema or tenderness.   Neurological: Awake and alert  Skin: Skin is warm and dry. No rash.       DIAGNOSTIC STUDIES  LABS  Labs Reviewed   CBC WITH DIFFERENTIAL - Abnormal; Notable for the following components:       Result Value    WBC 11.7 (*)     MCHC 32.2 (*)     Neutrophils-Polys 75.80 (*)     Lymphocytes 13.20 (*)     Neutrophils (Absolute) 8.85 " (*)     Monos (Absolute) 1.03 (*)     All other components within normal limits   PROBRAIN NATRIURETIC PEPTIDE, NT - Abnormal; Notable for the following components:    NT-proBNP 5372 (*)     All other components within normal limits   VITAMIN B12 - Abnormal; Notable for the following components:    Vitamin B12 -True Cobalamin 209 (*)     All other components within normal limits   COMP METABOLIC PANEL   TROPONIN   ESTIMATED GFR   TSH WITH REFLEX TO FT4   FOLATE   D-DIMER     All labs reviewed by me.    EKG Interpretation:  Interpreted by myself    12 Lead EKG interpreted by me to show:  EKG at 6:06 AM: Normal sinus rhythm with occasional PAC, heart rate 81, left axis deviation, intervals notable for prolonged QRS of 141 with associated left bundle branch block, , QTc 432, no acute ST-T segment changes, no evidence of acute arrhythmia or ischemia, when compared to prior EKG left bundle branch block was present, no dynamic changes when compared to prior EKG  My impression of this EKG: Does not indicate ischemia or arrhythmia at this time.    RADIOLOGY  DX-CHEST-PORTABLE (1 VIEW)   Final Result      Interstitial edema. No focal consolidation or pleural effusions.        The radiologist's interpretation of all radiological studies have been reviewed by me.    REASSESSMENT    7:20 AM - Patient seen and examined at bedside. Discussed plan of care, including ordering labs, imaging, and EKG. Patient agrees to the plan of care.      8:49 AM - Updated patient on results and plan for hospitalization.     9:28 AM - I discussed the patient's case and the above findings with R Internal Medicine Resident who agrees to evaluate the patient for admission.      COURSE & MEDICAL DECISION MAKING  Nursing notes, VS, PMSFHx reviewed in chart.    Patient is a 88-year-old female who presents with shortness of breath.  Differential diagnosis includes arrhythmia, acute coronary syndrome, fluid overload, heart failure, infection.   Diagnostic work-up includes labs, EKG and chest x-ray.    Patient's initial vitals are notable for hypertension, patient reports that she is not medicated for this.  EKG returns and demonstrates left bundle branch block with no evidence of acute arrhythmia or ischemia.  Labs returned and are notable for elevated BNP of 5372 consistent with fluid overload, this may be related to heart failure as prior echocardiogram did demonstrate diastolic dysfunction.  Chest x-ray also demonstrates interstitial edema.  Patient will be started on diuresis with Lasix and hospitalized for ongoing management of fluid overload.  Case is discussed with internal medicine team has accepted patient for hospitalization.  Patient is in guarded condition.    DISPOSITION:  Patient will be admitted by [facility] in guarded condition.      FINAL IMPRESSION  1. Hypervolemia, unspecified hypervolemia type    2. SOB (shortness of breath)    3. Hypertension, unspecified type          I, Gilbert Sharma (Scribe), olga scribing for, and in the presence of, Linnea Sandra M.D..    Electronically signed by: Gilbert Sharma (Didi), 4/4/2022    ILinnea M.D. personally performed the services described in this documentation, as scribed by Gilbert Sharma in my presence, and it is both accurate and complete.     The note accurately reflects work and decisions made by me.  Linnea Sandra M.D.  4/4/2022  3:12 PM     1.67

## 2022-04-23 ENCOUNTER — INPATIENT (INPATIENT)
Facility: HOSPITAL | Age: 72
LOS: 4 days | Discharge: HOME | DRG: 153 | End: 2022-04-28
Attending: INTERNAL MEDICINE | Admitting: INTERNAL MEDICINE
Payer: COMMERCIAL

## 2022-04-23 VITALS
WEIGHT: 128.97 LBS | OXYGEN SATURATION: 98 % | HEIGHT: 63.78 IN | HEART RATE: 74 BPM | TEMPERATURE: 98 F | DIASTOLIC BLOOD PRESSURE: 57 MMHG | SYSTOLIC BLOOD PRESSURE: 100 MMHG | RESPIRATION RATE: 18 BRPM

## 2022-04-23 DIAGNOSIS — R07.9 CHEST PAIN, UNSPECIFIED: ICD-10-CM

## 2022-04-23 DIAGNOSIS — R05.9 COUGH, UNSPECIFIED: ICD-10-CM

## 2022-04-23 DIAGNOSIS — Z78.9 OTHER SPECIFIED HEALTH STATUS: Chronic | ICD-10-CM

## 2022-04-23 DIAGNOSIS — Z90.49 ACQUIRED ABSENCE OF OTHER SPECIFIED PARTS OF DIGESTIVE TRACT: Chronic | ICD-10-CM

## 2022-04-23 DIAGNOSIS — Z29.9 ENCOUNTER FOR PROPHYLACTIC MEASURES, UNSPECIFIED: ICD-10-CM

## 2022-04-23 LAB
ALBUMIN SERPL ELPH-MCNC: 3.3 G/DL — LOW (ref 3.5–5)
ALP SERPL-CCNC: 87 U/L — SIGNIFICANT CHANGE UP (ref 40–120)
ALT FLD-CCNC: 18 U/L DA — SIGNIFICANT CHANGE UP (ref 10–60)
ANION GAP SERPL CALC-SCNC: 5 MMOL/L — SIGNIFICANT CHANGE UP (ref 5–17)
AST SERPL-CCNC: 20 U/L — SIGNIFICANT CHANGE UP (ref 10–40)
BASE EXCESS BLDV CALC-SCNC: 1.2 MMOL/L — SIGNIFICANT CHANGE UP
BASOPHILS # BLD AUTO: 0.06 K/UL — SIGNIFICANT CHANGE UP (ref 0–0.2)
BASOPHILS NFR BLD AUTO: 0.7 % — SIGNIFICANT CHANGE UP (ref 0–2)
BILIRUB SERPL-MCNC: 0.3 MG/DL — SIGNIFICANT CHANGE UP (ref 0.2–1.2)
BUN SERPL-MCNC: 18 MG/DL — SIGNIFICANT CHANGE UP (ref 7–18)
CA-I SERPL-SCNC: SIGNIFICANT CHANGE UP MMOL/L (ref 1.15–1.33)
CALCIUM SERPL-MCNC: 9 MG/DL — SIGNIFICANT CHANGE UP (ref 8.4–10.5)
CHLORIDE SERPL-SCNC: 106 MMOL/L — SIGNIFICANT CHANGE UP (ref 96–108)
CK MB BLD-MCNC: 2.5 % — SIGNIFICANT CHANGE UP (ref 0–3.5)
CK MB CFR SERPL CALC: 2.1 NG/ML — SIGNIFICANT CHANGE UP (ref 0–3.6)
CK SERPL-CCNC: 84 U/L — SIGNIFICANT CHANGE UP (ref 35–232)
CO2 SERPL-SCNC: 27 MMOL/L — SIGNIFICANT CHANGE UP (ref 22–31)
CREAT SERPL-MCNC: 0.74 MG/DL — SIGNIFICANT CHANGE UP (ref 0.5–1.3)
CRP SERPL-MCNC: 18 MG/L — HIGH
D DIMER BLD IA.RAPID-MCNC: <150 NG/ML DDU — SIGNIFICANT CHANGE UP
EGFR: 97 ML/MIN/1.73M2 — SIGNIFICANT CHANGE UP
EOSINOPHIL # BLD AUTO: 0.33 K/UL — SIGNIFICANT CHANGE UP (ref 0–0.5)
EOSINOPHIL NFR BLD AUTO: 4.1 % — SIGNIFICANT CHANGE UP (ref 0–6)
ERYTHROCYTE [SEDIMENTATION RATE] IN BLOOD: 87 MM/HR — HIGH (ref 0–20)
FERRITIN SERPL-MCNC: 89 NG/ML — SIGNIFICANT CHANGE UP (ref 30–400)
GAS PNL BLDV: 135 MMOL/L — LOW (ref 136–145)
GAS PNL BLDV: SIGNIFICANT CHANGE UP
GLUCOSE SERPL-MCNC: 94 MG/DL — SIGNIFICANT CHANGE UP (ref 70–99)
HCO3 BLDV-SCNC: 29 MMOL/L — SIGNIFICANT CHANGE UP (ref 22–29)
HCOV PNL SPEC NAA+PROBE: DETECTED
HCT VFR BLD CALC: 36.7 % — LOW (ref 39–50)
HGB BLD-MCNC: 11.5 G/DL — LOW (ref 13–17)
IMM GRANULOCYTES NFR BLD AUTO: 0.1 % — SIGNIFICANT CHANGE UP (ref 0–1.5)
LACTATE BLDV-MCNC: 1 MMOL/L — SIGNIFICANT CHANGE UP (ref 0.5–2)
LDH SERPL L TO P-CCNC: 189 U/L — SIGNIFICANT CHANGE UP (ref 120–225)
LYMPHOCYTES # BLD AUTO: 1.89 K/UL — SIGNIFICANT CHANGE UP (ref 1–3.3)
LYMPHOCYTES # BLD AUTO: 23.4 % — SIGNIFICANT CHANGE UP (ref 13–44)
MCHC RBC-ENTMCNC: 28.3 PG — SIGNIFICANT CHANGE UP (ref 27–34)
MCHC RBC-ENTMCNC: 31.3 GM/DL — LOW (ref 32–36)
MCV RBC AUTO: 90.4 FL — SIGNIFICANT CHANGE UP (ref 80–100)
MONOCYTES # BLD AUTO: 0.97 K/UL — HIGH (ref 0–0.9)
MONOCYTES NFR BLD AUTO: 12 % — SIGNIFICANT CHANGE UP (ref 2–14)
NEUTROPHILS # BLD AUTO: 4.83 K/UL — SIGNIFICANT CHANGE UP (ref 1.8–7.4)
NEUTROPHILS NFR BLD AUTO: 59.7 % — SIGNIFICANT CHANGE UP (ref 43–77)
NRBC # BLD: 0 /100 WBCS — SIGNIFICANT CHANGE UP (ref 0–0)
NT-PROBNP SERPL-SCNC: 129 PG/ML — HIGH (ref 0–125)
PCO2 BLDV: 57 MMHG — HIGH (ref 42–55)
PH BLDV: 7.31 — LOW (ref 7.32–7.43)
PLATELET # BLD AUTO: 283 K/UL — SIGNIFICANT CHANGE UP (ref 150–400)
PO2 BLDV: 28 MMHG — SIGNIFICANT CHANGE UP
POTASSIUM BLDV-SCNC: 4.4 MMOL/L — SIGNIFICANT CHANGE UP (ref 3.5–5.1)
POTASSIUM SERPL-MCNC: 4.4 MMOL/L — SIGNIFICANT CHANGE UP (ref 3.5–5.3)
POTASSIUM SERPL-SCNC: 4.4 MMOL/L — SIGNIFICANT CHANGE UP (ref 3.5–5.3)
PROT SERPL-MCNC: 8.7 G/DL — HIGH (ref 6–8.3)
RAPID RVP RESULT: DETECTED
RBC # BLD: 4.06 M/UL — LOW (ref 4.2–5.8)
RBC # FLD: 13.4 % — SIGNIFICANT CHANGE UP (ref 10.3–14.5)
SAO2 % BLDV: 45.8 % — SIGNIFICANT CHANGE UP
SARS-COV-2 RNA SPEC QL NAA+PROBE: SIGNIFICANT CHANGE UP
SODIUM SERPL-SCNC: 138 MMOL/L — SIGNIFICANT CHANGE UP (ref 135–145)
TROPONIN I, HIGH SENSITIVITY RESULT: 8.1 NG/L — SIGNIFICANT CHANGE UP
TROPONIN I, HIGH SENSITIVITY RESULT: 8.4 NG/L — SIGNIFICANT CHANGE UP
WBC # BLD: 8.09 K/UL — SIGNIFICANT CHANGE UP (ref 3.8–10.5)
WBC # FLD AUTO: 8.09 K/UL — SIGNIFICANT CHANGE UP (ref 3.8–10.5)

## 2022-04-23 PROCEDURE — 93010 ELECTROCARDIOGRAM REPORT: CPT

## 2022-04-23 PROCEDURE — 99285 EMERGENCY DEPT VISIT HI MDM: CPT

## 2022-04-23 PROCEDURE — 71046 X-RAY EXAM CHEST 2 VIEWS: CPT | Mod: 26

## 2022-04-23 RX ORDER — ACETAMINOPHEN 500 MG
650 TABLET ORAL EVERY 6 HOURS
Refills: 0 | Status: DISCONTINUED | OUTPATIENT
Start: 2022-04-23 | End: 2022-04-28

## 2022-04-23 RX ORDER — ATORVASTATIN CALCIUM 80 MG/1
40 TABLET, FILM COATED ORAL AT BEDTIME
Refills: 0 | Status: DISCONTINUED | OUTPATIENT
Start: 2022-04-23 | End: 2022-04-25

## 2022-04-23 RX ORDER — ASPIRIN/CALCIUM CARB/MAGNESIUM 324 MG
81 TABLET ORAL DAILY
Refills: 0 | Status: DISCONTINUED | OUTPATIENT
Start: 2022-04-23 | End: 2022-04-25

## 2022-04-23 RX ORDER — ENOXAPARIN SODIUM 100 MG/ML
40 INJECTION SUBCUTANEOUS EVERY 24 HOURS
Refills: 0 | Status: DISCONTINUED | OUTPATIENT
Start: 2022-04-23 | End: 2022-04-28

## 2022-04-23 RX ORDER — ASPIRIN/CALCIUM CARB/MAGNESIUM 324 MG
162 TABLET ORAL ONCE
Refills: 0 | Status: COMPLETED | OUTPATIENT
Start: 2022-04-23 | End: 2022-04-23

## 2022-04-23 RX ORDER — AZITHROMYCIN 500 MG/1
500 TABLET, FILM COATED ORAL DAILY
Refills: 0 | Status: DISCONTINUED | OUTPATIENT
Start: 2022-04-24 | End: 2022-04-25

## 2022-04-23 RX ADMIN — Medication 162 MILLIGRAM(S): at 11:26

## 2022-04-23 RX ADMIN — ENOXAPARIN SODIUM 40 MILLIGRAM(S): 100 INJECTION SUBCUTANEOUS at 22:03

## 2022-04-23 RX ADMIN — ATORVASTATIN CALCIUM 40 MILLIGRAM(S): 80 TABLET, FILM COATED ORAL at 22:03

## 2022-04-23 NOTE — ED PROVIDER NOTE - PHYSICAL EXAMINATION
Attending MD John :   PHYSICAL EXAM:    GENERAL: NAD  HEENT:  Atraumatic  CHEST/LUNG: Chest rise equal bilaterally. CTAB.   HEART: Regular rate and rhythm. No murmur or rubs.   ABDOMEN: Soft, Nontender, Nondistended  EXTREMITIES:  Extremities warm  PSYCH: A&Ox3  SKIN: No obvious rashes or lesions

## 2022-04-23 NOTE — ED PROVIDER NOTE - CLINICAL SUMMARY MEDICAL DECISION MAKING FREE TEXT BOX
Attending MD John : Pt here with CP and CHILDERS suspicious for stable angina. doubt PE given lack of tachycardia, lack of hypoxia and lack of signs or sxs for DVT. Will obtain EKG, trop, CXR and reassess.

## 2022-04-23 NOTE — H&P ADULT - ASSESSMENT
71M with PMH gallstone pancreatitis s/p cholecystectomy 7/2021 at [facility], p/w increased sputum production x2 wks, dyspnea and chest pain on exertion admitted for chest pain and productive cough.

## 2022-04-23 NOTE — H&P ADULT - HISTORY OF PRESENT ILLNESS
71M with PMH gallstone pancreatitis s/p cholecystectomy 7/2021 at [facility], p/w increased sputum production x2 wks, dyspnea and chest pain on exertion. Pt states that he developed a productive cough with white sputum about 2 weeks ago associated with ~10lb weight loss (adjusted his belt 1 notch over) and chills. Pt reports dyspnea on exertion with associated left sided chest pain radiating to the left shoulder 4-5/10 after walking 1 block. Denies fever, abdominal pain, nausea/vomiting, diarrhea.     No history of cancer. Pt mother with uterine cancer. No recent travel, no known TB Exposure.

## 2022-04-23 NOTE — H&P ADULT - PROBLEM SELECTOR PLAN 1
p/w chest pain  ACS protocol - asa, statin  Hold bb as pt bp low  EKG shows  Trop 1 x1 neg, f/u T2  Tele Monitoring  f/u Echo  Cardio consulted: Dr. Rust p/w chest pain  ACS protocol - asa, statin  Hold bb as pt bp low  EKG shows  Trop x2 neg  Tele Monitoring  f/u Echo

## 2022-04-23 NOTE — H&P ADULT - NSHPREVIEWOFSYSTEMS_GEN_ALL_CORE
CONSTITUTIONAL: No fever, (+) weight loss, (+) fatigue  RESPIRATORY; (+) cough, (+) shortness of breath  CARDIOVASCULAR: No chest pain, palpitations, dizziness, or leg swelling  GASTROINTESTINAL: No abdominal pain. No nausea, vomiting, or hematemesis; No diarrhea or constipation. No melena or hematochezia.  GENITOURINARY: No dysuria or hematuria, urinary frequency  NEUROLOGICAL: No headaches, memory loss, loss of strength, numbness, or tremors  ENDOCRINE: No polyuria, polydipsia, or heat/cold intolerance  MUSKULOSKELETAL: No muscle aches, joint pains  HEME: no easy bruisability, no tender or enlarged lymph nodes  SKIN: No itching, burning, rashes, or lesions .

## 2022-04-23 NOTE — H&P ADULT - ATTENDING COMMENTS
Seen and examined . Viral infection and work up in progress for weight loss .Will check CT chest and abdomen .

## 2022-04-23 NOTE — H&P ADULT - PROBLEM SELECTOR PLAN 2
p/w productive cough x2 wks - may be acute bronchitis  positive for coronavirus which is not covid as per infection control and does not require isolation  resend covid pcr in 24hrs  send sputum culture  f/u procalcitonin  Send Quantiferon gold. [place] moved [place] 23[place]24yrs ago so TB Suspicion is low. However, pt with productive 2wk cough, weight loss and chills. If quantiferon gold positive, consider sending sputum afb x3.   Tessalon perles  start azithromycin 500 qd p/w productive cough x2 wks - may be acute bronchitis  positive for coronavirus which is not covid as per infection control and does not require isolation  resend covid pcr in 24hrs  send sputum culture  f/u procalcitonin  Send Quantiferon gold. [place] moved [place] 23-24yrs ago so TB Suspicion is low. However, pt with productive 2wk cough, weight loss and chills. If quantiferon gold positive, consider sending sputum afb x3.   Tessalon perles  start azithromycin 500 qd  ct chest ap r/o mass in setting of weight loss

## 2022-04-23 NOTE — ED PROVIDER NOTE - OBJECTIVE STATEMENT
Attending MD John : 71 M no sig PMH (has not seen physician) p/w increasing chest pain radiating to the lef shoulder and CHILDERS x 2 weeks. Gradual onset, diffuse chest pain worst on exertion assoc with SOB that he cannot ambulate as far as he usually does. No leg swelling, leg pain or hx of DVT. No sudden onset symptoms.

## 2022-04-23 NOTE — H&P ADULT - NSHPPHYSICALEXAM_GEN_ALL_CORE
General - NAD, sitting up in bed, well groomed  Eyes - PERRLA, EOM intact  ENT - Nonicteric sclerae, PERRLA, EOMI. Oropharynx clear. Moist mucous membranes. Conjunctivae appear well perfused.   Neck - No noticeable or palpable swelling, redness or rash around throat or on face  Lymph Nodes - No lymphadenopathy  Cardiovascular - RRR no m/r/g, no JVD, no carotid bruits  Lungs - Clear to ascultation, no use of accessory muscles, no crackles or wheezes.  Skin - No rashes, skin warm and dry, no erythematous areas  Abdomen - Normal bowel sounds, abdomen soft and nontender  Rectal – Rectal exam not performed since no symptoms indicated blood loss.  Extremities - No edema, cyanosis or clubbing  Musculoskeletal - 5/5 strength, normal range of motion, no swollen or erythematous joints.  Neuro– Alert and oriented x 3, CN 2-12 grossly intact.

## 2022-04-24 LAB
A1C WITH ESTIMATED AVERAGE GLUCOSE RESULT: 6.1 % — HIGH (ref 4–5.6)
ANION GAP SERPL CALC-SCNC: 3 MMOL/L — LOW (ref 5–17)
BASOPHILS # BLD AUTO: 0.06 K/UL — SIGNIFICANT CHANGE UP (ref 0–0.2)
BASOPHILS NFR BLD AUTO: 1 % — SIGNIFICANT CHANGE UP (ref 0–2)
BUN SERPL-MCNC: 15 MG/DL — SIGNIFICANT CHANGE UP (ref 7–18)
CALCIUM SERPL-MCNC: 9.2 MG/DL — SIGNIFICANT CHANGE UP (ref 8.4–10.5)
CHLORIDE SERPL-SCNC: 107 MMOL/L — SIGNIFICANT CHANGE UP (ref 96–108)
CHOLEST SERPL-MCNC: 133 MG/DL — SIGNIFICANT CHANGE UP
CO2 SERPL-SCNC: 28 MMOL/L — SIGNIFICANT CHANGE UP (ref 22–31)
CREAT SERPL-MCNC: 0.63 MG/DL — SIGNIFICANT CHANGE UP (ref 0.5–1.3)
EGFR: 102 ML/MIN/1.73M2 — SIGNIFICANT CHANGE UP
EOSINOPHIL # BLD AUTO: 0.35 K/UL — SIGNIFICANT CHANGE UP (ref 0–0.5)
EOSINOPHIL NFR BLD AUTO: 5.7 % — SIGNIFICANT CHANGE UP (ref 0–6)
ESTIMATED AVERAGE GLUCOSE: 128 MG/DL — HIGH (ref 68–114)
GLUCOSE SERPL-MCNC: 88 MG/DL — SIGNIFICANT CHANGE UP (ref 70–99)
HCT VFR BLD CALC: 37.4 % — LOW (ref 39–50)
HDLC SERPL-MCNC: 57 MG/DL — SIGNIFICANT CHANGE UP
HGB BLD-MCNC: 11.8 G/DL — LOW (ref 13–17)
IMM GRANULOCYTES NFR BLD AUTO: 0.2 % — SIGNIFICANT CHANGE UP (ref 0–1.5)
LIPID PNL WITH DIRECT LDL SERPL: 59 MG/DL — SIGNIFICANT CHANGE UP
LYMPHOCYTES # BLD AUTO: 2.16 K/UL — SIGNIFICANT CHANGE UP (ref 1–3.3)
LYMPHOCYTES # BLD AUTO: 35 % — SIGNIFICANT CHANGE UP (ref 13–44)
MAGNESIUM SERPL-MCNC: 2.2 MG/DL — SIGNIFICANT CHANGE UP (ref 1.6–2.6)
MCHC RBC-ENTMCNC: 28.8 PG — SIGNIFICANT CHANGE UP (ref 27–34)
MCHC RBC-ENTMCNC: 31.6 GM/DL — LOW (ref 32–36)
MCV RBC AUTO: 91.2 FL — SIGNIFICANT CHANGE UP (ref 80–100)
MONOCYTES # BLD AUTO: 0.93 K/UL — HIGH (ref 0–0.9)
MONOCYTES NFR BLD AUTO: 15.1 % — HIGH (ref 2–14)
NEUTROPHILS # BLD AUTO: 2.66 K/UL — SIGNIFICANT CHANGE UP (ref 1.8–7.4)
NEUTROPHILS NFR BLD AUTO: 43 % — SIGNIFICANT CHANGE UP (ref 43–77)
NON HDL CHOLESTEROL: 76 MG/DL — SIGNIFICANT CHANGE UP
NRBC # BLD: 0 /100 WBCS — SIGNIFICANT CHANGE UP (ref 0–0)
PHOSPHATE SERPL-MCNC: 3.2 MG/DL — SIGNIFICANT CHANGE UP (ref 2.5–4.5)
PLATELET # BLD AUTO: 274 K/UL — SIGNIFICANT CHANGE UP (ref 150–400)
POTASSIUM SERPL-MCNC: 4.1 MMOL/L — SIGNIFICANT CHANGE UP (ref 3.5–5.3)
POTASSIUM SERPL-SCNC: 4.1 MMOL/L — SIGNIFICANT CHANGE UP (ref 3.5–5.3)
PROCALCITONIN SERPL-MCNC: <0.02 NG/ML — SIGNIFICANT CHANGE UP (ref 0.02–0.1)
RBC # BLD: 4.1 M/UL — LOW (ref 4.2–5.8)
RBC # FLD: 13.6 % — SIGNIFICANT CHANGE UP (ref 10.3–14.5)
SARS-COV-2 RNA SPEC QL NAA+PROBE: SIGNIFICANT CHANGE UP
SODIUM SERPL-SCNC: 138 MMOL/L — SIGNIFICANT CHANGE UP (ref 135–145)
TRIGL SERPL-MCNC: 83 MG/DL — SIGNIFICANT CHANGE UP
TSH SERPL-MCNC: 1.33 UU/ML — SIGNIFICANT CHANGE UP (ref 0.34–4.82)
WBC # BLD: 6.17 K/UL — SIGNIFICANT CHANGE UP (ref 3.8–10.5)
WBC # FLD AUTO: 6.17 K/UL — SIGNIFICANT CHANGE UP (ref 3.8–10.5)

## 2022-04-24 PROCEDURE — 93306 TTE W/DOPPLER COMPLETE: CPT | Mod: 26

## 2022-04-24 RX ADMIN — Medication 600 MILLIGRAM(S): at 17:01

## 2022-04-24 RX ADMIN — AZITHROMYCIN 500 MILLIGRAM(S): 500 TABLET, FILM COATED ORAL at 11:34

## 2022-04-24 RX ADMIN — Medication 81 MILLIGRAM(S): at 11:35

## 2022-04-24 RX ADMIN — Medication 600 MILLIGRAM(S): at 11:34

## 2022-04-24 RX ADMIN — Medication 650 MILLIGRAM(S): at 13:24

## 2022-04-24 RX ADMIN — Medication 650 MILLIGRAM(S): at 13:54

## 2022-04-24 RX ADMIN — ATORVASTATIN CALCIUM 40 MILLIGRAM(S): 80 TABLET, FILM COATED ORAL at 21:21

## 2022-04-24 RX ADMIN — Medication 100 MILLIGRAM(S): at 21:21

## 2022-04-24 RX ADMIN — ENOXAPARIN SODIUM 40 MILLIGRAM(S): 100 INJECTION SUBCUTANEOUS at 21:21

## 2022-04-24 NOTE — PATIENT PROFILE ADULT - CAREGIVER RELATION TO PATIENT
April 9, 2021     Patient: Gwendolyn Ruiz   YOB: 1979   Date of Visit: 4/9/2021       To Whom it May Concern:    Gwendolyn Ruiz was seen in my clinic on 4/9/2021 at 8:20 am.    Flare up of Lupus arthritis= OFF WORK from April 5 through April 11.    May return to work April 12.      Sincerely,         Jorge Ledesma MD    Medical information is confidential and cannot be disclosed without the written consent of the patient or her representative.      
wife

## 2022-04-24 NOTE — PROGRESS NOTE ADULT - SUBJECTIVE AND OBJECTIVE BOX
Pt comes to er with c/o abdominal pain on at off since the 31st.  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 Only  Other thing that concerns  Her with the abdominal pain is that she  Has times when  She feels her  Belly button  Has a foul odor even though she washes everyday. When asked if she notices a discharge, pt states that she  Believes it is wet at times and proceeded to check to see if it was  Wet currently. Pt admitted it was currently wet, no odor  Noted from a distance. Pt also c/o  Numbness to her finger tips if she is holding things for long  Periods of time.   This has been going on for years PGY-1 Progress Note discussed with attending    PAGER #: [[phone]] TILL 5:00 PM  PLEASE CONTACT ON CALL TEAM:  - On Call Team (Please refer to Leonela) FROM 5:00 PM - 8:30PM  - Nightfloat Team FROM 8:30 -7:30 AM    INTERVAL HPI/OVERNIGHT EVENTS:   - Pt is complaining of SOB with exertion and pain in his chest that is similar to previous days. Denies all other symptoms.      REVIEW OF SYSTEMS:  as stated above    MEDICATIONS  (STANDING):  aspirin enteric coated 81 milliGRAM(s) Oral daily  atorvastatin 40 milliGRAM(s) Oral at bedtime  azithromycin   Tablet 500 milliGRAM(s) Oral daily  enoxaparin Injectable 40 milliGRAM(s) SubCutaneous every 24 hours  guaiFENesin  milliGRAM(s) Oral every 12 hours    MEDICATIONS  (PRN):  acetaminophen     Tablet .. 650 milliGRAM(s) Oral every 6 hours PRN Temp greater or equal to 38C (100.4F), Moderate Pain (4 - 6)  benzonatate 100 milliGRAM(s) Oral three times a day PRN Cough      Vital Signs Last 24 Hrs  T(C): 36.8 (24 Apr 2022 07:08), Max: 36.9 (24 Apr 2022 04:33)  T(F): 98.3 (24 Apr 2022 07:08), Max: 98.5 (24 Apr 2022 04:33)  HR: 66 (24 Apr 2022 07:08) (61 - 73)  BP: 101/55 (24 Apr 2022 07:08) (91/47 - 105/61)  BP(mean): --  RR: 17 (24 Apr 2022 07:08) (17 - 18)  SpO2: 98% (24 Apr 2022 07:08) (97% - 100%)    PHYSICAL EXAMINATION:  GENERAL: NAD, AAOx3  HEAD: AT/NC  EYES: conjunctiva and sclera clear  NECK: supple, No JVD noted, Normal thyroid  CHEST/LUNG: CTABL; no rales, rhonchi, wheezing, or rubs  HEART: regular rate and rhythm; no murmurs, rubs, or gallops  ABDOMEN: soft, nontender, nondistended; Bowel sounds present  EXTREMITIES:  2+ Peripheral Pulses, No clubbing, cyanosis, or edema  SKIN: warm dry                          11.8   6.17  )-----------( 274      ( 24 Apr 2022 06:15 )             37.4     04-24    138  |  107  |  15  ----------------------------<  88  4.1   |  28  |  0.63    Ca    9.2      24 Apr 2022 06:15  Phos  3.2     04-24  Mg     2.2     04-24    TPro  8.7<H>  /  Alb  3.3<L>  /  TBili  0.3  /  DBili  x   /  AST  20  /  ALT  18  /  AlkPhos  87  04-23    LIVER FUNCTIONS - ( 23 Apr 2022 11:23 )  Alb: 3.3 g/dL / Pro: 8.7 g/dL / ALK PHOS: 87 U/L / ALT: 18 U/L DA / AST: 20 U/L / GGT: x           CARDIAC MARKERS ( 23 Apr 2022 16:23 )  x     / x     / 84 U/L / x     / 2.1 ng/mL        COVID-19 PCR: NotDetec (24 Apr 2022 02:30)  SARS-CoV-2: NotDetec (23 Apr 2022 11:23)      CAPILLARY BLOOD GLUCOSE          RADIOLOGY & ADDITIONAL TESTS:

## 2022-04-24 NOTE — PROGRESS NOTE ADULT - ASSESSMENT
71M with PMH gallstone pancreatitis s/p cholecystectomy 7/2021 at [facility], p/w increased sputum production x2 wks, dyspnea and chest pain on exertion admitted for chest pain and productive cough.

## 2022-04-24 NOTE — PATIENT PROFILE ADULT - FALL HARM RISK - UNIVERSAL INTERVENTIONS
Bed in lowest position, wheels locked, appropriate side rails in place/Call bell, personal items and telephone in reach/Instruct patient to call for assistance before getting out of bed or chair/Non-slip footwear when patient is out of bed/[place] to call system/Physically safe environment - no spills, clutter or unnecessary equipment/Purposeful Proactive Rounding/Room/bathroom lighting operational, light cord in reach

## 2022-04-24 NOTE — CONSULT NOTE ADULT - SUBJECTIVE AND OBJECTIVE BOX
Date of service: 04/24/22    Requesting Physician : Dr. Slade     Reason for Consultation: chest pain     HISTORY OF PRESENT ILLNESS:   71M with PMH gallstone pancreatitis s/p cholecystectomy 7/2021 at [facility] who is being seen for chest pain.  The patient presented to the ED with productive cough, chest pain, and dyspnea for 2 weeks.  His symptoms mostly occur after exertion and he reports generalized fatigue.  He was admitted and rule out for MI.  He was found to have RSV and coronavirus.  ECG demonstrated normal SR with no ischemic changes.  The patient was chest pain free upon evaluation.       PAST MEDICAL & SURGICAL HISTORY:  Gallstone pancreatitis    S/P cholecystectomy  7/2021 laparoscopic            MEDICATIONS:  MEDICATIONS  (STANDING):  aspirin enteric coated 81 milliGRAM(s) Oral daily  atorvastatin 40 milliGRAM(s) Oral at bedtime  azithromycin   Tablet 500 milliGRAM(s) Oral daily  enoxaparin Injectable 40 milliGRAM(s) SubCutaneous every 24 hours  guaiFENesin  milliGRAM(s) Oral every 12 hours      Allergies    No Known Allergies    Intolerances        FAMILY HISTORY:  FH: asthma (Father)    FH: diabetes mellitus (Father)    FH: arthritis (Mother)      Non-contributary for premature coronary disease or sudden cardiac death    SOCIAL HISTORY:    [ x] Non-smoker  [ ] Smoker  [ ] Alcohol      REVIEW OF SYSTEMS:  [x ]chest pain  [ x ]shortness of breath  [  ]palpitations  [  ]syncope  [ ]near syncope [ ]upper extremity weakness   [ ] lower extremity weakness  [  ]diplopia  [  ]altered mental status   [  ]fevers  [ ]chills [ ]nausea  [ ]vomitting  [  ]dysphagia    [ ]abdominal pain  [ ]melena  [ ]BRBPR    [  ]epistaxis  [  ]rash    [ ]lower extremity edema        [x ] All others negative	  [ ] Unable to obtain    PHYSICAL EXAM:  T(C): 36.6 (04-24-22 @ 15:05), Max: 36.9 (04-24-22 @ 04:33)  HR: 71 (04-24-22 @ 15:05) (61 - 71)  BP: 100/58 (04-24-22 @ 15:05) (91/47 - 103/54)  RR: 17 (04-24-22 @ 15:05) (17 - 18)  SpO2: 100% (04-24-22 @ 15:05) (97% - 100%)  Wt(kg): --  I&O's Summary        HEENT:   Normal oral mucosa, PERRL, EOMI	  Lymphatic: No lymphadenopathy , no edema  Cardiovascular: Normal S1 S2, No JVD, No murmurs , Peripheral pulses palpable 2+ bilaterally  Respiratory: Lungs clear to auscultation, normal effort 	  Gastrointestinal:  Soft, Non-tender, + BS	  Skin: No rashes, No ecchymoses, No cyanosis, warm to touch  Musculoskeletal: Normal range of motion, normal strength  Psychiatry:  Mood & affect appropriate      TELEMETRY: SR	    ECG:  SR, PVC's 	  RADIOLOGY:  OTHER:     DIAGNOSTIC TESTING:  [ ] Echocardiogram:  [ ]  Catheterization:  [ ] Stress Test:    	  	  LABS:	 	    CARDIAC MARKERS:  CARDIAC MARKERS ( 23 Apr 2022 16:23 )  x     / x     / 84 U/L / x     / 2.1 ng/mL                              11.8   6.17  )-----------( 274      ( 24 Apr 2022 06:15 )             37.4     04-24    138  |  107  |  15  ----------------------------<  88  4.1   |  28  |  0.63    Ca    9.2      24 Apr 2022 06:15  Phos  3.2     04-24  Mg     2.2     04-24    TPro  8.7<H>  /  Alb  3.3<L>  /  TBili  0.3  /  DBili  x   /  AST  20  /  ALT  18  /  AlkPhos  87  04-23    proBNP:   Lipid Profile:   HgA1c:   TSH: Thyroid Stimulating Hormone, Serum: 1.33 uU/mL (04-24 @ 06:15)      ASSESSMENT/PLAN: 71M with [facility] gallstone pancreatitis s/p cholecystectomy 7/2021 at [facility] who is being seen for chest pain.    -pt. currently chest pain free  -MI ruled out  -check TTE  -treatment of RSV per primary team   -patient and family prefer non-invasive testing first - check NST when infectious issues resolved    Evie Coats MD

## 2022-04-25 ENCOUNTER — TRANSCRIPTION ENCOUNTER (OUTPATIENT)
Age: 72
End: 2022-04-25

## 2022-04-25 LAB
ANION GAP SERPL CALC-SCNC: 6 MMOL/L — SIGNIFICANT CHANGE UP (ref 5–17)
BASOPHILS # BLD AUTO: 0.05 K/UL — SIGNIFICANT CHANGE UP (ref 0–0.2)
BASOPHILS NFR BLD AUTO: 0.8 % — SIGNIFICANT CHANGE UP (ref 0–2)
BUN SERPL-MCNC: 19 MG/DL — HIGH (ref 7–18)
CALCIUM SERPL-MCNC: 9.4 MG/DL — SIGNIFICANT CHANGE UP (ref 8.4–10.5)
CHLORIDE SERPL-SCNC: 106 MMOL/L — SIGNIFICANT CHANGE UP (ref 96–108)
CO2 SERPL-SCNC: 28 MMOL/L — SIGNIFICANT CHANGE UP (ref 22–31)
CREAT SERPL-MCNC: 0.7 MG/DL — SIGNIFICANT CHANGE UP (ref 0.5–1.3)
EGFR: 99 ML/MIN/1.73M2 — SIGNIFICANT CHANGE UP
EOSINOPHIL # BLD AUTO: 0.31 K/UL — SIGNIFICANT CHANGE UP (ref 0–0.5)
EOSINOPHIL NFR BLD AUTO: 4.9 % — SIGNIFICANT CHANGE UP (ref 0–6)
GLUCOSE BLDC GLUCOMTR-MCNC: 90 MG/DL — SIGNIFICANT CHANGE UP (ref 70–99)
GLUCOSE SERPL-MCNC: 94 MG/DL — SIGNIFICANT CHANGE UP (ref 70–99)
GRAM STN FLD: SIGNIFICANT CHANGE UP
HCT VFR BLD CALC: 39.4 % — SIGNIFICANT CHANGE UP (ref 39–50)
HGB BLD-MCNC: 12.4 G/DL — LOW (ref 13–17)
IMM GRANULOCYTES NFR BLD AUTO: 0.2 % — SIGNIFICANT CHANGE UP (ref 0–1.5)
LYMPHOCYTES # BLD AUTO: 2.17 K/UL — SIGNIFICANT CHANGE UP (ref 1–3.3)
LYMPHOCYTES # BLD AUTO: 34.3 % — SIGNIFICANT CHANGE UP (ref 13–44)
MCHC RBC-ENTMCNC: 28.6 PG — SIGNIFICANT CHANGE UP (ref 27–34)
MCHC RBC-ENTMCNC: 31.5 GM/DL — LOW (ref 32–36)
MCV RBC AUTO: 90.8 FL — SIGNIFICANT CHANGE UP (ref 80–100)
MONOCYTES # BLD AUTO: 0.86 K/UL — SIGNIFICANT CHANGE UP (ref 0–0.9)
MONOCYTES NFR BLD AUTO: 13.6 % — SIGNIFICANT CHANGE UP (ref 2–14)
NEUTROPHILS # BLD AUTO: 2.93 K/UL — SIGNIFICANT CHANGE UP (ref 1.8–7.4)
NEUTROPHILS NFR BLD AUTO: 46.2 % — SIGNIFICANT CHANGE UP (ref 43–77)
NRBC # BLD: 0 /100 WBCS — SIGNIFICANT CHANGE UP (ref 0–0)
PLATELET # BLD AUTO: 276 K/UL — SIGNIFICANT CHANGE UP (ref 150–400)
POTASSIUM SERPL-MCNC: 4.1 MMOL/L — SIGNIFICANT CHANGE UP (ref 3.5–5.3)
POTASSIUM SERPL-SCNC: 4.1 MMOL/L — SIGNIFICANT CHANGE UP (ref 3.5–5.3)
RBC # BLD: 4.34 M/UL — SIGNIFICANT CHANGE UP (ref 4.2–5.8)
RBC # FLD: 13.5 % — SIGNIFICANT CHANGE UP (ref 10.3–14.5)
SODIUM SERPL-SCNC: 140 MMOL/L — SIGNIFICANT CHANGE UP (ref 135–145)
SPECIMEN SOURCE: SIGNIFICANT CHANGE UP
WBC # BLD: 6.33 K/UL — SIGNIFICANT CHANGE UP (ref 3.8–10.5)
WBC # FLD AUTO: 6.33 K/UL — SIGNIFICANT CHANGE UP (ref 3.8–10.5)

## 2022-04-25 RX ORDER — ACETAMINOPHEN 500 MG
650 TABLET ORAL ONCE
Refills: 0 | Status: DISCONTINUED | OUTPATIENT
Start: 2022-04-25 | End: 2022-04-25

## 2022-04-25 RX ORDER — SIMETHICONE 80 MG/1
80 TABLET, CHEWABLE ORAL DAILY
Refills: 0 | Status: DISCONTINUED | OUTPATIENT
Start: 2022-04-25 | End: 2022-04-28

## 2022-04-25 RX ADMIN — Medication 600 MILLIGRAM(S): at 05:42

## 2022-04-25 RX ADMIN — ENOXAPARIN SODIUM 40 MILLIGRAM(S): 100 INJECTION SUBCUTANEOUS at 21:09

## 2022-04-25 RX ADMIN — SIMETHICONE 80 MILLIGRAM(S): 80 TABLET, CHEWABLE ORAL at 18:46

## 2022-04-25 RX ADMIN — Medication 650 MILLIGRAM(S): at 09:16

## 2022-04-25 RX ADMIN — Medication 650 MILLIGRAM(S): at 08:15

## 2022-04-25 RX ADMIN — Medication 600 MILLIGRAM(S): at 17:04

## 2022-04-25 NOTE — PROGRESS NOTE ADULT - SUBJECTIVE AND OBJECTIVE BOX
C A R D I O L O G Y  **********************************     DATE OF SERVICE: 04-25-22    Patient denies shortness of breath still with Chest pains at times .   Review of systems otherwise (-)  	  MEDICATIONS:  MEDICATIONS  (STANDING):  enoxaparin Injectable 40 milliGRAM(s) SubCutaneous every 24 hours  guaiFENesin  milliGRAM(s) Oral every 12 hours      LABS:	 	    CARDIAC MARKERS:  CARDIAC MARKERS ( 23 Apr 2022 16:23 )  x     / x     / 84 U/L / x     / 2.1 ng/mL        Troponin I, High Sensitivity Result: 8.4 ng/L (04-23-22 @ 16:23)                              12.4   6.33  )-----------( 276      ( 25 Apr 2022 05:40 )             39.4     Hemoglobin: 12.4 g/dL (04-25 @ 05:40)  Hemoglobin: 11.8 g/dL (04-24 @ 06:15)  Hemoglobin: 11.5 g/dL (04-23 @ 11:23)      04-25    140  |  106  |  19<H>  ----------------------------<  94  4.1   |  28  |  0.70    Ca    9.4      25 Apr 2022 05:40  Phos  3.2     04-24  Mg     2.2     04-24      Creatinine Trend: 0.70<--, 0.63<--, 0.74<--      PHYSICAL EXAM:  T(C): 36.1 (04-25-22 @ 11:12), Max: 36.7 (04-25-22 @ 04:36)  HR: 70 (04-25-22 @ 11:12) (66 - 96)  BP: 92/47 (04-25-22 @ 11:12) (92/47 - 116/58)  RR: 18 (04-25-22 @ 11:12) (17 - 18)  SpO2: 98% (04-25-22 @ 11:12) (97% - 100%)  Wt(kg): --  I&O's Summary      HEENT:  (-)icterus (-)pallor  CV: N S1 S2 1/6 RISHI (+)2 Pulses B/l  Resp:  Clear to ausculatation B/L, normal effort  GI: (+) BS Soft, NT, ND  Lymph:  (-)Edema, (-)obvious lymphadenopathy  Skin: Warm to touch, Normal turgor  Psych: Appropriate mood and affect      TELEMETRY: 	  Sinus        ASSESSMENT/PLAN: 	71y  Male with PMH gallstone pancreatitis [facility].    - pt. currently chest pain free  - MI ruled out  - Echo with no pertinent findings   - treatment of RSV per primary team   - patient and family prefer non-invasive testing first - check NST  in [place]    Jeevan Arrington MD, [facility]  BEEPER [phone]

## 2022-04-25 NOTE — PROGRESS NOTE ADULT - SUBJECTIVE AND OBJECTIVE BOX
PGY-1 Progress Note discussed with attending    PAGER #: [[phone]] TILL 5:00 PM  PLEASE CONTACT ON CALL TEAM:  - On Call Team (Please refer to Leonela) FROM 5:00 PM - 8:30PM  - Nightfloat Team FROM 8:30 -7:30 AM    CHIEF COMPLAINT & BRIEF HOSPITAL COURSE: HPI:  71M with PMH gallstone pancreatitis s/p cholecystectomy 7/2021 at [facility], p/w increased sputum production x2 wks, dyspnea and chest pain on exertion. Pt states that he developed a productive cough with white sputum about 2 weeks ago associated with ~10lb weight loss (adjusted his belt 1 notch over) and chills. Pt reports dyspnea on exertion with associated left sided chest pain radiating to the left shoulder 4-5/10 after walking 1 block. Denies fever, abdominal pain, nausea/vomiting, diarrhea.     No history of cancer. Pt mother with uterine cancer. No recent travel, no known TB Exposure. (23 Apr 2022 16:53)      INTERVAL HPI/OVERNIGHT EVENTS: Patient was examined while he was lying in bed, Aox3, responding appropriately to questions, in NAD. He has normal bowel and bladder movements, and denies any other acute complaints. Pt denied any chest pain, shortness of breath, nausea, vomiting or abdominal pain.       REVIEW OF SYSTEMS:  CONSTITUTIONAL: No fever, weight loss, or fatigue  RESPIRATORY: No cough, wheezing, chills or hemoptysis; No shortness of breath  CARDIOVASCULAR: No chest pain, palpitations, dizziness, or leg swelling  GASTROINTESTINAL: No abdominal pain. No nausea, vomiting, or hematemesis; No diarrhea or constipation. No melena or hematochezia.  GENITOURINARY: No dysuria or hematuria, urinary frequency  NEUROLOGICAL: No headaches, memory loss, loss of strength, numbness, or tremors  SKIN: No itching, burning, rashes, or lesions     Vital Signs Last 24 Hrs  T(C): 36.1 (25 Apr 2022 11:12), Max: 36.7 (25 Apr 2022 04:36)  T(F): 97 (25 Apr 2022 11:12), Max: 98 (25 Apr 2022 04:36)  HR: 70 (25 Apr 2022 11:12) (66 - 96)  BP: 92/47 (25 Apr 2022 11:12) (92/47 - 116/58)  BP(mean): --  RR: 18 (25 Apr 2022 11:12) (17 - 18)  SpO2: 98% (25 Apr 2022 11:12) (97% - 100%)    MEDICATIONS  (STANDING):  enoxaparin Injectable 40 milliGRAM(s) SubCutaneous every 24 hours  guaiFENesin  milliGRAM(s) Oral every 12 hours    MEDICATIONS  (PRN):  acetaminophen     Tablet .. 650 milliGRAM(s) Oral every 6 hours PRN Temp greater or equal to 38C (100.4F), Moderate Pain (4 - 6)  benzonatate 100 milliGRAM(s) Oral three times a day PRN Cough      PHYSICAL EXAMINATION:  GENERAL: NAD, well built  HEAD:  Atraumatic, Normocephalic  EYES:  conjunctiva and sclera clear  NECK: Supple, No JVD,   CHEST/LUNG: Clear to auscultation. No rales, rhonchi, wheezing, or rubs  HEART: Regular rate and rhythm; No murmurs, rubs, or gallops  ABDOMEN: Soft, Nontender, Nondistended; Bowel sounds present  NERVOUS SYSTEM:  Alert & Oriented X3,    EXTREMITIES:  2+ Peripheral Pulses, No clubbing, cyanosis, or edema  SKIN: warm dry                          12.4   6.33  )-----------( 276      ( 25 Apr 2022 05:40 )             39.4     04-25    140  |  106  |  19<H>  ----------------------------<  94  4.1   |  28  |  0.70    Ca    9.4      25 Apr 2022 05:40  Phos  3.2     04-24  Mg     2.2     04-24        CARDIAC MARKERS ( 23 Apr 2022 16:23 )  x     / x     / 84 U/L / x     / 2.1 ng/mL          CAPILLARY BLOOD GLUCOSE      RADIOLOGY & ADDITIONAL TESTS:

## 2022-04-25 NOTE — DISCHARGE NOTE NURSING/CASE MANAGEMENT/SOCIAL WORK - PATIENT PORTAL LINK FT
You can access the FollowMyHealth Patient Portal offered by [facility] by registering at the following website: [email or web address]. By joining [facility]’s FollowMyHealth portal, you will also be able to view your health information using other applications (apps) compatible with our system.

## 2022-04-25 NOTE — DISCHARGE NOTE NURSING/CASE MANAGEMENT/SOCIAL WORK - NSDCPEFALRISK_GEN_ALL_CORE
For information on Fall & Injury Prevention, visit: [email or web address] OR  [email or web address] OR  https://www.cdc.gov/steadi/patient.html

## 2022-04-25 NOTE — PROGRESS NOTE ADULT - ASSESSMENT
71M with PMH gallstone pancreatitis s/p cholecystectomy 7/2021 at [facility], p/w increased sputum production x2 wks, dyspnea and chest pain on exertion admitted for chest pain and productive cough.

## 2022-04-25 NOTE — DISCHARGE NOTE NURSING/CASE MANAGEMENT/SOCIAL WORK - NSSCNAMETXT_GEN_ALL_CORE
[facility] (formerly [facility]) [phone]

## 2022-04-26 LAB
ANION GAP SERPL CALC-SCNC: 5 MMOL/L — SIGNIFICANT CHANGE UP (ref 5–17)
BUN SERPL-MCNC: 16 MG/DL — SIGNIFICANT CHANGE UP (ref 7–18)
CALCIUM SERPL-MCNC: 9.2 MG/DL — SIGNIFICANT CHANGE UP (ref 8.4–10.5)
CHLORIDE SERPL-SCNC: 105 MMOL/L — SIGNIFICANT CHANGE UP (ref 96–108)
CO2 SERPL-SCNC: 28 MMOL/L — SIGNIFICANT CHANGE UP (ref 22–31)
CREAT SERPL-MCNC: 0.66 MG/DL — SIGNIFICANT CHANGE UP (ref 0.5–1.3)
CRP SERPL-MCNC: 4 MG/L — SIGNIFICANT CHANGE UP
EGFR: 101 ML/MIN/1.73M2 — SIGNIFICANT CHANGE UP
ERYTHROCYTE [SEDIMENTATION RATE] IN BLOOD: 58 MM/HR — HIGH (ref 0–20)
GAMMA INTERFERON BACKGROUND BLD IA-ACNC: 1.67 IU/ML — SIGNIFICANT CHANGE UP
GLUCOSE BLDC GLUCOMTR-MCNC: 105 MG/DL — HIGH (ref 70–99)
GLUCOSE SERPL-MCNC: 86 MG/DL — SIGNIFICANT CHANGE UP (ref 70–99)
HCT VFR BLD CALC: 39.7 % — SIGNIFICANT CHANGE UP (ref 39–50)
HGB BLD-MCNC: 12.6 G/DL — LOW (ref 13–17)
M TB IFN-G BLD-IMP: POSITIVE
M TB IFN-G CD4+ BCKGRND COR BLD-ACNC: 0.77 IU/ML — SIGNIFICANT CHANGE UP
M TB IFN-G CD4+CD8+ BCKGRND COR BLD-ACNC: 0.44 IU/ML — SIGNIFICANT CHANGE UP
MCHC RBC-ENTMCNC: 28.5 PG — SIGNIFICANT CHANGE UP (ref 27–34)
MCHC RBC-ENTMCNC: 31.7 GM/DL — LOW (ref 32–36)
MCV RBC AUTO: 89.8 FL — SIGNIFICANT CHANGE UP (ref 80–100)
NRBC # BLD: 0 /100 WBCS — SIGNIFICANT CHANGE UP (ref 0–0)
PLATELET # BLD AUTO: 284 K/UL — SIGNIFICANT CHANGE UP (ref 150–400)
POTASSIUM SERPL-MCNC: 3.9 MMOL/L — SIGNIFICANT CHANGE UP (ref 3.5–5.3)
POTASSIUM SERPL-SCNC: 3.9 MMOL/L — SIGNIFICANT CHANGE UP (ref 3.5–5.3)
QUANT TB PLUS MITOGEN MINUS NIL: 8.33 IU/ML — SIGNIFICANT CHANGE UP
RBC # BLD: 4.42 M/UL — SIGNIFICANT CHANGE UP (ref 4.2–5.8)
RBC # FLD: 13.4 % — SIGNIFICANT CHANGE UP (ref 10.3–14.5)
SODIUM SERPL-SCNC: 138 MMOL/L — SIGNIFICANT CHANGE UP (ref 135–145)
WBC # BLD: 7.02 K/UL — SIGNIFICANT CHANGE UP (ref 3.8–10.5)
WBC # FLD AUTO: 7.02 K/UL — SIGNIFICANT CHANGE UP (ref 3.8–10.5)

## 2022-04-26 RX ADMIN — SIMETHICONE 80 MILLIGRAM(S): 80 TABLET, CHEWABLE ORAL at 12:36

## 2022-04-26 RX ADMIN — Medication 600 MILLIGRAM(S): at 05:40

## 2022-04-26 RX ADMIN — Medication 600 MILLIGRAM(S): at 17:28

## 2022-04-26 RX ADMIN — ENOXAPARIN SODIUM 40 MILLIGRAM(S): 100 INJECTION SUBCUTANEOUS at 22:54

## 2022-04-26 NOTE — PROGRESS NOTE ADULT - ASSESSMENT
71M with PMH gallstone pancreatitis s/p cholecystectomy 7/2021 at [facility], p/w increased sputum production x2 wks, dyspnea and chest pain on exertion admitted for chest pain and productive cough.

## 2022-04-26 NOTE — CONSULT NOTE ADULT - ASSESSMENT
Viral infection ( Coronavirus infection, not COVID - 19 )  Pulmonary Fibrosis vs Tuberculosis ( given weight loss, + quantiferon Gold test and bilat infiltrates on CXR, seems unlikely to be TB though)    Plan - R/O TB with sputum for AFB x 3   no need for any antibiotics at this time  put on airborne isolation until we have sputum results.

## 2022-04-26 NOTE — PROGRESS NOTE ADULT - SUBJECTIVE AND OBJECTIVE BOX
C A R D I O L O G Y  **********************************     DATE OF SERVICE: 04-26-22    Patient denies chest pain or shortness of breath.   Review of symptoms otherwise negative.    acetaminophen     Tablet .. 650 milliGRAM(s) Oral every 6 hours PRN  benzonatate 100 milliGRAM(s) Oral three times a day PRN  enoxaparin Injectable 40 milliGRAM(s) SubCutaneous every 24 hours  guaiFENesin  milliGRAM(s) Oral every 12 hours  simethicone 80 milliGRAM(s) Chew daily                            12.6   7.02  )-----------( 284      ( 26 Apr 2022 05:50 )             39.7       Hemoglobin: 12.6 g/dL (04-26 @ 05:50)  Hemoglobin: 12.4 g/dL (04-25 @ 05:40)  Hemoglobin: 11.8 g/dL (04-24 @ 06:15)  Hemoglobin: 11.5 g/dL (04-23 @ 11:23)      04-26    138  |  105  |  16  ----------------------------<  86  3.9   |  28  |  0.66    Ca    9.2      26 Apr 2022 05:50      Creatinine Trend: 0.66<--, 0.70<--, 0.63<--, 0.74<--    COAGS:     CARDIAC MARKERS ( 23 Apr 2022 16:23 )  x     / x     / 84 U/L / x     / 2.1 ng/mL        T(C): 36.4 (04-26-22 @ 11:46), Max: 36.8 (04-25-22 @ 16:01)  HR: 70 (04-26-22 @ 11:46) (66 - 78)  BP: 104/49 (04-26-22 @ 11:46) (100/35 - 118/62)  RR: 18 (04-26-22 @ 11:46) (17 - 18)  SpO2: 98% (04-26-22 @ 11:46) (97% - 99%)  Wt(kg): --    I&O's Summary    25 Apr 2022 07:01  -  26 Apr 2022 07:00  --------------------------------------------------------  IN: 50 mL / OUT: 0 mL / NET: 50 mL        HEENT:  (-)icterus (-)pallor  CV: N S1 S2 1/6 RISHI (+)2 Pulses B/l  Resp:  Clear to ausculatation B/L, normal effort  GI: (+) BS Soft, NT, ND  Lymph:  (-)Edema, (-)obvious lymphadenopathy  Skin: Warm to touch, Normal turgor  Psych: Appropriate mood and affect      TELEMETRY: 	  Sinus        ASSESSMENT/PLAN: 	71y  Male with PMH gallstone pancreatitis s/p cholecystectomy 7/2021 at [facility] who is being seen for chest pain.    - pt. currently chest pain free  - MI ruled out  - Echo with no pertinent findings   - treatment of RSV per primary team   - Stress with no ischemai or infarct  - Can D/C tele  - No need for further inpatient cardiac work up.    Jeevan Arrington MD, [facility]  BEEPER [phone]

## 2022-04-26 NOTE — PROGRESS NOTE ADULT - SUBJECTIVE AND OBJECTIVE BOX
PGY-1 Progress Note discussed with attending    PAGER #: [[phone]] TILL 5:00 PM  PLEASE CONTACT ON CALL TEAM:  - On Call Team (Please refer to Leonela) FROM 5:00 PM - 8:30PM  - Nightfloat Team FROM 8:30 -7:30 AM    CHIEF COMPLAINT & BRIEF HOSPITAL COURSE:    INTERVAL HPI/OVERNIGHT EVENTS:       REVIEW OF SYSTEMS:  CONSTITUTIONAL: No fever, weight loss, or fatigue  RESPIRATORY: No cough, wheezing, chills or hemoptysis; No shortness of breath  CARDIOVASCULAR: No chest pain, palpitations, dizziness, or leg swelling  GASTROINTESTINAL: No abdominal pain. No nausea, vomiting, or hematemesis; No diarrhea or constipation. No melena or hematochezia.  GENITOURINARY: No dysuria or hematuria, urinary frequency  NEUROLOGICAL: No headaches, memory loss, loss of strength, numbness, or tremors  SKIN: No itching, burning, rashes, or lesions     Vital Signs Last 24 Hrs  T(C): 36.4 (26 Apr 2022 11:46), Max: 36.8 (25 Apr 2022 16:01)  T(F): 97.6 (26 Apr 2022 11:46), Max: 98.3 (25 Apr 2022 16:01)  HR: 70 (26 Apr 2022 11:46) (66 - 78)  BP: 104/49 (26 Apr 2022 11:46) (100/35 - 118/62)  BP(mean): 67 (26 Apr 2022 11:46) (67 - 75)  RR: 18 (26 Apr 2022 11:46) (17 - 18)  SpO2: 98% (26 Apr 2022 11:46) (97% - 99%)    PHYSICAL EXAMINATION:  GENERAL: NAD, well built  HEAD:  Atraumatic, Normocephalic  EYES:  conjunctiva and sclera clear  NECK: Supple, No JVD, Normal thyroid  CHEST/LUNG: Clear to auscultation. Clear to percussion bilaterally; No rales, rhonchi, wheezing, or rubs  HEART: Regular rate and rhythm; No murmurs, rubs, or gallops  ABDOMEN: Soft, Nontender, Nondistended; Bowel sounds present  NERVOUS SYSTEM:  Alert & Oriented X3,    EXTREMITIES:  2+ Peripheral Pulses, No clubbing, cyanosis, or edema  SKIN: warm dry                          12.6   7.02  )-----------( 284      ( 26 Apr 2022 05:50 )             39.7     04-26    138  |  105  |  16  ----------------------------<  86  3.9   |  28  |  0.66    Ca    9.2      26 Apr 2022 05:50                CAPILLARY BLOOD GLUCOSE      RADIOLOGY & ADDITIONAL TESTS:

## 2022-04-26 NOTE — CONSULT NOTE ADULT - SUBJECTIVE AND OBJECTIVE BOX
HPI:  71M with PMH gallstone pancreatitis s/p cholecystectomy 7/2021 at [facility], p/w increased sputum production x2 wks, dyspnea and chest pain on exertion. Pt states that he developed a productive cough with white sputum about 2 weeks ago associated with ~10lb weight loss (adjusted his belt 1 notch over) and chills. Pt reports dyspnea on exertion with associated left sided chest pain radiating to the left shoulder 4-5/10 after walking 1 block. Denies fever, abdominal pain, nausea/vomiting, diarrhea.     No history of cancer. Pt mother with uterine cancer. No recent travel, no known TB Exposure. (23 Apr 2022 16:53)      PAST MEDICAL & SURGICAL HISTORY:  Gallstone pancreatitis    S/P cholecystectomy  7/2021 laparoscopic        No Known Allergies      Meds:  acetaminophen     Tablet .. 650 milliGRAM(s) Oral every 6 hours PRN  benzonatate 100 milliGRAM(s) Oral three times a day PRN  enoxaparin Injectable 40 milliGRAM(s) SubCutaneous every 24 hours  guaiFENesin  milliGRAM(s) Oral every 12 hours  simethicone 80 milliGRAM(s) Chew daily      SOCIAL HISTORY:  Smoker:  YES / NO        PACK YEARS:                         WHEN QUIT?  ETOH use:  YES / NO               FREQUENCY / QUANTITY:  Ilicit Drug use:  YES / NO  Occupation:  Assisted device use (Cane / Walker):  Live with:    FAMILY HISTORY:  FH: asthma (Father)    FH: diabetes mellitus (Father)    FH: arthritis (Mother)        VITALS:  Vital Signs Last 24 Hrs  T(C): 36.4 (26 Apr 2022 19:34), Max: 36.8 (25 Apr 2022 20:19)  T(F): 97.6 (26 Apr 2022 19:34), Max: 98.2 (25 Apr 2022 20:19)  HR: 68 (26 Apr 2022 19:34) (62 - 78)  BP: 98/66 (26 Apr 2022 19:34) (98/66 - 110/58)  BP(mean): 67 (26 Apr 2022 11:46) (67 - 75)  RR: 18 (26 Apr 2022 19:34) (17 - 18)  SpO2: 97% (26 Apr 2022 19:34) (97% - 99%)    LABS/DIAGNOSTIC TESTS:               Respiratory Viral Panel with COVID-19 by SMITH (04.23.22 @ 11:23)   Rapid RVP Result: Detected   SARS-CoV-2: [facility]: This Respiratory Panel uses polymerase chain reaction (PCR) to detect for   adenovirus; coronavirus (HKU1, NL63, 229E, OC43); human metapneumovirus   (hMPV); human enterovirus/rhinovirus (Entero/RV); influenza A; influenza   A/H1; influenza A/H3; influenza A/H1-2009; influenza B; parainfluenza   viruses 1, 2, 3, 4; respiratory syncytial virus; Mycoplasma pneumoniae;   Chlamydophila pneumoniae; and SARS-CoV-2.         Coronavirus (229E,HKU1,NL63,OC43): Detected            12.6   7.02  )-----------( 284      ( 26 Apr 2022 05:50 )             39.7     WBC Count: 7.02 K/uL (04-26 @ 05:50)  WBC Count: 6.33 K/uL (04-25 @ 05:40)  WBC Count: 6.17 K/uL (04-24 @ 06:15)      04-26    138  |  105  |  16  ----------------------------<  86  3.9   |  28  |  0.66    Ca    9.2      26 Apr 2022 05:50                    LACTATE:    ABG -     CULTURES:   .Sputum Sputum  04-25 @ 12:16   Normal Respiratory Gris present  --    Moderate polymorphonuclear leukocytes per low power field  Moderate Squamous epithelial cells per low power field  Numerous Gram positive cocci in pairs per oil power field  Moderate Gram Variable Rods per oil power field  Results consistent with oropharyngeal contamination          RADIOLOGY:< from: Xray Chest 2 Views PA/Lat (04.23.22 @ 12:02) >  ACC: [id number] EXAM:  XR CHEST PA LAT 2V                          PROCEDURE DATE:  04/23/2022          INTERPRETATION:  HISTORY:  Chest Pain; ; ; ;  TECHNIQUE: Two views of the chest, PA and lateral.  COMPARISON: NONE.  FINDINGS:    HEART: Normal insize  LUNGS: Bilateral prominent interstitial markings likely representing   underlying fibrosis. Biapical pleural thickening is seen. Right   hemidiaphragm is mildly elevated. No pneumothorax. No effusion  OSSEOUS STRUCTURES: Within normal limits.  Cholecystectomy clips  IMPRESSION:  Pulmonary fibrosis.    --- End of Report ---            CARLO BARRETT MD; Attending Interventional Radiologist  This document has been electronically signed. Apr 24 2022  9:23AM    < end of copied text >        ROS  [  ] UNABLE TO ELICIT               HPI:  71M with PMH gallstone pancreatitis s/p cholecystectomy 7/2021 at [facility], p/w increased sputum production x2 wks, dyspnea and chest pain on exertion. Pt states that he developed a productive cough with white sputum about 2 weeks ago associated with ~10lb weight loss (adjusted his belt 1 notch over) and chills. Pt reports dyspnea on exertion with associated left sided chest pain radiating to the left shoulder 4-5/10 after walking 1 block. Denies fever, abdominal pain, nausea/vomiting, diarrhea.     No history of cancer. Pt mother with uterine cancer. No recent travel, no known TB Exposure. (23 Apr 2022 16:53)      PAST MEDICAL & SURGICAL HISTORY:  Gallstone pancreatitis    S/P cholecystectomy  7/2021 laparoscopic        No Known Allergies      Meds:  acetaminophen     Tablet .. 650 milliGRAM(s) Oral every 6 hours PRN  benzonatate 100 milliGRAM(s) Oral three times a day PRN  enoxaparin Injectable 40 milliGRAM(s) SubCutaneous every 24 hours  guaiFENesin  milliGRAM(s) Oral every 12 hours  simethicone 80 milliGRAM(s) Chew daily      SOCIAL HISTORY:  Smoker:  YES / NO        PACK YEARS:                         WHEN QUIT?  ETOH use:  YES / NO               FREQUENCY / QUANTITY:  Ilicit Drug use:  YES / NO  Occupation:  Assisted device use (Cane / Walker):  Live with:    FAMILY HISTORY:  FH: asthma (Father)    FH: diabetes mellitus (Father)    FH: arthritis (Mother)        VITALS:  Vital Signs Last 24 Hrs  T(C): 36.4 (26 Apr 2022 19:34), Max: 36.8 (25 Apr 2022 20:19)  T(F): 97.6 (26 Apr 2022 19:34), Max: 98.2 (25 Apr 2022 20:19)  HR: 68 (26 Apr 2022 19:34) (62 - 78)  BP: 98/66 (26 Apr 2022 19:34) (98/66 - 110/58)  BP(mean): 67 (26 Apr 2022 11:46) (67 - 75)  RR: 18 (26 Apr 2022 19:34) (17 - 18)  SpO2: 97% (26 Apr 2022 19:34) (97% - 99%)    LABS/DIAGNOSTIC TESTS:               Respiratory Viral Panel with COVID-19 by SMITH (04.23.22 @ 11:23)   Rapid RVP Result: Detected  Coronavirus (229E,HKU1,NL63,OC43): Detected                      12.6   7.02  )-----------( 284      ( 26 Apr 2022 05:50 )             39.7     WBC Count: 7.02 K/uL (04-26 @ 05:50)  WBC Count: 6.33 K/uL (04-25 @ 05:40)  WBC Count: 6.17 K/uL (04-24 @ 06:15)      04-26    138  |  105  |  16  ----------------------------<  86  3.9   |  28  |  0.66    Ca    9.2      26 Apr 2022 05:50                    LACTATE:    ABG -     CULTURES:   .Sputum Sputum  04-25 @ 12:16   Normal Respiratory Gris present  --    Moderate polymorphonuclear leukocytes per low power field  Moderate Squamous epithelial cells per low power field  Numerous Gram positive cocci in pairs per oil power field  Moderate Gram Variable Rods per oil power field  Results consistent with oropharyngeal contamination          RADIOLOGY:< from: Xray Chest 2 Views PA/Lat (04.23.22 @ 12:02) >  ACC: [id number] EXAM:  XR CHEST PA LAT 2V                          PROCEDURE DATE:  04/23/2022          INTERPRETATION:  HISTORY:  Chest Pain; ; ; ;  TECHNIQUE: Two views of the chest, PA and lateral.  COMPARISON: NONE.  FINDINGS:    HEART: Normal insize  LUNGS: Bilateral prominent interstitial markings likely representing   underlying fibrosis. Biapical pleural thickening is seen. Right   hemidiaphragm is mildly elevated. No pneumothorax. No effusion  OSSEOUS STRUCTURES: Within normal limits.  Cholecystectomy clips  IMPRESSION:  Pulmonary fibrosis.    --- End of Report ---            CARLO BARRETT MD; Attending Interventional Radiologist  This document has been electronically signed. Apr 24 2022  9:23AM    < end of copied text >        ROS  [  ] UNABLE TO ELICIT               HPI:  71M with PMH gallstone pancreatitis s/p cholecystectomy 7/2021 at [facility], p/w increased sputum production x2 wks, dyspnea and chest pain on exertion. Pt states that he developed a productive cough with white sputum about 2 weeks ago associated with ~10lb weight loss (adjusted his belt 1 notch over) and chills. Pt reports dyspnea on exertion with associated left sided chest pain radiating to the left shoulder 4-5/10 after walking 1 block. Denies fever, abdominal pain, nausea/vomiting, diarrhea.   No history of cancer. Pt mother with uterine cancer. No recent travel, no known TB Exposure. (23 Apr 2022 16:53)        History as above, who presents to the hospital with a cough with whitish sputum x 2 weeks but no hemoptysis, he also c/o a 10lb weight loss and chills but denies any fevers or night sweats, he has no recent travel history and has been in this [place].  He was found to have bilat infiltrates on his CXR , suggestive of pulmonary fibrosis. He was found to be positive for Coronavirus infection ( NOT COVID - 19) and his Quantiferon gold test is positive and so placed in isolation to r/o TB. He denies any other complaints at this time.        PAST MEDICAL & SURGICAL HISTORY:  Gallstone pancreatitis    S/P cholecystectomy  7/2021 laparoscopic        No Known Allergies      Meds:  acetaminophen     Tablet .. 650 milliGRAM(s) Oral every 6 hours PRN  benzonatate 100 milliGRAM(s) Oral three times a day PRN  enoxaparin Injectable 40 milliGRAM(s) SubCutaneous every 24 hours  guaiFENesin  milliGRAM(s) Oral every 12 hours  simethicone 80 milliGRAM(s) Chew daily      SOCIAL HISTORY:  Smoker:  no  ETOH use:  no  Ilicit Drug use:  no      FAMILY HISTORY:  FH: asthma (Father)    FH: diabetes mellitus (Father)    FH: arthritis (Mother)        VITALS:  Vital Signs Last 24 Hrs  T(C): 36.4 (26 Apr 2022 19:34), Max: 36.8 (25 Apr 2022 20:19)  T(F): 97.6 (26 Apr 2022 19:34), Max: 98.2 (25 Apr 2022 20:19)  HR: 68 (26 Apr 2022 19:34) (62 - 78)  BP: 98/66 (26 Apr 2022 19:34) (98/66 - 110/58)  BP(mean): 67 (26 Apr 2022 11:46) (67 - 75)  RR: 18 (26 Apr 2022 19:34) (17 - 18)  SpO2: 97% (26 Apr 2022 19:34) (97% - 99%)    LABS/DIAGNOSTIC TESTS:               Respiratory Viral Panel with COVID-19 by SMITH (04.23.22 @ 11:23)   Rapid RVP Result: Detected  Coronavirus (229E,HKU1,NL63,OC43): Detected                      12.6   7.02  )-----------( 284      ( 26 Apr 2022 05:50 )             39.7     WBC Count: 7.02 K/uL (04-26 @ 05:50)  WBC Count: 6.33 K/uL (04-25 @ 05:40)  WBC Count: 6.17 K/uL (04-24 @ 06:15)      04-26    138  |  105  |  16  ----------------------------<  86  3.9   |  28  |  0.66    Ca    9.2      26 Apr 2022 05:50                    LACTATE:    ABG -     CULTURES:   .Sputum Sputum  04-25 @ 12:16   Normal Respiratory Gris present  --    Moderate polymorphonuclear leukocytes per low power field  Moderate Squamous epithelial cells per low power field  Numerous Gram positive cocci in pairs per oil power field  Moderate Gram Variable Rods per oil power field  Results consistent with oropharyngeal contamination          RADIOLOGY:< from: Xray Chest 2 Views PA/Lat (04.23.22 @ 12:02) >  ACC: [id number] EXAM:  XR CHEST PA LAT 2V                          PROCEDURE DATE:  04/23/2022          INTERPRETATION:  HISTORY:  Chest Pain; ; ; ;  TECHNIQUE: Two views of the chest, PA and lateral.  COMPARISON: NONE.  FINDINGS:    HEART: Normal insize  LUNGS: Bilateral prominent interstitial markings likely representing   underlying fibrosis. Biapical pleural thickening is seen. Right   hemidiaphragm is mildly elevated. No pneumothorax. No effusion  OSSEOUS STRUCTURES: Within normal limits.  Cholecystectomy clips  IMPRESSION:  Pulmonary fibrosis.    --- End of Report ---            CARLO BARRETT MD; Attending Interventional Radiologist  This document has been electronically signed. Apr 24 2022  9:23AM    < end of copied text >        ROS  [  ] UNABLE TO ELICIT

## 2022-04-27 LAB
ANION GAP SERPL CALC-SCNC: 5 MMOL/L — SIGNIFICANT CHANGE UP (ref 5–17)
BUN SERPL-MCNC: 21 MG/DL — HIGH (ref 7–18)
CALCIUM SERPL-MCNC: 9.5 MG/DL — SIGNIFICANT CHANGE UP (ref 8.4–10.5)
CHLORIDE SERPL-SCNC: 103 MMOL/L — SIGNIFICANT CHANGE UP (ref 96–108)
CO2 SERPL-SCNC: 30 MMOL/L — SIGNIFICANT CHANGE UP (ref 22–31)
CREAT SERPL-MCNC: 0.74 MG/DL — SIGNIFICANT CHANGE UP (ref 0.5–1.3)
CULTURE RESULTS: SIGNIFICANT CHANGE UP
EGFR: 97 ML/MIN/1.73M2 — SIGNIFICANT CHANGE UP
GLUCOSE SERPL-MCNC: 88 MG/DL — SIGNIFICANT CHANGE UP (ref 70–99)
HCT VFR BLD CALC: 39.9 % — SIGNIFICANT CHANGE UP (ref 39–50)
HGB BLD-MCNC: 12.9 G/DL — LOW (ref 13–17)
MCHC RBC-ENTMCNC: 28.9 PG — SIGNIFICANT CHANGE UP (ref 27–34)
MCHC RBC-ENTMCNC: 32.3 GM/DL — SIGNIFICANT CHANGE UP (ref 32–36)
MCV RBC AUTO: 89.5 FL — SIGNIFICANT CHANGE UP (ref 80–100)
NIGHT BLUE STAIN TISS: SIGNIFICANT CHANGE UP
NRBC # BLD: 0 /100 WBCS — SIGNIFICANT CHANGE UP (ref 0–0)
PLATELET # BLD AUTO: 283 K/UL — SIGNIFICANT CHANGE UP (ref 150–400)
POTASSIUM SERPL-MCNC: 4.1 MMOL/L — SIGNIFICANT CHANGE UP (ref 3.5–5.3)
POTASSIUM SERPL-SCNC: 4.1 MMOL/L — SIGNIFICANT CHANGE UP (ref 3.5–5.3)
RBC # BLD: 4.46 M/UL — SIGNIFICANT CHANGE UP (ref 4.2–5.8)
RBC # FLD: 13.3 % — SIGNIFICANT CHANGE UP (ref 10.3–14.5)
SODIUM SERPL-SCNC: 138 MMOL/L — SIGNIFICANT CHANGE UP (ref 135–145)
SPECIMEN SOURCE: SIGNIFICANT CHANGE UP
WBC # BLD: 6.92 K/UL — SIGNIFICANT CHANGE UP (ref 3.8–10.5)
WBC # FLD AUTO: 6.92 K/UL — SIGNIFICANT CHANGE UP (ref 3.8–10.5)

## 2022-04-27 PROCEDURE — 71260 CT THORAX DX C+: CPT | Mod: 26

## 2022-04-27 PROCEDURE — 74177 CT ABD & PELVIS W/CONTRAST: CPT | Mod: 26

## 2022-04-27 RX ORDER — IOHEXOL 300 MG/ML
30 INJECTION, SOLUTION INTRAVENOUS ONCE
Refills: 0 | Status: COMPLETED | OUTPATIENT
Start: 2022-04-27 | End: 2022-04-27

## 2022-04-27 RX ADMIN — SIMETHICONE 80 MILLIGRAM(S): 80 TABLET, CHEWABLE ORAL at 14:06

## 2022-04-27 RX ADMIN — Medication 600 MILLIGRAM(S): at 17:43

## 2022-04-27 RX ADMIN — ENOXAPARIN SODIUM 40 MILLIGRAM(S): 100 INJECTION SUBCUTANEOUS at 23:17

## 2022-04-27 RX ADMIN — IOHEXOL 30 MILLILITER(S): 300 INJECTION, SOLUTION INTRAVENOUS at 17:44

## 2022-04-27 RX ADMIN — Medication 600 MILLIGRAM(S): at 06:20

## 2022-04-27 NOTE — PROGRESS NOTE ADULT - PROBLEM SELECTOR PLAN 1
p/w chest pain  ACS protocol - asa, statin  Hold bb as pt bp low  EKG shows  Trop x2 neg  Tele Monitoring  f/u Echo
p/w chest pain  ACS protocol - asa, statin  Hold bb as pt bp low  EKG shows  Trop x2 neg  Tele Monitoring  Echo - WNL  - Stress test - WNL
p/w chest pain  ACS protocol - asa, statin  Hold bb as pt bp low  EKG shows  Trop x2 neg  Tele Monitoring  Echo - WNL  - Stress test in the morning.
p/w productive cough x2 wks - may be acute bronchitis  positive for coronavirus which is not covid as per infection control and does not require isolation  send sputum culture  procalcitonin <0.02  Quantiferon positive  - Pending 3 sputum cultures.   Tessalon perles  s/p azithromycin 500 qd  ct chest ap r/o mass in setting of weight loss  -

## 2022-04-27 NOTE — PROGRESS NOTE ADULT - ASSESSMENT
71M with PMH gallstone pancreatitis s/p cholecystectomy 7/2021 at [facility], p/w increased sputum production x2 wks, dyspnea and chest pain on exertion admitted for chest pain and productive cough.

## 2022-04-27 NOTE — PROGRESS NOTE ADULT - NUTRITIONAL ASSESSMENT
Diet, Regular (04-23-22 @ 16:53) [Active]

## 2022-04-27 NOTE — PROGRESS NOTE ADULT - ATTENDING COMMENTS
Seen and examined . R/O Pulmonary TB. CT scans pending.
Seen and examined earlier today . Cardiac work up noted. CT scan pending. Will consult ID .
Seen and examined earlier today . I am still weak and coughing. CT scan chest and abdomen pending.
Seen and examined earlier today . I still feel weak and chest congested. CT scans pending.
right upper arm

## 2022-04-27 NOTE — PROGRESS NOTE ADULT - PROBLEM SELECTOR PLAN 2
p/w productive cough x2 wks - may be acute bronchitis  positive for coronavirus which is not covid as per infection control and does not require isolation  resend covid pcr in 24hrs  send sputum culture  f/u procalcitonin  Send Quantiferon gold. [place] moved to [place]24yrs ago so TB Suspicion is low. However, pt with productive 2wk cough, weight loss and chills. If quantiferon gold positive, consider sending sputum afb x3.   Tessalon perles  start azithromycin 500 qd  ct chest ap r/o mass in setting of weight loss
p/w productive cough x2 wks - may be acute bronchitis  positive for coronavirus which is not covid as per infection control and does not require isolation  send sputum culture  procalcitonin <0.02  Send QuantiFeron gold. [place] moved to [place] 23[place]24yrs ago so TB Suspicion is low. However, pt with productive 2wk cough, weight loss and chills. If QuantiFeron gold positive, consider sending sputum afib x3.   Tessalon perles  s/p azithromycin 500 qd  ct chest ap r/o mass in setting of weight loss
p/w productive cough x2 wks - may be acute bronchitis  positive for coronavirus which is not covid as per infection control and does not require isolation  send sputum culture  procalcitonin <0.02  Send Quantiferon gold. [place] moved to [place] 23[place]24yrs ago so TB Suspicion is low. However, pt with productive 2wk cough, weight loss and chills. If quantiferon gold positive, consider sending sputum afb x3.   Tessalon perles  s/p azithromycin 500 qd  ct chest ap r/o mass in setting of weight loss
p/w chest pain  ACS protocol - asa, statin  Hold bb as pt bp low  EKG shows  Trop x2 neg  Tele Monitoring  Echo - WNL  - Stress test - WNL  - Resolved.

## 2022-04-27 NOTE — PROGRESS NOTE ADULT - SUBJECTIVE AND OBJECTIVE BOX
C A R D I O L O G Y  **********************************     DATE OF SERVICE: 04-27-22    Patient denies chest pain or shortness of breath.   Review of symptoms otherwise negative.    acetaminophen     Tablet .. 650 milliGRAM(s) Oral every 6 hours PRN  benzonatate 100 milliGRAM(s) Oral three times a day PRN  enoxaparin Injectable 40 milliGRAM(s) SubCutaneous every 24 hours  guaiFENesin  milliGRAM(s) Oral every 12 hours  iohexol 300 mG (iodine)/mL Oral Solution 30 milliLiter(s) Oral once  simethicone 80 milliGRAM(s) Chew daily                            12.9   6.92  )-----------( 283      ( 27 Apr 2022 07:03 )             39.9       Hemoglobin: 12.9 g/dL (04-27 @ 07:03)  Hemoglobin: 12.6 g/dL (04-26 @ 05:50)  Hemoglobin: 12.4 g/dL (04-25 @ 05:40)  Hemoglobin: 11.8 g/dL (04-24 @ 06:15)  Hemoglobin: 11.5 g/dL (04-23 @ 11:23)      04-27    138  |  103  |  21<H>  ----------------------------<  88  4.1   |  30  |  0.74    Ca    9.5      27 Apr 2022 07:03      Creatinine Trend: 0.74<--, 0.66<--, 0.70<--, 0.63<--, 0.74<--    COAGS:           T(C): 36.3 (04-27-22 @ 08:23), Max: 36.9 (04-26-22 @ 23:20)  HR: 75 (04-27-22 @ 08:23) (60 - 80)  BP: 98/70 (04-27-22 @ 08:23) (98/66 - 110/65)  RR: 19 (04-27-22 @ 08:23) (18 - 19)  SpO2: 98% (04-27-22 @ 08:23) (95% - 98%)  Wt(kg): --    I&O's Summary      HEENT:  (-)icterus (-)pallor  CV: N S1 S2 1/6 RISHI (+)2 Pulses B/l  Resp:  Clear to ausculatation B/L, normal effort  GI: (+) BS Soft, NT, ND  Lymph:  (-)Edema, (-)obvious lymphadenopathy  Skin: Warm to touch, Normal turgor  Psych: Appropriate mood and affect      TELEMETRY: 	  Sinus, PVCs         ASSESSMENT/PLAN: 	71y  Male with PMH gallstone pancreatitis s/p cholecystectomy 7/2021 at [facility] who is being seen for chest pain.    - pt. currently chest pain free  - MI ruled out  - Echo with no pertinent findings   - Stress with no ischemia or infarct  - Can D/C tele  - No need for further inpatient cardiac work up.  - f/u AFB    Jeevan Arrington MD, [facility]  BEEPER [phone]

## 2022-04-27 NOTE — PROGRESS NOTE ADULT - SUBJECTIVE AND OBJECTIVE BOX
PGY-1 Progress Note discussed with attending    PAGER #: [[phone]] TILL 5:00 PM  PLEASE CONTACT ON CALL TEAM:  - On Call Team (Please refer to Leonela) FROM 5:00 PM - 8:30PM  - Nightfloat Team FROM 8:30 -7:30 AM    CHIEF COMPLAINT & BRIEF HOSPITAL COURSE: HPI:  71M with PMH gallstone pancreatitis s/p cholecystectomy 7/2021 at [facility], p/w increased sputum production x2 wks, dyspnea and chest pain on exertion. Pt states that he developed a productive cough with white sputum about 2 weeks ago associated with ~10lb weight loss (adjusted his belt 1 notch over) and chills. Pt reports dyspnea on exertion with associated left sided chest pain radiating to the left shoulder 4-5/10 after walking 1 block. Denies fever, abdominal pain, nausea/vomiting, diarrhea.     No history of cancer. Pt mother with uterine cancer. No recent travel, no known TB Exposure. (23 Apr 2022 16:53)      INTERVAL HPI/OVERNIGHT EVENTS: Patient was examined while he was lying in bed, Aox3, responding appropriately to questions, in NAD. He has normal bowel and bladder movements, and denies any other acute complaints. Pt denied any chest pain, shortness of breath, nausea, vomiting or abdominal pain.       REVIEW OF SYSTEMS:  CONSTITUTIONAL: No fever, weight loss, or fatigue  RESPIRATORY: No cough, wheezing, chills or hemoptysis; No shortness of breath  CARDIOVASCULAR: No chest pain, palpitations, dizziness, or leg swelling  GASTROINTESTINAL: No abdominal pain. No nausea, vomiting, or hematemesis; No diarrhea or constipation. No melena or hematochezia.  GENITOURINARY: No dysuria or hematuria, urinary frequency  NEUROLOGICAL: No headaches, memory loss, loss of strength, numbness, or tremors  SKIN: No itching, burning, rashes, or lesions     Vital Signs Last 24 Hrs  T(C): 36.3 (27 Apr 2022 08:23), Max: 36.9 (26 Apr 2022 23:20)  T(F): 97.3 (27 Apr 2022 08:23), Max: 98.5 (26 Apr 2022 23:20)  HR: 75 (27 Apr 2022 08:23) (60 - 80)  BP: 98/70 (27 Apr 2022 08:23) (98/66 - 110/65)  BP(mean): 67 (26 Apr 2022 11:46) (67 - 67)  RR: 19 (27 Apr 2022 08:23) (18 - 19)  SpO2: 98% (27 Apr 2022 08:23) (95% - 98%)    MEDICATIONS  (STANDING):  enoxaparin Injectable 40 milliGRAM(s) SubCutaneous every 24 hours  guaiFENesin  milliGRAM(s) Oral every 12 hours  iohexol 300 mG (iodine)/mL Oral Solution 30 milliLiter(s) Oral once  simethicone 80 milliGRAM(s) Chew daily    MEDICATIONS  (PRN):  acetaminophen     Tablet .. 650 milliGRAM(s) Oral every 6 hours PRN Temp greater or equal to 38C (100.4F), Moderate Pain (4 - 6)  benzonatate 100 milliGRAM(s) Oral three times a day PRN Cough      PHYSICAL EXAMINATION:  GENERAL: NAD, well built  HEAD:  Atraumatic, Normocephalic  EYES:  conjunctiva and sclera clear  NECK: Supple, No JVD,   CHEST/LUNG: Clear to auscultation. No rales, rhonchi, wheezing, or rubs  HEART: Regular rate and rhythm; No murmurs, rubs, or gallops  ABDOMEN: Soft, Nontender, Nondistended; Bowel sounds present  NERVOUS SYSTEM:  Alert & Oriented X3,    EXTREMITIES:  2+ Peripheral Pulses, No clubbing, cyanosis, or edema  SKIN: warm dry                          12.9   6.92  )-----------( 283      ( 27 Apr 2022 07:03 )             39.9     04-27    138  |  103  |  21<H>  ----------------------------<  88  4.1   |  30  |  0.74    Ca    9.5      27 Apr 2022 07:03                CAPILLARY BLOOD GLUCOSE      RADIOLOGY & ADDITIONAL TESTS:

## 2022-04-28 ENCOUNTER — TRANSCRIPTION ENCOUNTER (OUTPATIENT)
Age: 72
End: 2022-04-28

## 2022-04-28 VITALS
RESPIRATION RATE: 17 BRPM | OXYGEN SATURATION: 96 % | DIASTOLIC BLOOD PRESSURE: 70 MMHG | HEART RATE: 66 BPM | SYSTOLIC BLOOD PRESSURE: 119 MMHG | TEMPERATURE: 98 F

## 2022-04-28 LAB
ANION GAP SERPL CALC-SCNC: 6 MMOL/L — SIGNIFICANT CHANGE UP (ref 5–17)
BUN SERPL-MCNC: 17 MG/DL — SIGNIFICANT CHANGE UP (ref 7–18)
CALCIUM SERPL-MCNC: 9.2 MG/DL — SIGNIFICANT CHANGE UP (ref 8.4–10.5)
CHLORIDE SERPL-SCNC: 101 MMOL/L — SIGNIFICANT CHANGE UP (ref 96–108)
CO2 SERPL-SCNC: 29 MMOL/L — SIGNIFICANT CHANGE UP (ref 22–31)
CREAT SERPL-MCNC: 0.72 MG/DL — SIGNIFICANT CHANGE UP (ref 0.5–1.3)
EGFR: 98 ML/MIN/1.73M2 — SIGNIFICANT CHANGE UP
GLUCOSE SERPL-MCNC: 88 MG/DL — SIGNIFICANT CHANGE UP (ref 70–99)
HCT VFR BLD CALC: 39.3 % — SIGNIFICANT CHANGE UP (ref 39–50)
HGB BLD-MCNC: 12.8 G/DL — LOW (ref 13–17)
MCHC RBC-ENTMCNC: 28.5 PG — SIGNIFICANT CHANGE UP (ref 27–34)
MCHC RBC-ENTMCNC: 32.6 GM/DL — SIGNIFICANT CHANGE UP (ref 32–36)
MCV RBC AUTO: 87.5 FL — SIGNIFICANT CHANGE UP (ref 80–100)
NRBC # BLD: 0 /100 WBCS — SIGNIFICANT CHANGE UP (ref 0–0)
PLATELET # BLD AUTO: 284 K/UL — SIGNIFICANT CHANGE UP (ref 150–400)
POTASSIUM SERPL-MCNC: 4.1 MMOL/L — SIGNIFICANT CHANGE UP (ref 3.5–5.3)
POTASSIUM SERPL-SCNC: 4.1 MMOL/L — SIGNIFICANT CHANGE UP (ref 3.5–5.3)
RBC # BLD: 4.49 M/UL — SIGNIFICANT CHANGE UP (ref 4.2–5.8)
RBC # FLD: 13.2 % — SIGNIFICANT CHANGE UP (ref 10.3–14.5)
SODIUM SERPL-SCNC: 136 MMOL/L — SIGNIFICANT CHANGE UP (ref 135–145)
WBC # BLD: 7.14 K/UL — SIGNIFICANT CHANGE UP (ref 3.8–10.5)
WBC # FLD AUTO: 7.14 K/UL — SIGNIFICANT CHANGE UP (ref 3.8–10.5)

## 2022-04-28 PROCEDURE — 85027 COMPLETE CBC AUTOMATED: CPT

## 2022-04-28 PROCEDURE — 74177 CT ABD & PELVIS W/CONTRAST: CPT

## 2022-04-28 PROCEDURE — 80061 LIPID PANEL: CPT

## 2022-04-28 PROCEDURE — 84145 PROCALCITONIN (PCT): CPT

## 2022-04-28 PROCEDURE — 0225U NFCT DS DNA&RNA 21 SARSCOV2: CPT

## 2022-04-28 PROCEDURE — 83605 ASSAY OF LACTIC ACID: CPT

## 2022-04-28 PROCEDURE — 83615 LACTATE (LD) (LDH) ENZYME: CPT

## 2022-04-28 PROCEDURE — 82550 ASSAY OF CK (CPK): CPT

## 2022-04-28 PROCEDURE — 85379 FIBRIN DEGRADATION QUANT: CPT

## 2022-04-28 PROCEDURE — 78452 HT MUSCLE IMAGE SPECT MULT: CPT

## 2022-04-28 PROCEDURE — 86140 C-REACTIVE PROTEIN: CPT

## 2022-04-28 PROCEDURE — 87206 SMEAR FLUORESCENT/ACID STAI: CPT

## 2022-04-28 PROCEDURE — 84443 ASSAY THYROID STIM HORMONE: CPT

## 2022-04-28 PROCEDURE — 80053 COMPREHEN METABOLIC PANEL: CPT

## 2022-04-28 PROCEDURE — 85025 COMPLETE CBC W/AUTO DIFF WBC: CPT

## 2022-04-28 PROCEDURE — 85652 RBC SED RATE AUTOMATED: CPT

## 2022-04-28 PROCEDURE — 82728 ASSAY OF FERRITIN: CPT

## 2022-04-28 PROCEDURE — 84132 ASSAY OF SERUM POTASSIUM: CPT

## 2022-04-28 PROCEDURE — 86431 RHEUMATOID FACTOR QUANT: CPT

## 2022-04-28 PROCEDURE — 82803 BLOOD GASES ANY COMBINATION: CPT

## 2022-04-28 PROCEDURE — 99285 EMERGENCY DEPT VISIT HI MDM: CPT | Mod: 25

## 2022-04-28 PROCEDURE — 84295 ASSAY OF SERUM SODIUM: CPT

## 2022-04-28 PROCEDURE — 93306 TTE W/DOPPLER COMPLETE: CPT

## 2022-04-28 PROCEDURE — 82164 ANGIOTENSIN I ENZYME TEST: CPT

## 2022-04-28 PROCEDURE — 82962 GLUCOSE BLOOD TEST: CPT

## 2022-04-28 PROCEDURE — 71046 X-RAY EXAM CHEST 2 VIEWS: CPT

## 2022-04-28 PROCEDURE — 82330 ASSAY OF CALCIUM: CPT

## 2022-04-28 PROCEDURE — 87015 SPECIMEN INFECT AGNT CONCNTJ: CPT

## 2022-04-28 PROCEDURE — A9502: CPT

## 2022-04-28 PROCEDURE — 83735 ASSAY OF MAGNESIUM: CPT

## 2022-04-28 PROCEDURE — 84484 ASSAY OF TROPONIN QUANT: CPT

## 2022-04-28 PROCEDURE — 86481 TB AG RESPONSE T-CELL SUSP: CPT

## 2022-04-28 PROCEDURE — 83036 HEMOGLOBIN GLYCOSYLATED A1C: CPT

## 2022-04-28 PROCEDURE — 87070 CULTURE OTHR SPECIMN AEROBIC: CPT

## 2022-04-28 PROCEDURE — 87116 MYCOBACTERIA CULTURE: CPT

## 2022-04-28 PROCEDURE — 36415 COLL VENOUS BLD VENIPUNCTURE: CPT

## 2022-04-28 PROCEDURE — 86038 ANTINUCLEAR ANTIBODIES: CPT

## 2022-04-28 PROCEDURE — 83880 ASSAY OF NATRIURETIC PEPTIDE: CPT

## 2022-04-28 PROCEDURE — 71260 CT THORAX DX C+: CPT

## 2022-04-28 PROCEDURE — 82553 CREATINE MB FRACTION: CPT

## 2022-04-28 PROCEDURE — 84100 ASSAY OF PHOSPHORUS: CPT

## 2022-04-28 PROCEDURE — 93017 CV STRESS TEST TRACING ONLY: CPT

## 2022-04-28 PROCEDURE — 93005 ELECTROCARDIOGRAM TRACING: CPT

## 2022-04-28 PROCEDURE — 87635 SARS-COV-2 COVID-19 AMP PRB: CPT

## 2022-04-28 PROCEDURE — 80048 BASIC METABOLIC PNL TOTAL CA: CPT

## 2022-04-28 RX ADMIN — Medication 600 MILLIGRAM(S): at 05:30

## 2022-04-28 RX ADMIN — SIMETHICONE 80 MILLIGRAM(S): 80 TABLET, CHEWABLE ORAL at 12:23

## 2022-04-28 RX ADMIN — Medication 600 MILLIGRAM(S): at 18:21

## 2022-04-28 NOTE — DISCHARGE NOTE PROVIDER - NSDCCPCAREPLAN_GEN_ALL_CORE_FT
PRINCIPAL DISCHARGE DIAGNOSIS  Diagnosis: Exertional chest pain  Assessment and Plan of Treatment: You came into the hospital with chest pains, we checkd blood levels that would indicate any damage to the heart they were within normal. We did an echo cardiogram which didnt show any causes for the chest pain. We also did a stress test that did not show any reversable changes. Please follow up with a cardiologist on discharge for further workup. Please follow up with your primary care doctor within 2 weeks of discharge.        SECONDARY DISCHARGE DIAGNOSES  Diagnosis: Tuberculosis high risk  Assessment and Plan of Treatment: You had one of the blood works that came back positive for TB. The test to confirm this disease is with multiple sputum culutres which were negative x3. At this time we dont think you have active infection. We also had a CXR and CT scan performed which did not show any signs of infection. Please follow up with your primary care doctor within 2 weeks of discharge.       PRINCIPAL DISCHARGE DIAGNOSIS  Diagnosis: Exertional chest pain  Assessment and Plan of Treatment: You came into the hospital with chest pains, we checkd blood levels that would indicate any damage to the heart they were within normal. We did an echo cardiogram which didnt show any causes for the chest pain. We also did a stress test that did not show any reversable changes. Please follow up with a cardiologist on discharge for further workup. Please follow up with your primary care doctor within 2 weeks of discharge.   Please follow up with the pulmonologist for further workup.         SECONDARY DISCHARGE DIAGNOSES  Diagnosis: Tuberculosis high risk  Assessment and Plan of Treatment: You had one of the blood works that came back positive for TB. The test to confirm this disease is with multiple sputum culutres which were negative x3. At this time we dont think you have active infection. We also had a CXR and CT scan performed which did not show any signs of infection. Please follow up with your primary care doctor within 2 weeks of discharge.

## 2022-04-28 NOTE — PROGRESS NOTE ADULT - SUBJECTIVE AND OBJECTIVE BOX
C A R D I O L O G Y  **********************************     DATE OF SERVICE: 04-28-22    Patient denies chest pain or shortness of breath.   Review of symptoms otherwise negative.    acetaminophen     Tablet .. 650 milliGRAM(s) Oral every 6 hours PRN  benzonatate 100 milliGRAM(s) Oral three times a day PRN  enoxaparin Injectable 40 milliGRAM(s) SubCutaneous every 24 hours  guaiFENesin  milliGRAM(s) Oral every 12 hours  simethicone 80 milliGRAM(s) Chew daily                            12.8   7.14  )-----------( 284      ( 28 Apr 2022 07:06 )             39.3       Hemoglobin: 12.8 g/dL (04-28 @ 07:06)  Hemoglobin: 12.9 g/dL (04-27 @ 07:03)  Hemoglobin: 12.6 g/dL (04-26 @ 05:50)  Hemoglobin: 12.4 g/dL (04-25 @ 05:40)  Hemoglobin: 11.8 g/dL (04-24 @ 06:15)      04-28    136  |  101  |  17  ----------------------------<  88  4.1   |  29  |  0.72    Ca    9.2      28 Apr 2022 07:06      Creatinine Trend: 0.72<--, 0.74<--, 0.66<--, 0.70<--, 0.63<--, 0.74<--    COAGS:           T(C): 36.4 (04-28-22 @ 07:39), Max: 36.7 (04-27-22 @ 20:23)  HR: 70 (04-28-22 @ 07:39) (62 - 70)  BP: 109/- (04-28-22 @ 07:39) (97/55 - 112/62)  RR: 18 (04-28-22 @ 07:39) (17 - 18)  SpO2: 98% (04-28-22 @ 07:39) (96% - 100%)  Wt(kg): --    I&O's Summary      HEENT:  (-)icterus (-)pallor  CV: N S1 S2 1/6 RISHI (+)2 Pulses B/l  Resp:  Clear to ausculatation B/L, normal effort  GI: (+) BS Soft, NT, ND  Lymph:  (-)Edema, (-)obvious lymphadenopathy  Skin: Warm to touch, Normal turgor  Psych: Appropriate mood and affect      TELEMETRY: 	  off        ASSESSMENT/PLAN: 	71y  Male with PMH gallstone pancreatitis s/p cholecystectomy 7/2021 at [facility] who is being seen for chest pain.    - pt. currently chest pain free  - MI ruled out  - Echo with no pertinent findings   - Stress with no ischemia or infarct  - No need for further inpatient cardiac work up.  - AFB (-), D/C planning per nestor Arrington MD, [facility]  BEEPER [phone]

## 2022-04-28 NOTE — DISCHARGE NOTE PROVIDER - HOSPITAL COURSE
71M with PMH gallstone pancreatitis s/p cholecystectomy 7/2021 at [facility], p/w increased sputum production x2 wks, dyspnea and chest pain on exertion. Pt states that he developed a productive cough with white sputum about 2 weeks ago associated with ~10lb weight loss (adjusted his belt 1 notch over) and chills. Pt reports dyspnea on exertion with associated left sided chest pain radiating to the left shoulder 4-5/10 after walking 1 block. Denies fever, abdominal pain, nausea/vomiting, diarrhea.     No history of cancer. Pt mother with uterine cancer. No recent travel, no known TB Exposure.   PT was seen by cardiolgoy and had an ECHO performed which didn't show anything significant, he also had a stress test which was negative for any reversible ischemic changes. On admission due to pts history and immigration status a QuantiFeron was sent which came back positive. He had 3 negative AFB and was seen by infectious disease that concurred that this was not active TB. Patient is stable for discharge. Patient has been advised to follow up as outpatient. Case has been discussed with the attending. This is just a summary of the case. For further information, please refer to patient chart document.   71M with [facility] gallstone pancreatitis s/p cholecystectomy 7/2021 at [facility], p/w increased sputum production x2 wks, dyspnea and chest pain on exertion. Pt states that he developed a productive cough with white sputum about 2 weeks ago associated with ~10lb weight loss (adjusted his belt 1 notch over) and chills. Pt reports dyspnea on exertion with associated left sided chest pain radiating to the left shoulder 4-5/10 after walking 1 block. Denies fever, abdominal pain, nausea/vomiting, diarrhea.     No history of cancer. Pt mother with uterine cancer. No recent travel, no known TB Exposure.   PT was seen by cardiolgoy and had an ECHO performed which didn't show anything significant, he also had a stress test which was negative for any reversible ischemic changes. On admission due to pts history and immigration status a QuantiFeron was sent which came back positive. He had 3 negative AFB and was seen by infectious disease that concurred that this was not active TB. Patient is stable for discharge. Patient has been advised to follow up as outpatient. Case has been discussed with the attending. This is just a summary of the case. For further information, please refer to patient chart document.  Seen and examined earlier. Feeling better . Spoke to his family on phone . CT scans reviewed so Pulmonary consulted .

## 2022-04-28 NOTE — PROGRESS NOTE ADULT - SUBJECTIVE AND OBJECTIVE BOX
71y Male is under our care for     REVIEW OF SYSTEMS:  [  ] Not able to elicit      MEDS:    ALLERGIES: Allergies    No Known Allergies    Intolerances        VITALS:  Vital Signs Last 24 Hrs  T(C): 36.6 (28 Apr 2022 11:22), Max: 36.7 (27 Apr 2022 20:23)  T(F): 97.9 (28 Apr 2022 11:22), Max: 98 (27 Apr 2022 20:23)  HR: 71 (28 Apr 2022 11:22) (62 - 71)  BP: 106/52 (28 Apr 2022 11:22) (97/55 - 112/62)  BP(mean): --  RR: 19 (28 Apr 2022 11:22) (17 - 19)  SpO2: 100% (28 Apr 2022 11:22) (96% - 100%)      PHYSICAL EXAM:      LABS/DIAGNOSTIC TESTS:                        12.8   7.14  )-----------( 284      ( 28 Apr 2022 07:06 )             39.3     WBC Count: 7.14 K/uL (04-28 @ 07:06)  WBC Count: 6.92 K/uL (04-27 @ 07:03)  WBC Count: 7.02 K/uL (04-26 @ 05:50)  WBC Count: 6.33 K/uL (04-25 @ 05:40)  WBC Count: 6.17 K/uL (04-24 @ 06:15)    04-28    136  |  101  |  17  ----------------------------<  88  4.1   |  29  |  0.72    Ca    9.2      28 Apr 2022 07:06        CULTURES:   .Sputum Sputum  04-27 @ 10:09 --  --  --      .Sputum Sputum  04-26 @ 21:28 --  --  --      .Sputum Sputum  04-25 @ 12:16   Normal Respiratory Gris present  --    Moderate polymorphonuclear leukocytes per low power field  Moderate Squamous epithelial cells per low power field  Numerous Gram positive cocci in pairs per oil power field  Moderate Gram Variable Rods per oil power field  Results consistent with oropharyngeal contamination        RADIOLOGY:  no new studies 71y Male is under our care for pneumonia.  Patient was seen sitting comfortably in the chair eating lunch with no acute distress.  Patient reports improvement in cough and denies any chest pain at this time.  He remains afebrile and WBC count is WNL.    REVIEW OF SYSTEMS:  [  ] Not able to elicit  General: no fevers no malaise  Chest: cough + no sob  GI: no nvd  : no urinary sxs   Skin: no rashes  Musculoskeletal: no trauma no LBP  Neuro: no ha's no dizziness     MEDS:    ALLERGIES: Allergies    No Known Allergies    Intolerances        VITALS:  Vital Signs Last 24 Hrs  T(C): 36.6 (28 Apr 2022 11:22), Max: 36.7 (27 Apr 2022 20:23)  T(F): 97.9 (28 Apr 2022 11:22), Max: 98 (27 Apr 2022 20:23)  HR: 71 (28 Apr 2022 11:22) (62 - 71)  BP: 106/52 (28 Apr 2022 11:22) (97/55 - 112/62)  BP(mean): --  RR: 19 (28 Apr 2022 11:22) (17 - 19)  SpO2: 100% (28 Apr 2022 11:22) (96% - 100%)      PHYSICAL EXAM:  HEENT: n/a  Neck: supple no LN's   Respiratory: lungs clear no rales  Cardiovascular: S1 S2 reg no murmurs  Gastrointestinal: +BS with soft, nondistended abdomen; nontender  Extremities: no edema  Skin: no rashes  Ortho: n/a  Neuro: AAO x 4      LABS/DIAGNOSTIC TESTS:                        12.8   7.14  )-----------( 284      ( 28 Apr 2022 07:06 )             39.3     WBC Count: 7.14 K/uL (04-28 @ 07:06)  WBC Count: 6.92 K/uL (04-27 @ 07:03)  WBC Count: 7.02 K/uL (04-26 @ 05:50)  WBC Count: 6.33 K/uL (04-25 @ 05:40)  WBC Count: 6.17 K/uL (04-24 @ 06:15)    04-28    136  |  101  |  17  ----------------------------<  88  4.1   |  29  |  0.72    Ca    9.2      28 Apr 2022 07:06        CULTURES:   .Sputum Sputum  04-27 @ 10:09 --  --  --      .Sputum Sputum  04-26 @ 21:28 --  --  --      .Sputum Sputum  04-25 @ 12:16   Normal Respiratory Gris present  --    Moderate polymorphonuclear leukocytes per low power field  Moderate Squamous epithelial cells per low power field  Numerous Gram positive cocci in pairs per oil power field  Moderate Gram Variable Rods per oil power field  Results consistent with oropharyngeal contamination        RADIOLOGY:  no new studies

## 2022-04-28 NOTE — DISCHARGE NOTE PROVIDER - NPI NUMBER (FOR SYSADMIN USE ONLY) :
[[id number]],[[id number] [[id number]],[[id number]],[[id number]],[[id number]] [[id number]],[[id number]],[[id number]]

## 2022-04-28 NOTE — DISCHARGE NOTE PROVIDER - PROVIDER TOKENS
PROVIDER:[TOKEN:[[id number]]],PROVIDER:[TOKEN:[[id number]]] PROVIDER:[TOKEN:[[id number]]],PROVIDER:[TOKEN:[[id number]]],PROVIDER:[TOKEN:[[id number]]],PROVIDER:[TOKEN:[[id number]]] PROVIDER:[TOKEN:[[id number]]],PROVIDER:[TOKEN:[[id number]]],PROVIDER:[TOKEN:[[id number]]]

## 2022-04-28 NOTE — PROGRESS NOTE ADULT - ASSESSMENT
Viral infection ( Coronavirus infection, not COVID - 19 )  Pulmonary Fibrosis vs Tuberculosis ( given weight loss, + quantiferon Gold test and bilat infiltrates on CXR, seems unlikely to be TB though)    Plan - R/O TB with sputum for AFB x 3   no need for any antibiotics at this time  put on airborne isolation until we have sputum results.   Viral infection ( Coronavirus infection, not COVID - 19 )  Pulmonary Fibrosis vs Tuberculosis ( given weight loss, + quantiferon Gold test and bilat infiltrates on CXR, seems unlikely to be TB though)    Plan - R/O TB with sputum for AFB x 3   no need for any antibiotics at this time  Please send one more sputum culture

## 2022-04-28 NOTE — CONSULT NOTE ADULT - SUBJECTIVE AND OBJECTIVE BOX
Time of visit:    CHIEF COMPLAINT: Patient is a 71y old  Male who presents with a chief complaint of Dyspnea on exertion (28 Apr 2022 11:41)      HPI:  71M with PMH gallstone pancreatitis s/p cholecystectomy 7/2021 at [facility], p/w increased sputum production x2 wks, dyspnea and chest pain on exertion. Pt states that he developed a productive cough with white sputum about 2 weeks ago associated with ~10lb weight loss (adjusted his belt 1 notch over) and chills. Pt reports dyspnea on exertion with associated left sided chest pain radiating to the left shoulder 4-5/10 after walking 1 block. Denies fever, abdominal pain, nausea/vomiting, diarrhea.     No history of cancer. Pt mother with uterine cancer. No recent travel, no known TB Exposure. (23 Apr 2022 16:53)   Patient seen and examined.     PAST MEDICAL & SURGICAL HISTORY:  Gallstone pancreatitis    S/P cholecystectomy  7/2021 laparoscopic        Allergies    No Known Allergies    Intolerances        MEDICATIONS  (STANDING):  enoxaparin Injectable 40 milliGRAM(s) SubCutaneous every 24 hours  guaiFENesin  milliGRAM(s) Oral every 12 hours  simethicone 80 milliGRAM(s) Chew daily      MEDICATIONS  (PRN):  acetaminophen     Tablet .. 650 milliGRAM(s) Oral every 6 hours PRN Temp greater or equal to 38C (100.4F), Moderate Pain (4 - 6)  benzonatate 100 milliGRAM(s) Oral three times a day PRN Cough   Medications up to date at time of exam.    Medications up to date at time of exam.    FAMILY HISTORY:  FH: asthma (Father)    FH: diabetes mellitus (Father)    FH: arthritis (Mother)        SOCIAL HISTORY  Smoking History: Denies smoking/smoke exposure.   Living Condition: [   ] apartment, [   ] private house  Work History:   Travel History: denies recent travel.   Illicit Substance Use: denies  Alcohol Use: denies    REVIEW OF SYSTEMS:    CONSTITUTIONAL:  No fevers, chills. Denies night sweats. Denies hemoptysis.     HEENT:  Denies diplopia or blurred vision, sore throat or runny nose.    CARDIOVASCULAR:  Denies chest discomfort . No palpitations.    RESPIRATORY:  Presented to ED with Dyspnea worse on exertion.     GASTROINTESTINAL:  Denies abdominal pain, nausea, vomiting or diarrhea.    GENITOURINARY: Denies dysuria, frequency or urgency.    NEUROLOGIC:  No tremors, no seizures .    PSYCHIATRIC:  No emotional distress on exam.     PHYSICAL EXAMINATION:    Vital Signs Last 24 Hrs  T(C): 36.6 (28 Apr 2022 11:22), Max: 36.7 (27 Apr 2022 20:23)  T(F): 97.9 (28 Apr 2022 11:22), Max: 98 (27 Apr 2022 20:23)  HR: 71 (28 Apr 2022 11:22) (62 - 71)  BP: 106/52 (28 Apr 2022 11:22) (106/52 - 112/62)  BP(mean): --  RR: 19 (28 Apr 2022 11:22) (17 - 19)  SpO2: 100% (28 Apr 2022 11:22) (96% - 100%)   (if applicable)    General : Alert and oriented. Able to answer question with no SOB. No acute distress.     HEENT: Head is normocephalic and atraumatic. No nasal tenderness.  Extraocular muscles are intact. Mucous membranes are moist.     NECK: Supple, no palpable adenopathy.    LUNGS: Clear to auscultation bilaterally with no wheezing, rales, or rhonchi. No use of accessory muscle.     HEART: S1 S2 Regular rate and no click/ rub.     ABDOMEN: Soft, nontender, and nondistended. Active bowel sounds.     EXTREMITIES: Without any cyanosis, clubbing, rash, lesions or edema.    NEUROLOGIC: Awake, alert, oriented.     SKIN: Warm and moist. Non diaphoretic.        LABS:                        12.8   7.14  )-----------( 284      ( 28 Apr 2022 07:06 )             39.3     04-28    136  |  101  |  17  ----------------------------<  88  4.1   |  29  |  0.72    Ca    9.2      28 Apr 2022 07:06    MICROBIOLOGY: (if applicable)    RADIOLOGY & ADDITIONAL STUDIES:  EKG:   CXR:  ECHO:    IMPRESSION: 71y Male PAST MEDICAL & SURGICAL HISTORY:  Gallstone pancreatitis    S/P cholecystectomy  7/2021 laparoscopic    Impression: This is a 72 Y/O Male presented to ED with an increased sputum production x2 wks, dyspnea and chest pain on exertion. Pt states that he developed a productive cough with white sputum about 2 weeks ago associated with ~10lb weight loss (adjusted his belt 1 notch over) and chills. Pt reports dyspnea on exertion with associated left sided chest pain radiating to the left shoulder 4-5/10 after walking 1 block. Positive Quantiferon , RVR, Corona Virus infection but not Covid 19. CT Chest with Moderate Pulmonary Fibrosis. Bilateral Hilar Lymphadenopathy.    Suggestion:  O 2 saturation 96% room air. So far saturating good room air.   AFB sputum x 3 Negative.   Siltzbach Classification pt for possible Sarcoidosis , presentation  is on stage 3.  Serum Angiotensin converting Enzyme ( ACE), Serum Amyloid A, SHAYLA, rheumatoid factor.   Outpatient PFT .  Pulmonary follow up outpatient.  repeat CT Chest in 3-6 Months.    Time of visit:    CHIEF COMPLAINT: Patient is a 71y old  Male who presents with a chief complaint of Dyspnea on exertion (28 Apr 2022 11:41)      HPI:  71M with PMH gallstone pancreatitis s/p cholecystectomy 7/2021 at [facility], p/w increased sputum production x2 wks, dyspnea and chest pain on exertion. Pt states that he developed a productive cough with white sputum about 2 weeks ago associated with ~10lb weight loss (adjusted his belt 1 notch over) and chills. Pt reports dyspnea on exertion with associated left sided chest pain radiating to the left shoulder 4-5/10 after walking 1 block. Denies fever, abdominal pain, nausea/vomiting, diarrhea.     No history of cancer. Pt mother with uterine cancer. No recent travel, no known TB Exposure. (23 Apr 2022 16:53)   Patient seen and examined.     PAST MEDICAL & SURGICAL HISTORY:  Gallstone pancreatitis    S/P cholecystectomy  7/2021 laparoscopic        Allergies    No Known Allergies    Intolerances        MEDICATIONS  (STANDING):  enoxaparin Injectable 40 milliGRAM(s) SubCutaneous every 24 hours  guaiFENesin  milliGRAM(s) Oral every 12 hours  simethicone 80 milliGRAM(s) Chew daily      MEDICATIONS  (PRN):  acetaminophen     Tablet .. 650 milliGRAM(s) Oral every 6 hours PRN Temp greater or equal to 38C (100.4F), Moderate Pain (4 - 6)  benzonatate 100 milliGRAM(s) Oral three times a day PRN Cough   Medications up to date at time of exam.    Medications up to date at time of exam.    FAMILY HISTORY:  FH: asthma (Father)    FH: diabetes mellitus (Father)    FH: arthritis (Mother)        SOCIAL HISTORY  Smoking History: Denies smoking/smoke exposure.   Living Condition: [   ] apartment, [   ] private house  Work History:   Travel History: denies recent travel.   Illicit Substance Use: denies  Alcohol Use: denies    REVIEW OF SYSTEMS:    CONSTITUTIONAL:  No fevers, chills. Denies night sweats. Denies hemoptysis.     HEENT:  Denies diplopia or blurred vision, sore throat or runny nose.    CARDIOVASCULAR:  Denies chest discomfort . No palpitations.    RESPIRATORY:  Presented to ED with Dyspnea worse on exertion.     GASTROINTESTINAL:  Denies abdominal pain, nausea, vomiting or diarrhea.    GENITOURINARY: Denies dysuria, frequency or urgency.    NEUROLOGIC:  No tremors, no seizures .    PSYCHIATRIC:  No emotional distress on exam.     PHYSICAL EXAMINATION:    Vital Signs Last 24 Hrs  T(C): 36.6 (28 Apr 2022 11:22), Max: 36.7 (27 Apr 2022 20:23)  T(F): 97.9 (28 Apr 2022 11:22), Max: 98 (27 Apr 2022 20:23)  HR: 71 (28 Apr 2022 11:22) (62 - 71)  BP: 106/52 (28 Apr 2022 11:22) (106/52 - 112/62)  BP(mean): --  RR: 19 (28 Apr 2022 11:22) (17 - 19)  SpO2: 100% (28 Apr 2022 11:22) (96% - 100%)   (if applicable)    General : Alert and oriented. Able to answer question with no SOB. No acute distress.     HEENT: Head is normocephalic and atraumatic. No nasal tenderness.  Extraocular muscles are intact. Mucous membranes are moist.     NECK: Supple, no palpable adenopathy.    LUNGS: Clear to auscultation bilaterally with no wheezing, rales, or rhonchi. No use of accessory muscle.     HEART: S1 S2 Regular rate and no click/ rub.     ABDOMEN: Soft, nontender, and nondistended. Active bowel sounds.     EXTREMITIES: Without any cyanosis, clubbing, rash, lesions or edema.    NEUROLOGIC: Awake, alert, oriented.     SKIN: Warm and moist. Non diaphoretic.        LABS:                        12.8   7.14  )-----------( 284      ( 28 Apr 2022 07:06 )             39.3     04-28    136  |  101  |  17  ----------------------------<  88  4.1   |  29  |  0.72    Ca    9.2      28 Apr 2022 07:06    MICROBIOLOGY: (if applicable)    RADIOLOGY & ADDITIONAL STUDIES:  EKG:   CT chest :< from: CT Chest w/ IV Cont (04.27.22 @ 20:44) >    ACC: [id number] EXAM:  CT CHEST IC                          PROCEDURE DATE:  04/27/2022          INTERPRETATION:  CLINICAL INFORMATION: Weight loss    COMPARISON: CT abdomen/pelvis July 02, 2021    CONTRAST/COMPLICATIONS:  IV Contrast: Omnipaque [id number] cc administered   10 cc discarded  Oral Contrast: Omnipaque 300  Complications: None reported at time of study completion    PROCEDURE:  CT of the Chest, Abdomen and Pelvis was performed.  Sagittal and coronal reformats were performed.    FINDINGS:  CHEST:  LUNGS AND LARGE AIRWAYS: Patent central airways. Diffuse bilateral   subpleural reticular and groundglass opacities with traction   bronchiectasis.  PLEURA: No pleural effusion.  VESSELS: Atherosclerotic changes of the aorta. Focal calcification of the   left anterior descending coronary artery noted.  HEART: Heart size is normal. No pericardial effusion.  MEDIASTINUM AND SARAH: Bilateral hilar lymphadenopathy for example on the   right measuring 2.2 x 1.6 cm (3; 81) and on the left measuring 2.4 x 1.5   cm (3; 82).  CHEST WALL AND LOWER NECK: Within normal limits.    ABDOMEN AND PELVIS:  LIVER: Left hepatic lobe cyst.  BILE DUCTS: Normal caliber.  GALLBLADDER: Within normal limits.  SPLEEN: Within normal limits.  PANCREAS: Within normal limits.  ADRENALS: Within normal limits.  KIDNEYS/URETERS: Stable subcentimeter hypodense right renal lesion too   small to characterize.    BLADDER: Within normal limits.  REPRODUCTIVE ORGANS: Moderate prostatomegaly with the base of the   prostate indenting upon the bladder.    BOWEL: No bowel obstruction. Appendix is normal.  PERITONEUM: No ascites.  VESSELS: Within normal limits.  RETROPERITONEUM/LYMPH NODES: No lymphadenopathy.  ABDOMINAL WALL: Within normal limits.  BONES: Within normallimits.    IMPRESSION:    Moderate pulmonary fibrosis.  Bilateral hilar lymphadenopathy.  No significant findings in the abdomen/pelvis.          --- End of Report ---            JACLYN LEONARD MD; Attending Radiologist  This document has been electronically signed. Apr 27 2022  9:07PM    < end of copied text >    ECHO:    IMPRESSION: 71y Male PAST MEDICAL & SURGICAL HISTORY:  Gallstone pancreatitis    S/P cholecystectomy  7/2021 laparoscopic    Impression: This is a 72 Y/O Male presented to ED with an increased sputum production x2 wks, dyspnea and chest pain on exertion. Pt states that he developed a productive cough with white sputum about 2 weeks ago associated with ~10lb weight loss (adjusted his belt 1 notch over) and chills. Pt reports dyspnea on exertion with associated left sided chest pain radiating to the left shoulder 4-5/10 after walking 1 block. Positive Quantiferon , RVR, Corona Virus infection but not Covid 19. CT Chest with Moderate Pulmonary Fibrosis. Bilateral Hilar Lymphadenopathy.    Suggestion:  - Possible Pulmonary Sarcoidosis   - Ca and ACE level neg   - AFb x 3 neg   - Repeat CT in 3 months   - May require bronc / EBUS with bx  pending repeat CT chest   - Out pat pul f/u   - F/u connective tissue work up   - Do not need supp O2 at this time

## 2022-04-28 NOTE — DISCHARGE NOTE PROVIDER - CARE PROVIDER_API CALL
Nela Slade  INTERNAL MEDICINE  [address] [place] [address]  Phone: [phone]  Fax: [phone]  Follow Up Time:     Jeevan Arrington)  Cardiovascular Disease  [address] [address]  Phone: [phone]  Fax: [phone]  Follow Up Time:    Nela Slade  INTERNAL MEDICINE  [address] [place] [address]  Phone: [phone]  Fax: [phone]  Follow Up Time:     Jeevan Arrington)  Cardiovascular Disease  [address] [facility] [address]  [place] [address]  Phone: [phone]  Fax: [phone]  Follow Up Time:     Brigette David)  Internal Medicine  [address] [facility], [address]  [place] [address]  Phone: [phone]  Fax: [phone]  Follow Up Time:     Bette Prescott)  Internal Medicine  [address] [facility], [address]rd [place] [address]  Phone: [phone]  Fax: [phone]  Follow Up Time:    Jeevan Arrington)  Cardiovascular Disease  [address] [facility], [address]  [place] [address]  Phone: [phone]  Fax: [phone]  Follow Up Time:     Brigette David)  Internal Medicine  [address] [facility], [address]  [place] [address]  Phone: [phone]  Fax: [phone]  Follow Up Time:     Bette Prescott)  Internal Medicine  [address] [facility], [address]rd [place] [address]  Phone: [phone]  Fax: [phone]  Follow Up Time:

## 2022-04-28 NOTE — PROGRESS NOTE ADULT - REASON FOR ADMISSION
Dyspnea on exertion

## 2022-04-28 NOTE — CONSULT NOTE ADULT - NS ATTEND AMEND GEN_ALL_CORE FT
This is a 72 Y/O Male presented to ED with an increased sputum production x2 wks, dyspnea and chest pain on exertion. Pt states that he developed a productive cough with white sputum about 2 weeks ago associated with ~10lb weight loss (adjusted his belt 1 notch over) and chills. Pt reports dyspnea on exertion with associated left sided chest pain radiating to the left shoulder 4-5/10 after walking 1 block. Positive Quantiferon , RVR, Corona Virus infection but not Covid 19. CT Chest with Moderate Pulmonary Fibrosis. Bilateral Hilar Lymphadenopathy.    Suggestion:  - Possible Pulmonary Sarcoidosis   - Ca and ACE level neg   - AFb x 3 neg   - Repeat CT in 3 months   - May require bronc / EBUS with bx  pending repeat CT chest   - Out pat pul f/u   - F/u connective tissue work up   - Do not need supp O2 at this time

## 2022-04-28 NOTE — DISCHARGE NOTE PROVIDER - CARE PROVIDERS DIRECT ADDRESSES
,[email or web address],DirectAddress_Unknown ,[email or web address],DirectAddress_Unknown,DirectAddress_Unknown,[email or web address] ,DirectAddress_Unknown,DirectAddress_Unknown,[email or web address]

## 2022-04-28 NOTE — PROGRESS NOTE ADULT - PROVIDER SPECIALTY LIST ADULT
Cardiology
Internal Medicine
Infectious Disease
Internal Medicine

## 2022-04-29 LAB
NIGHT BLUE STAIN TISS: SIGNIFICANT CHANGE UP
RHEUMATOID FACT SERPL-ACNC: 123 IU/ML — HIGH (ref 0–13)
SPECIMEN SOURCE: SIGNIFICANT CHANGE UP

## 2022-05-02 LAB
ACE SERPL-CCNC: 28 U/L — SIGNIFICANT CHANGE UP (ref 14–82)
ANA PAT FLD IF-IMP: ABNORMAL
ANA TITR SER: ABNORMAL

## 2022-06-15 LAB
CULTURE RESULTS: SIGNIFICANT CHANGE UP
SPECIMEN SOURCE: SIGNIFICANT CHANGE UP

## 2022-06-18 LAB
CULTURE RESULTS: SIGNIFICANT CHANGE UP
SPECIMEN SOURCE: SIGNIFICANT CHANGE UP

## 2022-10-03 NOTE — DISCHARGE NOTE NURSING/CASE MANAGEMENT/SOCIAL WORK - NSTRANSFERBELONGINGSDISPO_GEN_A_NUR

## 2022-11-07 NOTE — PROGRESS NOTE ADULT - PROBLEM SELECTOR PLAN 3
"Polo"Beth Small was seen and treated in our emergency department on 11/7/2022.  He may return with limitations on 11/14/2022.  No heavy lifting x 1 week     Sincerely,      Shana Peres MD    " -dvt ppx- cont Lovenox

## 2022-11-29 NOTE — ED ADULT NURSE NOTE - NS ED NURSE LEVEL OF CONSCIOUSNESS ORIENTATION
Medication: methylphenidate (METADATE CD) 20 MG ER (CD) capsule    Last refill date: 10/31/22  Last med check: 06/13/22  Med check due: Appt scheduled 12/14/22  Last Well child Check: 04/25/22    PDMP reviewed.  Prescribed: 10/31/22  Dispensed: 10/31/22    
Oriented - self; Oriented - place; Oriented - time

## 2023-03-31 PROBLEM — K85.10 BILIARY ACUTE PANCREATITIS WITHOUT NECROSIS OR INFECTION: Chronic | Status: ACTIVE | Noted: 2022-04-23

## 2023-05-22 ENCOUNTER — APPOINTMENT (OUTPATIENT)
Dept: GASTROENTEROLOGY | Facility: CLINIC | Age: 73
End: 2023-05-22
Payer: MEDICARE

## 2023-05-22 VITALS
BODY MASS INDEX: 23.92 KG/M2 | OXYGEN SATURATION: 97 % | HEIGHT: 62 IN | DIASTOLIC BLOOD PRESSURE: 54 MMHG | HEART RATE: 77 BPM | TEMPERATURE: 98.1 F | SYSTOLIC BLOOD PRESSURE: 91 MMHG | WEIGHT: 130 LBS

## 2023-05-22 DIAGNOSIS — K85.10 BILIARY ACUTE PANCREATITIS WITHOUT NECROSIS OR INFECTION: ICD-10-CM

## 2023-05-22 PROCEDURE — 99214 OFFICE O/P EST MOD 30 MIN: CPT

## 2023-05-22 RX ORDER — POLYETHYLENE GLYCOL 3350 AND ELECTROLYTES WITH LEMON FLAVOR 236; 22.74; 6.74; 5.86; 2.97 G/4L; G/4L; G/4L; G/4L; G/4L
236 POWDER, FOR SOLUTION ORAL
Qty: 1 | Refills: 0 | Status: ACTIVE | COMMUNITY
Start: 2023-05-22 | End: 1900-01-01

## 2023-05-22 RX ORDER — SIMETHICONE 125 MG/1
125 TABLET, CHEWABLE ORAL
Qty: 120 | Refills: 3 | Status: ACTIVE | COMMUNITY
Start: 2023-05-22 | End: 1900-01-01

## 2023-05-22 RX ORDER — FAMOTIDINE 40 MG/1
40 TABLET, FILM COATED ORAL
Qty: 60 | Refills: 3 | Status: ACTIVE | COMMUNITY
Start: 2023-05-22 | End: 1900-01-01

## 2023-05-22 NOTE — HISTORY OF PRESENT ILLNESS
[FreeTextEntry1] : The patient has a past medical history significant for history of gallstone pancreatitis and elevated liver enzymes and lung issues post COVID being followed by pulmonologist, Dr. Josh Ivory and Dr. Rishi Cm.  The patient is being followed by Dr. Llanes. history of gallstone pancreatitis and elevated liver enzymes. The patient denies any prior exposure to hepatitis A, B or C. The patient denies any large doses of nonsteroidal anti-inflammatory drugs or acetaminophen. The patient denies sharing needles, razors, nail clippers, nail files, scissors, et cetera. The patient denies any EtOH abuse, cocaine use or intravenous drug use. The patient denies any prior blood transfusions, sexual indiscretions, tattoos or piercing. The patient admits to having prior surgery. The history of surgery is significant for a prior cholecystectomy. The patient admits to a family history of GI or liver problems. The patient’s sister had a history of gastritis. The patient states that he is feeling tired. The patient denies any jaundice or pruritus.  The patient denies any chronic lower back pain. The patient denies any abdominal pain.  The patient complains of abdominal gas and bloating.  The patient denies any nausea or vomiting.  The patient complains of occasional gastroesophageal reflux disease but denies any dysphagia.  The patient denies taking medications for the gastroesophageal reflux disease.  The gastroesophageal reflux disease is worse after meals and late at night and in the early morning. The patient complains of dyspnea upon exertion but denies any atypical chest pain, shortness of breath or palpitations.  The patient denies any diaphoresis. The patient complains of occasional diarrhea but denies any constipation.  The patient has 1 to 3 bowel movements every 1 to 2 days. The diarrhea is described as soft to watery in nature.   The patient complains of a change in bowel habits.  The patient complains of a change in caliber of stool.   The patient denies having mucus discharge with the bowel movements.  The patient denies any bright red blood per rectum, melena or hematemesis.  The patient complains of occasional rectal pruritus but denies any rectal pain.  The patient complains of weight loss but denies any anorexia.  The patient admits to losing 4 pounds over the past 2 weeks. The patient attributes the weight loss to recent exercise.   He denies any fevers or chills.  The blood work performed on August 4, 2021 revealed a normal alpha-fetoprotein level of < 1.8 ng/ml, a normal amylase/lipase level of 63/30 U/L, respectively, mild anemia with a hemoglobin/hematocrit level of 12.3/40.8, respectively, and elevated potassium level of 5.4 mmol/L, normal liver enzymes with a total bilirubin of 0.4 mg/dL, a normal alkaline phosphatase/AST/ALT/GGTP of 99/18/10/30 4 units/L, respectively, a normal ferritin level of 65 ng/mL, a normal serum iron level of 63 mcg/dL, a normal TIBC/U IBC of 350/287 mcg/dL, respectively, a normal iron percent saturation of 18%, a reactive hepatitis A IgG antibody, and elevated triglyceride level of 154 mg/dL, a normal total cholesterol level of 196 mg/dL, and elevated rheumatoid factor of 19 IU/ml, and elevated ESR of 48 mm/h, a weakly positive transglutaminase IgG antibody of 7.6 U/ml. The stool guaiac was negative for occult blood.  The patient was previously hospitalized at the [facility] on July 2, 2021 for abdominal pain. The patient has a history of pancreatitis. The patient was hospitalized for 4 days for the symptoms. The blood work performed on July 3, 2021 revealed an elevated WBC count of 10.86 K/ul, mild anemia with a hemoglobin of 12.9 g/dL, and elevated glucose of 147 mg/dL, and elevated total protein level of 8.4 g/dL, elevated liver enzymes with an alkaline phosphatase/AST/ALT of 171/367/2 46 units/L, respectively, a normal total bilirubin of 0.8 mg/dL, a markedly elevated lipase level of > 30,000, a normal lactate level of 2.0 mmol/L. The CAT scan of the abdomen and pelvis with IV contrast performed on July 2, 2021 revealed mild peripancreatic stranding again noted suggestive of recurrence of acute pancreatitis with no evidence for pancreatic necrosis. Also noted was a mildly dilated common bile duct. The patient was treated with IV hydration and pain management. The abdominal ultrasound performed on July 3, 2021 revealed suspected acute cholecystitis with cholelithiasis and sludge as well as gallbladder wall thickening. The findings are suggestive of gallstone pancreatitis with acute cholecystitis. The patient is status post laparoscopic cholecystectomy with intraoperative cholangiography on July 6, 2021 with Dr. Juan Francisco Llanes. The patient tolerated the surgery well. The surgical pathology revealed cholelithiasis with acute and chronic cholecystitis and cholesterolosis. The last blood work performed on July 7, 2021 revealed mild anemia with a hemoglobin/hematocrit level of 11.6/36.2, respectively, and elevated blood glucose of 134 mg/dL, elevated but improving liver enzymes with an alkaline phosphatase/AST/ALT of 190/81/1 73 units/L, respectively, a normal total bilirubin of 0.4 mg/dL, a low albumin of 3.1 g/dL, the patient was discharged to home and advised to follow-up in the office. The patient was observed for 4 days with resolution of the symptoms and was discharged to followup in the office. I reviewed the blood work and imaging studies performed during the hospitalization. The patient admits to a family history of GI problems. The patient’s sister had a history of gastritis.\par \par  \par (-) smoking, (-) ETOH, (-) IVDA\par  [de-identified] : The CAT scan of the abdomen and pelvis with IV contrast performed on July 2, 2021 revealed mild peripancreatic stranding again noted suggestive of recurrence of acute pancreatitis with no evidence for pancreatic necrosis. Also noted was a mildly dilated common bile duct.\par \par \par \par  [de-identified] : The abdominal ultrasound performed on July 3, 2021 revealed suspected acute cholecystitis with cholelithiasis and sludge as well as gallbladder wall thickening.

## 2023-05-22 NOTE — ASSESSMENT
[FreeTextEntry1] : Dyspepsia: The patient complains of dyspeptic symptoms.  The patient was advised to continue to abide by an anti-gas (low FOD-MAP) diet.  The patient was previously given a pamphlet for anti-gas (low FOD-MAP).  The patient and I reviewed the anti-gas (low FOD-MAP)diet at length again. The patient is to continue on a trial of Simethicone one tablet 4 times a day p.r.n. abdominal pain and gas.\par GERD: The patient was advised to avoid late-night meals and dietary indiscretions.  The patient was advised to avoid fried and fatty foods.  The patient was advised to abide by an anti-GERD diet. The patient was given a pamphlet for anti-GERD.  The patient and I reviewed the anti-GERD diet at length. I recommend a trial of famotidine 40 mg twice a day x 3 months for the symptoms.\par Diarrhea: The patient complains of diarrhea.  I recommend a low residue diet. The patient is to avoid fiber supplementation. The patient is to consider starting a trial of a probiotic such as Align once a day.   If the symptoms persist, the patient may require sending stool studies for C+S, O+P x3, and C. difficile to assess for an infectious etiology of the diarrhea.  The symptoms are worse after meals.  The patient has a history of cholecystectomy.  I recommend a trial of cholestyramine one packet once a day for possible bile induced diarrhea.   The patient agreed and will follow-up to reassess the symptoms.  \par History of Elevated Liver Enzymes: The patient has elevated liver enzymes noted on prior blood work most likely secondary to gallstone pancreatitis. The patient denies any jaundice or pruritus.  The patient denies any alcohol use.  The patient denies taking large doses of nonsteroidal anti-inflammatory drugs or acetaminophen.  I had a long discussion with the patient regarding the elevated liver enzymes and the possible progression of the liver disease to cirrhosis.  The patient was told of the possible increased risk of developing liver failure, cirrhosis, ascites, GI bleeding secondary to varices, hepatic encephalopathy, bleeding tendencies and liver cancer.  The patient was told of the importance of follow-up.  The patient was advised to follow up every 6 months for blood work and imaging studies.  I recommend avoid alcohol and hepato-toxic agents.  I recommend avoiding large doses of nonsteroidal anti-inflammatory drugs or acetaminophen.  I recommend obtaining the recent blood work performed by his PMD.  I recommend a repeat abdominal ultrasound to reassess the liver parenchyma and for liver lesions.   If the liver enzymes remain elevated, the patient may require a CT guided liver biopsy to assess the liver parenchyma and for possible treatment.  We had a long discussion regarding the risks and benefits of the procedure.  The patient was told of the risks of bleeding, perforation, infections, emergency surgery and missing lesions.  The patient agreed and will follow-up to reassess the symptoms.\par History of Gallstone Pancreatitis: The patient was previously hospitalized at the [facility] on July 2, 2021 for abdominal pain secondary to pancreatitis. The patient was hospitalized for 4 days for the symptoms. The blood work performed on July 3, 2021 revealed an elevated WBC count of 10.86 K/ul, mild anemia with a hemoglobin of 12.9 g/dL, and elevated glucose of 147 mg/dL, an elevated total protein level of 8.4 g/dL, elevated liver enzymes with an alkaline phosphatase/AST/ALT of 171/367/246 units/L, respectively, a normal total bilirubin of 0.8 mg/dL, a markedly elevated lipase level of > 30,000, a normal lactate level of 2.0 mmol/L. The CAT scan of the abdomen and pelvis with IV contrast performed on July 2, 2021 revealed mild peripancreatic stranding again noted suggestive of recurrence of acute pancreatitis with no evidence for pancreatic necrosis. Also noted was a mildly dilated common bile duct. The patient was treated with IV hydration and pain management. The abdominal ultrasound performed on July 3, 2021 revealed suspected acute cholecystitis with cholelithiasis and sludge as well as gallbladder wall thickening. The findings were suggestive of gallstone pancreatitis with acute cholecystitis. The patient is status post laparoscopic cholecystectomy with intraoperative cholangiography on July 6, 2021 with Dr. Juan Francisco Llanes. The patient tolerated the surgery well. The surgical pathology revealed cholelithiasis with acute and chronic cholecystitis and cholesterolosis. The last blood work performed on July 7, 2021 revealed mild anemia with a hemoglobin/hematocrit level of 11.6/36.2, respectively, and elevated blood glucose of 134 mg/dL, elevated but improving liver enzymes with an alkaline phosphatase/AST/ALT of 190/81/1 73 units/L, respectively, a normal total bilirubin of 0.4 mg/dL, a low albumin of 3.1 g/dL, the patient was discharged to home and advised to follow-up in the office. The patient was observed for 4 days with resolution of the symptoms and was discharged to followup in the office.\par Colon Cancer screening: I recommend colonoscopy for colon cancer screening over the age of 45 to assess for colonic polyps. The patient was told of the risks and benefits of the procedure.  The patient was told of the risks of perforation, emergency surgery, bleeding, infections and missed lesions. The patient is to be on a clear liquid diet the entire day prior to the procedure. The patient is to complete the entire prescribed bowel prep the day prior to the procedure as directed. The patient is told not to drive, drink alcohol, use recreational drugs, exercise, or work the day of the procedure.  The patient was told of the need for an escort to accompany the patient home after the procedure. The patient is aware that the procedure may be cancelled if they fail to follow the directions.  The patient agreed and will schedule for the procedure. The patient is to be n.p.o. after midnight and bowel prep was given.  The patient is to return for the procedure.\par Colonoscopy: I recommend a colonoscopy to assess the symptoms and for colonic polyps.  The patient was told of the risks and benefits of the procedure.  The patient was told of the risks of perforation, emergency surgery, bleeding, infections and missed lesions.  The patient is to be on a clear liquid diet the entire day prior to the procedure. The patient is to complete the entire prescribed bowel prep the day prior to the procedure as directed. The patient is told not to drive, drink alcohol, use recreational drugs, exercise, or work the day of the procedure.  The patient was told of the need for an escort to accompany the patient home after the procedure. The patient is aware that the procedure may be cancelled if they fail to follow the directions.  The patient agreed and will schedule for the procedure.  The patient is to be n.p.o. after midnight and bowel prep was given.  The patient is to return for the procedure.\par Imaging Study: I recommend an imaging study to assess the symptoms. I recommend an abdominal ultrasound to assess the liver parenchyma and for liver lesions and pancreas.\par Blood Work: I  recommend obtaining the recent blood work performed by the patient's PMD.\par Follow-up: The patient is to follow-up in the office in 4 weeks to reassess the symptoms. The patient was told to call the office if any further problems. \par \par \par

## 2023-06-21 ENCOUNTER — NON-APPOINTMENT (OUTPATIENT)
Age: 73
End: 2023-06-21

## 2023-06-26 ENCOUNTER — NON-APPOINTMENT (OUTPATIENT)
Age: 73
End: 2023-06-26

## 2023-08-14 ENCOUNTER — RX RENEWAL (OUTPATIENT)
Age: 73
End: 2023-08-14

## 2023-08-14 ENCOUNTER — APPOINTMENT (OUTPATIENT)
Dept: GASTROENTEROLOGY | Facility: CLINIC | Age: 73
End: 2023-08-14
Payer: MEDICARE

## 2023-08-14 VITALS
HEART RATE: 66 BPM | HEIGHT: 62 IN | DIASTOLIC BLOOD PRESSURE: 58 MMHG | WEIGHT: 134 LBS | SYSTOLIC BLOOD PRESSURE: 99 MMHG | TEMPERATURE: 97 F | OXYGEN SATURATION: 95 % | BODY MASS INDEX: 24.66 KG/M2

## 2023-08-14 DIAGNOSIS — D64.9 ANEMIA, UNSPECIFIED: ICD-10-CM

## 2023-08-14 DIAGNOSIS — K21.9 GASTRO-ESOPHAGEAL REFLUX DISEASE W/OUT ESOPHAGITIS: ICD-10-CM

## 2023-08-14 DIAGNOSIS — Z12.11 ENCOUNTER FOR SCREENING FOR MALIGNANT NEOPLASM OF COLON: ICD-10-CM

## 2023-08-14 DIAGNOSIS — R19.8 OTHER SPECIFIED SYMPTOMS AND SIGNS INVOLVING THE DIGESTIVE SYSTEM AND ABDOMEN: ICD-10-CM

## 2023-08-14 DIAGNOSIS — R74.8 ABNORMAL LEVELS OF OTHER SERUM ENZYMES: ICD-10-CM

## 2023-08-14 DIAGNOSIS — R10.13 EPIGASTRIC PAIN: ICD-10-CM

## 2023-08-14 PROCEDURE — 99214 OFFICE O/P EST MOD 30 MIN: CPT

## 2023-08-14 PROCEDURE — ZZZZZ: CPT

## 2023-08-14 RX ORDER — PANTOPRAZOLE 40 MG/1
40 TABLET, DELAYED RELEASE ORAL DAILY
Qty: 90 | Refills: 0 | Status: ACTIVE | COMMUNITY
Start: 2023-08-14 | End: 1900-01-01

## 2023-08-14 RX ORDER — SIMETHICONE 125 MG/1
125 TABLET, CHEWABLE ORAL
Qty: 120 | Refills: 3 | Status: ACTIVE | COMMUNITY
Start: 2023-08-14 | End: 1900-01-01

## 2023-08-14 RX ORDER — PROPRANOLOL HCL 10 MG
10 TABLET ORAL
Refills: 0 | Status: ACTIVE | COMMUNITY

## 2023-08-15 RX ORDER — POLYETHYLENE GLYCOL 3350 AND ELECTROLYTES WITH LEMON FLAVOR 236; 22.74; 6.74; 5.86; 2.97 G/4L; G/4L; G/4L; G/4L; G/4L
236 POWDER, FOR SOLUTION ORAL
Qty: 1 | Refills: 0 | Status: ACTIVE | COMMUNITY
Start: 2023-08-15 | End: 1900-01-01

## 2023-08-15 NOTE — ASSESSMENT
[FreeTextEntry1] : Anemia: The patient was found to have anemia on recent blood work.  The patient denies any bright red blood per rectum, melena or hematemesis.  The patient was guaiac-negative.  I recommend an upper endoscopy and colonoscopy to assess the anemia.  The patient was told of the risks and benefits of the procedure.  The patient was told of the risks of perforation, emergency surgery, bleeding, infections and missed lesions.  The patient agreed and will schedule for the procedure. The patient is to be n.p.o. after midnight and bowel prep was given.  The patient is to return for the procedure.   GERD: The patient was advised to avoid late-night meals and dietary indiscretions.  The patient was advised to avoid fried and fatty foods.  The patient was advised to abide by an anti-GERD diet. The patient was given a pamphlet for anti-GERD.  The patient and I reviewed the anti-GERD diet at length. I recommend a trial of famotidine 40 mg twice a day x 3 months for the symptoms. Dyspepsia: The patient complains of dyspeptic symptoms.  The patient was advised to continue to abide by an anti-gas (low FOD-MAP) diet.  The patient was previously given a pamphlet for anti-gas (low FOD-MAP).  The patient and I reviewed the anti-gas (low FOD-MAP)diet at length again. The patient is to continue on a trial of Simethicone one tablet 4 times a day p.r.n. abdominal pain and gas. History of Elevated Liver Enzymes: The patient has elevated liver enzymes noted on prior blood work most likely secondary to gallstone pancreatitis. The patient denies any jaundice or pruritus. The patient denies any alcohol use. The patient denies taking large doses of nonsteroidal anti-inflammatory drugs or acetaminophen. I had a long discussion with the patient regarding the elevated liver enzymes and the possible progression of the liver disease to cirrhosis. The patient was told of the possible increased risk of developing liver failure, cirrhosis, ascites, GI bleeding secondary to varices, hepatic encephalopathy, bleeding tendencies and liver cancer. The patient was told of the importance of follow-up. The patient was advised to follow up every 6 months for blood work and imaging studies. I recommend avoid alcohol and hepato-toxic agents. I recommend avoiding large doses of nonsteroidal anti-inflammatory drugs or acetaminophen. I recommend obtaining the recent blood work performed by his PMD. I recommend a repeat abdominal ultrasound to reassess the liver parenchyma and for liver lesions. If the liver enzymes remain elevated, the patient may require a CT guided liver biopsy to assess the liver parenchyma and for possible treatment. We had a long discussion regarding the risks and benefits of the procedure. The patient was told of the risks of bleeding, perforation, infections, emergency surgery and missing lesions. The patient agreed and will follow-up to reassess the symptoms. History of Gallstone Pancreatitis: The patient [place] [facility] [place] for abdominal pain secondary to pancreatitis. The patient was hospitalized for 4 days for the symptoms. The blood work performed on July 3, 2021 revealed an elevated WBC count of 10.86 K/ul, mild anemia with a hemoglobin of 12.9 g/dL, and elevated glucose of 147 mg/dL, an elevated total protein level of 8.4 g/dL, elevated liver enzymes with an alkaline phosphatase/AST/ALT of 171/367/246 units/L, respectively, a normal total bilirubin of 0.8 mg/dL, a markedly elevated lipase level of > 30,000, a normal lactate level of 2.0 mmol/L. The CAT scan of the abdomen and pelvis with IV contrast performed on July 2, 2021 revealed mild peripancreatic stranding again noted suggestive of recurrence of acute pancreatitis with no evidence for pancreatic necrosis. Also noted was a mildly dilated common bile duct. The patient was treated with IV hydration and pain management. The abdominal ultrasound performed on July 3, 2021 revealed suspected acute cholecystitis with cholelithiasis and sludge as well as gallbladder wall thickening. The findings were suggestive of gallstone pancreatitis with acute cholecystitis. The patient is status post laparoscopic cholecystectomy with intraoperative cholangiography on July 6, 2021 with Dr. Juan Francisco Llanes. The patient tolerated the surgery well. The surgical pathology revealed cholelithiasis with acute and chronic cholecystitis and cholesterolosis. The last blood work performed on July 7, 2021 revealed mild anemia with a hemoglobin/hematocrit level of 11.6/36.2, respectively, and elevated blood glucose of 134 mg/dL, elevated but improving liver enzymes with an alkaline phosphatase/AST/ALT of 190/81/1 73 units/L, respectively, a normal total bilirubin of 0.4 mg/dL, a low albumin of 3.1 g/dL, the patient was discharged to home and advised to follow-up in the office. The patient was observed for 4 days with resolution of the symptoms and was discharged to followup in the office. Colon Cancer screening: I recommend colonoscopy for colon cancer screening over the age of 45 to assess for colonic polyps. The patient was told of the risks and benefits of the procedure. The patient was told of the risks of perforation, emergency surgery, bleeding, infections and missed lesions. The patient is to be on a clear liquid diet the entire day prior to the procedure. The patient is to complete the entire prescribed bowel prep the day prior to the procedure as directed. The patient is told not to drive, drink alcohol, use recreational drugs, exercise, or work the day of the procedure. The patient was told of the need for an escort to accompany the patient home after the procedure. The patient is aware that the procedure may be cancelled if they fail to follow the directions. The patient agreed and will schedule for the procedure. The patient is to be n.p.o. after midnight and bowel prep was given. The patient is to return for the procedure. Colonoscopy: I recommend a colonoscopy to assess the symptoms and for colonic polyps. The patient was told of the risks and benefits of the procedure. The patient was told of the risks of perforation, emergency surgery, bleeding, infections and missed lesions. The patient is to be on a clear liquid diet the entire day prior to the procedure. The patient is to complete the entire prescribed bowel prep the day prior to the procedure as directed. The patient is told not to drive, drink alcohol, use recreational drugs, exercise, or work the day of the procedure. The patient was told of the need for an escort to accompany the patient home after the procedure. The patient is aware that the procedure may be cancelled if they fail to follow the directions. The patient agreed and will schedule for the procedure. The patient is to be n.p.o. after midnight and bowel prep was given. The patient is to return for the procedure. Blood Work: I reviewed the recent blood work performed by the patient's PMD. Follow-up: The patient is to follow-up in the office in 4 weeks to reassess the symptoms. The patient was told to call the office if any further problems.

## 2023-08-15 NOTE — PHYSICAL EXAM
[Alert] : alert [Healthy Appearing] : healthy appearing [Well Developed] : well developed [Well Nourished] : well nourished [Sclera] : the sclera and conjunctiva were normal [Hearing Threshold Finger Rub Not [place]] : hearing was normal [Normal Appearance] : the appearance of the neck was normal [No Respiratory Distress] : no respiratory distress [Respiration, Rhythm And Depth] : normal respiratory rhythm and effort [Auscultation Breath Sounds / Voice Sounds] : lungs were clear to auscultation bilaterally [Heart Rate And Rhythm] : heart rate was normal and rhythm regular [Normal S1, S2] : normal S1 and S2 [Murmurs] : no murmurs [Heart Sounds Gallop] : no gallops [No Rubs] : no pericardial rub [Normal] : normal bowel sounds, non-tender, no masses, soft, no no hepato-splenomegaly [Bowel Sounds] : normal bowel sounds [Abdomen Tenderness] : non-tender [No Masses] : no abdominal mass palpated [Abdomen Soft] : soft [No CVA Tenderness] : no CVA  tenderness [Involuntary Movements] : no involuntary movements were seen [Normal Color / Pigmentation] : normal skin color and pigmentation [Motor Tone] : muscle strength and tone were normal [] : no rash [Sensation] : the sensory exam was normal to light touch and pinprick [No Focal Deficits] : no focal deficits [Motor Exam] : the motor exam was normal [Oriented To Time, Place, And Person] : oriented to person, place, and time [Normal Affect] : the affect was normal [Normal Mood] : the mood was normal [de-identified] : FROM in all extremities [de-identified] : not done [de-identified] : unsteady gait [de-identified] : dizziness

## 2023-08-15 NOTE — HISTORY OF PRESENT ILLNESS
[FreeTextEntry1] : The patient has a past medical history significant for history of gallstone pancreatitis and elevated liver enzymes and lung issues post COVID being followed by pulmonologist, Dr. Josh Ivory and Dr. Rishi Cm. The patient is being followed by Dr. Llanes for history of gallstone pancreatitis and elevated liver enzymes. The patient denies any prior exposure to hepatitis A, B or C. The patient denies any large doses of nonsteroidal anti-inflammatory drugs or acetaminophen. The patient denies sharing needles, razors, nail clippers, nail files, scissors, et cetera. The patient denies any EtOH abuse, cocaine use or intravenous drug use. The patient denies any prior blood transfusions, sexual indiscretions, tattoos or piercing. The patient admits to having prior surgery. The history of surgery is significant for a prior cholecystectomy. The patient admits to a family history of GI or liver problems. The patient's sister had a history of gastritis. The patient states that he is feeling tired. The patient denies any jaundice or pruritus.  The patient denies any chronic lower back pain. The patient denies any abdominal pain.  The patient complains of abdominal gas and bloating.  The patient denies any nausea or vomiting.  The patient complains of occasional gastroesophageal reflux disease but denies any dysphagia.  The patient denies taking medications for the gastroesophageal reflux disease.  The gastroesophageal reflux disease is worse after meals and late at night and in the early morning. The patient denies any atypical chest pain, shortness of breath or palpitations. The patient admits to occasional episodes of diaphoresis.  The patient complains of alternating diarrhea/constipation.  The patient has 1 bowel movement every 1 to 4 days. The diarrhea is described as soft to watery in nature.   The patient complains of a change in bowel habits.  The patient complains of a change in caliber of stool.   The patient denies having mucus discharge with the bowel movements.  The patient denies any bright red blood per rectum, melena or hematemesis.  The patient complains of occasional rectal pruritus but denies any rectal pain.  The patient complains of fluctuating weight but denies any anorexia.  He denies any fevers or chills.  The abdominal ultrasound performed on June 19, 2023 revealed a limited exam, prior cholecystectomy, inadequate visualization of the pancreas but the study was otherwise unremarkable.  The blood work performed on July 26, 2023 revealed normal liver enzymes and total bilirubin of 0.3 mg/dL, normal alkaline phosphatase/AST/ALT of 77/14/13 U/L, respectively, a normal total cholesterol/triglyceride level of 173/85 mg/dL, respectively, and elevated blood glucose of 109 mg/dL, mild anemia with a hemoglobin/hematocrit level of 11.4/33.9, respectively, and elevated WBC count of 11.45 K/UL, and elevated hemoglobin A1c of 6.2% with an estimated average glucose 138 mg/dL, respectively, a normal TSH of 0.601u IU/mL, a low thyroxine T4 level of 4.7 mcg/dL, a normal T3 resin uptake of 33.4%, a normal free thyroxine level of 0.82 ng/dL, and normal free T4 index of 1.6, a normal T3 total of 75 ng/dL, a normal PSA of 0.78 ng/mL, a normal folic acid of 7.63 ng/mL, a normal vitamin B12 level of 441 pg/mL, a normal free T3 level of 1.7 pg/mL, a normal vitamin D level of 21.1 ng/mL, a normal iron percent saturation of 25%, a normal TIBC of 320 mcg/dL t The urinalysis performed on July 26, 2023 revealed a cloudy character but otherwise unremarkable urinalysis.  The urine culture performed on July 26, 2023 revealed no growth.  The blood work performed on August 4, 2021 revealed a normal alpha-fetoprotein level of < 1.8 ng/ml, a normal amylase/lipase level of 63/30 U/L, respectively, mild anemia with a hemoglobin/hematocrit level of 12.3/40.8, respectively, an elevated potassium level of 5.4 mmol/L, normal liver enzymes with a total bilirubin of 0.4 mg/dL, a normal alkaline phosphatase/AST/ALT/GGTP of 99/18/10/34 units/L, respectively, a normal ferritin level of 65 ng/mL, a normal serum iron level of 63 mcg/dL, a normal TIBC/U IBC of 350/287 mcg/dL, respectively, a normal iron percent saturation of 18%, a reactive hepatitis A IgG antibody, an elevated triglyceride level of 154 mg/dL, a normal total cholesterol level of 196 mg/dL, and elevated rheumatoid factor of 19 IU/ml, and elevated ESR of 48 mm/h, a weakly positive transglutaminase IgG antibody of 7.6 U/ml. The stool guaiac was negative for occult blood. The patient was previously hospitalized at the [facility] on July 2, 2021 for abdominal pain. The patient has a history of pancreatitis. The patient was hospitalized for 4 days for the symptoms. The blood work performed on July 3, 2021 revealed an elevated WBC count of 10.86 K/ul, mild anemia with a hemoglobin of 12.9 g/dL, and elevated glucose of 147 mg/dL, and elevated total protein level of 8.4 g/dL, elevated liver enzymes with an alkaline phosphatase/AST/ALT of 171/367/2 46 units/L, respectively, a normal total bilirubin of 0.8 mg/dL, a markedly elevated lipase level of > 30,000, a normal lactate level of 2.0 mmol/L. The CAT scan of the abdomen and pelvis with IV contrast performed on July 2, 2021 revealed mild peripancreatic stranding again noted suggestive of recurrence of acute pancreatitis with no evidence for pancreatic necrosis. Also noted was a mildly dilated common bile duct. The patient was treated with IV hydration and pain management. The abdominal ultrasound performed on July 3, 2021 revealed suspected acute cholecystitis with cholelithiasis and sludge as well as gallbladder wall thickening. The findings are suggestive of gallstone pancreatitis with acute cholecystitis. The patient is status post laparoscopic cholecystectomy with intraoperative cholangiography on July 6, 2021 with Dr. Juan Francisco Llanes. The patient tolerated the surgery well. The surgical pathology revealed cholelithiasis with acute and chronic cholecystitis and cholesterolosis. The last blood work performed on July 7, 2021 revealed mild anemia with a hemoglobin/hematocrit level of 11.6/36.2, respectively, and elevated blood glucose of 134 mg/dL, elevated but improving liver enzymes with an alkaline phosphatase/AST/ALT of 190/81/173 units/L, respectively, a normal total bilirubin of 0.4 mg/dL, a low albumin of 3.1 g/dL, the patient was discharged to home and advised to follow-up in the office. The patient was observed for 4 days with resolution of the symptoms and was discharged to followup in the office. I reviewed the blood work and imaging studies performed during the hospitalization. The patient admits to a family history of GI problems. The patient's sister had a history of gastritis.  (-) smoking, (-) ETOH, (-) IVDA  [de-identified] : The CAT scan of the abdomen and pelvis with IV contrast performed on July 2, 2021 revealed mild peripancreatic stranding again noted suggestive of recurrence of acute pancreatitis with no evidence for pancreatic necrosis. Also noted was a mildly dilated common bile duct.\par  \par  \par  \par   [de-identified] : The abdominal ultrasound performed on June 19, 2023 revealed a limited exam, prior cholecystectomy, inadequate visualization of the pancreas but the study was otherwise unremarkable.  The abdominal ultrasound performed on July 3, 2021 revealed suspected acute cholecystitis with cholelithiasis and sludge as well as gallbladder wall thickening.

## 2023-09-12 ENCOUNTER — NON-APPOINTMENT (OUTPATIENT)
Age: 73
End: 2023-09-12

## 2023-09-14 ENCOUNTER — APPOINTMENT (OUTPATIENT)
Dept: GASTROENTEROLOGY | Facility: HOSPITAL | Age: 73
End: 2023-09-14

## 2023-10-25 NOTE — ED PROVIDER NOTE - CARDIOVASCULAR [+], MLM
CHEST PAIN [facility]  Urology  [address], [address]  [place] [address]  Phone: [phone]  Fax: [phone]  Follow Up Time:

## 2023-12-04 ENCOUNTER — APPOINTMENT (OUTPATIENT)
Dept: PULMONOLOGY | Facility: CLINIC | Age: 73
End: 2023-12-04

## 2024-08-14 ENCOUNTER — EMERGENCY (EMERGENCY)
Facility: HOSPITAL | Age: 74
LOS: 1 days | Discharge: HOME | End: 2024-08-14
Attending: EMERGENCY MEDICINE
Payer: COMMERCIAL

## 2024-08-14 VITALS
OXYGEN SATURATION: 95 % | RESPIRATION RATE: 18 BRPM | SYSTOLIC BLOOD PRESSURE: 121 MMHG | DIASTOLIC BLOOD PRESSURE: 72 MMHG | HEART RATE: 90 BPM | TEMPERATURE: 98 F

## 2024-08-14 VITALS
TEMPERATURE: 98 F | RESPIRATION RATE: 22 BRPM | SYSTOLIC BLOOD PRESSURE: 115 MMHG | DIASTOLIC BLOOD PRESSURE: 65 MMHG | WEIGHT: 104.06 LBS | OXYGEN SATURATION: 96 % | HEART RATE: 72 BPM

## 2024-08-14 DIAGNOSIS — Z90.49 ACQUIRED ABSENCE OF OTHER SPECIFIED PARTS OF DIGESTIVE TRACT: Chronic | ICD-10-CM

## 2024-08-14 LAB
ANION GAP SERPL CALC-SCNC: 2 MMOL/L — LOW (ref 5–17)
ANION GAP SERPL CALC-SCNC: 7 MMOL/L — SIGNIFICANT CHANGE UP (ref 5–17)
ANISOCYTOSIS BLD QL: SLIGHT — SIGNIFICANT CHANGE UP
BASOPHILS # BLD AUTO: 0 K/UL — SIGNIFICANT CHANGE UP (ref 0–0.2)
BASOPHILS # BLD AUTO: 0.05 K/UL — SIGNIFICANT CHANGE UP (ref 0–0.2)
BASOPHILS NFR BLD AUTO: 0 % — SIGNIFICANT CHANGE UP (ref 0–2)
BASOPHILS NFR BLD AUTO: 0.7 % — SIGNIFICANT CHANGE UP (ref 0–2)
BUN SERPL-MCNC: 21 MG/DL — HIGH (ref 7–18)
BUN SERPL-MCNC: 41 MG/DL — HIGH (ref 7–18)
CALCIUM SERPL-MCNC: 9.1 MG/DL — SIGNIFICANT CHANGE UP (ref 8.4–10.5)
CALCIUM SERPL-MCNC: 9.2 MG/DL — SIGNIFICANT CHANGE UP (ref 8.4–10.5)
CHLORIDE SERPL-SCNC: 102 MMOL/L — SIGNIFICANT CHANGE UP (ref 96–108)
CHLORIDE SERPL-SCNC: 106 MMOL/L — SIGNIFICANT CHANGE UP (ref 96–108)
CO2 SERPL-SCNC: 24 MMOL/L — SIGNIFICANT CHANGE UP (ref 22–31)
CO2 SERPL-SCNC: 34 MMOL/L — HIGH (ref 22–31)
CREAT SERPL-MCNC: 0.52 MG/DL — SIGNIFICANT CHANGE UP (ref 0.5–1.3)
CREAT SERPL-MCNC: 1.92 MG/DL — HIGH (ref 0.5–1.3)
DACRYOCYTES BLD QL SMEAR: SLIGHT — SIGNIFICANT CHANGE UP
EGFR: 107 ML/MIN/1.73M2 — SIGNIFICANT CHANGE UP
EGFR: 37 ML/MIN/1.73M2 — LOW
ELLIPTOCYTES BLD QL SMEAR: SLIGHT — SIGNIFICANT CHANGE UP
EOSINOPHIL # BLD AUTO: 0.05 K/UL — SIGNIFICANT CHANGE UP (ref 0–0.5)
EOSINOPHIL # BLD AUTO: 0.21 K/UL — SIGNIFICANT CHANGE UP (ref 0–0.5)
EOSINOPHIL NFR BLD AUTO: 1 % — SIGNIFICANT CHANGE UP (ref 0–6)
EOSINOPHIL NFR BLD AUTO: 3 % — SIGNIFICANT CHANGE UP (ref 0–6)
GLUCOSE SERPL-MCNC: 117 MG/DL — HIGH (ref 70–99)
GLUCOSE SERPL-MCNC: 91 MG/DL — SIGNIFICANT CHANGE UP (ref 70–99)
HCT VFR BLD CALC: 31.1 % — LOW (ref 39–50)
HCT VFR BLD CALC: 38.5 % — LOW (ref 39–50)
HGB BLD-MCNC: 12.2 G/DL — LOW (ref 13–17)
HGB BLD-MCNC: 9.9 G/DL — LOW (ref 13–17)
HYPOCHROMIA BLD QL: SLIGHT — SIGNIFICANT CHANGE UP
IMM GRANULOCYTES NFR BLD AUTO: 0.1 % — SIGNIFICANT CHANGE UP (ref 0–0.9)
LYMPHOCYTES # BLD AUTO: 0.48 K/UL — LOW (ref 1–3.3)
LYMPHOCYTES # BLD AUTO: 1.63 K/UL — SIGNIFICANT CHANGE UP (ref 1–3.3)
LYMPHOCYTES # BLD AUTO: 10 % — LOW (ref 13–44)
LYMPHOCYTES # BLD AUTO: 23 % — SIGNIFICANT CHANGE UP (ref 13–44)
MANUAL SMEAR VERIFICATION: SIGNIFICANT CHANGE UP
MCHC RBC-ENTMCNC: 23.9 PG — LOW (ref 27–34)
MCHC RBC-ENTMCNC: 29.4 PG — SIGNIFICANT CHANGE UP (ref 27–34)
MCHC RBC-ENTMCNC: 31.7 GM/DL — LOW (ref 32–36)
MCHC RBC-ENTMCNC: 31.8 GM/DL — LOW (ref 32–36)
MCV RBC AUTO: 75.1 FL — LOW (ref 80–100)
MCV RBC AUTO: 92.8 FL — SIGNIFICANT CHANGE UP (ref 80–100)
METAMYELOCYTES # FLD: 4 % — HIGH (ref 0–0)
MONOCYTES # BLD AUTO: 0.81 K/UL — SIGNIFICANT CHANGE UP (ref 0–0.9)
MONOCYTES # BLD AUTO: 1.01 K/UL — HIGH (ref 0–0.9)
MONOCYTES NFR BLD AUTO: 11.4 % — SIGNIFICANT CHANGE UP (ref 2–14)
MONOCYTES NFR BLD AUTO: 21 % — HIGH (ref 2–14)
MYELOCYTES NFR BLD: 1 % — HIGH (ref 0–0)
NEUTROPHILS # BLD AUTO: 3.04 K/UL — SIGNIFICANT CHANGE UP (ref 1.8–7.4)
NEUTROPHILS # BLD AUTO: 4.39 K/UL — SIGNIFICANT CHANGE UP (ref 1.8–7.4)
NEUTROPHILS NFR BLD AUTO: 61.8 % — SIGNIFICANT CHANGE UP (ref 43–77)
NEUTROPHILS NFR BLD AUTO: 63 % — SIGNIFICANT CHANGE UP (ref 43–77)
NRBC # BLD: 0 /100 WBCS — SIGNIFICANT CHANGE UP (ref 0–0)
NRBC # BLD: 0 /100 WBCS — SIGNIFICANT CHANGE UP (ref 0–0)
PLAT MORPH BLD: NORMAL — SIGNIFICANT CHANGE UP
PLATELET # BLD AUTO: 184 K/UL — SIGNIFICANT CHANGE UP (ref 150–400)
PLATELET # BLD AUTO: 207 K/UL — SIGNIFICANT CHANGE UP (ref 150–400)
POIKILOCYTOSIS BLD QL AUTO: SLIGHT — SIGNIFICANT CHANGE UP
POTASSIUM SERPL-MCNC: 3.7 MMOL/L — SIGNIFICANT CHANGE UP (ref 3.5–5.3)
POTASSIUM SERPL-MCNC: 3.8 MMOL/L — SIGNIFICANT CHANGE UP (ref 3.5–5.3)
POTASSIUM SERPL-SCNC: 3.7 MMOL/L — SIGNIFICANT CHANGE UP (ref 3.5–5.3)
POTASSIUM SERPL-SCNC: 3.8 MMOL/L — SIGNIFICANT CHANGE UP (ref 3.5–5.3)
RAPID RVP RESULT: SIGNIFICANT CHANGE UP
RAPID RVP RESULT: SIGNIFICANT CHANGE UP
RBC # BLD: 4.14 M/UL — LOW (ref 4.2–5.8)
RBC # BLD: 4.15 M/UL — LOW (ref 4.2–5.8)
RBC # FLD: 14.8 % — HIGH (ref 10.3–14.5)
RBC # FLD: 15.2 % — HIGH (ref 10.3–14.5)
RBC BLD AUTO: ABNORMAL
SARS-COV-2 RNA SPEC QL NAA+PROBE: SIGNIFICANT CHANGE UP
SARS-COV-2 RNA SPEC QL NAA+PROBE: SIGNIFICANT CHANGE UP
SODIUM SERPL-SCNC: 137 MMOL/L — SIGNIFICANT CHANGE UP (ref 135–145)
SODIUM SERPL-SCNC: 138 MMOL/L — SIGNIFICANT CHANGE UP (ref 135–145)
WBC # BLD: 4.83 K/UL — SIGNIFICANT CHANGE UP (ref 3.8–10.5)
WBC # BLD: 7.1 K/UL — SIGNIFICANT CHANGE UP (ref 3.8–10.5)
WBC # FLD AUTO: 4.83 K/UL — SIGNIFICANT CHANGE UP (ref 3.8–10.5)
WBC # FLD AUTO: 7.1 K/UL — SIGNIFICANT CHANGE UP (ref 3.8–10.5)

## 2024-08-14 PROCEDURE — 80048 BASIC METABOLIC PNL TOTAL CA: CPT

## 2024-08-14 PROCEDURE — 85025 COMPLETE CBC W/AUTO DIFF WBC: CPT

## 2024-08-14 PROCEDURE — 36415 COLL VENOUS BLD VENIPUNCTURE: CPT

## 2024-08-14 PROCEDURE — 99285 EMERGENCY DEPT VISIT HI MDM: CPT

## 2024-08-14 PROCEDURE — 93010 ELECTROCARDIOGRAM REPORT: CPT

## 2024-08-14 PROCEDURE — 0225U NFCT DS DNA&RNA 21 SARSCOV2: CPT

## 2024-08-14 PROCEDURE — 71046 X-RAY EXAM CHEST 2 VIEWS: CPT | Mod: 26

## 2024-08-14 PROCEDURE — 71046 X-RAY EXAM CHEST 2 VIEWS: CPT

## 2024-08-14 PROCEDURE — 93005 ELECTROCARDIOGRAM TRACING: CPT

## 2024-08-14 PROCEDURE — 94640 AIRWAY INHALATION TREATMENT: CPT

## 2024-08-14 PROCEDURE — 96374 THER/PROPH/DIAG INJ IV PUSH: CPT

## 2024-08-14 PROCEDURE — 99285 EMERGENCY DEPT VISIT HI MDM: CPT | Mod: 25

## 2024-08-14 RX ORDER — METHYLPREDNISOLONE ACETATE 40 MG/ML
40 INJECTION, SUSPENSION INTRA-ARTICULAR; INTRALESIONAL; INTRAMUSCULAR; INTRASYNOVIAL; SOFT TISSUE ONCE
Refills: 0 | Status: COMPLETED | OUTPATIENT
Start: 2024-08-14 | End: 2024-08-14

## 2024-08-14 RX ORDER — IPRATROPIUM BROMIDE AND ALBUTEROL SULFATE 2.5; .5 MG/3ML; MG/3ML
3 SOLUTION RESPIRATORY (INHALATION)
Refills: 0 | Status: COMPLETED | OUTPATIENT
Start: 2024-08-14 | End: 2024-08-14

## 2024-08-14 RX ADMIN — METHYLPREDNISOLONE ACETATE 40 MILLIGRAM(S): 40 INJECTION, SUSPENSION INTRA-ARTICULAR; INTRALESIONAL; INTRAMUSCULAR; INTRASYNOVIAL; SOFT TISSUE at 11:34

## 2024-08-14 RX ADMIN — IPRATROPIUM BROMIDE AND ALBUTEROL SULFATE 3 MILLILITER(S): 2.5; .5 SOLUTION RESPIRATORY (INHALATION) at 12:48

## 2024-08-14 RX ADMIN — IPRATROPIUM BROMIDE AND ALBUTEROL SULFATE 3 MILLILITER(S): 2.5; .5 SOLUTION RESPIRATORY (INHALATION) at 11:34

## 2024-08-14 RX ADMIN — IPRATROPIUM BROMIDE AND ALBUTEROL SULFATE 3 MILLILITER(S): 2.5; .5 SOLUTION RESPIRATORY (INHALATION) at 12:00

## 2024-08-14 NOTE — ED PROVIDER NOTE - NSFOLLOWUPINSTRUCTIONS_ED_ALL_ED_FT
Thank you for choosing [facility] for your healthcare.    You were seen in the Emergency Department for shortness of breath.  In the emergency department you had an x-ray blood work and a viral test which was suggestive of worsening of your chronic pulmonary fibrosis but no evidence of an acute infection requiring hospitalization or treatment at this time.  We recommend you follow-up closely with your pulmonologist to see if any of your medications need to be changed for long-term management of your symptoms.  Please return to the emergency department for any further emergent or concerning medical issues.    Ruth por elegir [facility] para soto atención médica.    Lo atendieron en el Departamento de Emergencias por dificultad para respirar.  En el departamento de emergencias le hicieron un análisis de bernda por jovana X y bella prueba viral que sugería un empeoramiento de soto fibrosis pulmonar crónica, ольга no había evidencia de bella infección aguda que requiriera hospitalización o tratamiento en marty momento.  Le recomendamos que realice un seguimiento estrecho con soto neumólogo para taiwo si es necesario cambiar alguno de wes medicamentos para el control a tj plazo de wes síntomas.  Regrese al departamento de emergencias si tiene algún problema médico emergente o preocupante.

## 2024-08-14 NOTE — ED PROVIDER NOTE - PATIENT PORTAL LINK FT
You can access the FollowMyHealth Patient Portal offered by [facility] by registering at the following website: [email or web address]. By joining [facility]’s FollowMyHealth portal, you will also be able to view your health information using other applications (apps) compatible with our system.

## 2024-08-14 NOTE — ED ADULT NURSE NOTE - NSFALLRISKINTERV_ED_ALL_ED

## 2024-08-14 NOTE — ED PROVIDER NOTE - OBJECTIVE STATEMENT
73-year-old male with a past medical history of pulmonary fibrosis secondary to underlying rheumatic disease presenting with progressively worsening shortness of breath over the last 1 to 2 weeks.  No reported fevers or chills or sick contacts.  No associated chest pains.  Reporting compliance with home medications.  Unable to see their pulmonologist for another month so came to the emergency department for further evaluation.

## 2024-08-14 NOTE — ED PROVIDER NOTE - CLINICAL SUMMARY MEDICAL DECISION MAKING FREE TEXT BOX
Patient presenting with progressive dyspnea in setting of known pulmonary fibrosis.  Will obtain screening x-ray, labs and RVP to evaluate for possible acute underlying trigger of worsening disease.  Could also be progression of underlying pulmonary fibrosis.  Will treat with nebulizers and Solu-Medrol

## 2024-08-14 NOTE — ED PROVIDER NOTE - PROGRESS NOTE DETAILS
Nurse went to go draw blood samples.  Patient reporting no blood drawn and family confirming at bedside.  Results are present on this orders from 11:09AM - could be mislabeled specimen from different patient?  Will re-order/draw new labs at this time. Initial lab results from todays visit confirmed by nursing staff to be mislabeled.  Laboratory informed of mislabeled samples - will void results. Patient reporting feeling improved with treatment.  No pneumonia noted on my interpretation of the x-ray, RVP negative.  Suspect worsening of baseline pulmonary fibrosis.  Will discharge with recommendation to follow-up closely with his pulmonologist.

## 2024-08-14 NOTE — ED PROVIDER NOTE - PHYSICAL EXAMINATION
Exam:  General: Patient well appearing, vital signs within normal limits  HEENT: airway patent with moist mucous membranes  Cardiac: RRR S1/S2 with strong peripheral pulses  Respiratory: diffuse fine rales consistent with known pulmonary fibrosis  GI: abdomen soft, non tender, non distended  Neuro: no gross neurologic deficits  Skin: warm, well perfused  Psych: normal mood and affect

## 2024-08-14 NOTE — ED ADULT NURSE NOTE - OBJECTIVE STATEMENT
c/o shortness of breath x couple of days, recently dx with pulmonary fibrosis/on acute distress noted /safety maintained

## 2025-01-08 NOTE — ED ADULT NURSE NOTE - PRIMARY CARE PROVIDER
With respect to the patient's right intertrochanteric femur fracture, he is encouraged to continue advancing his activity and weightbearing as he tolerates.     PLAN:  The patient will follow-up on an as-needed basis.   unkwn

## 2025-04-03 NOTE — PATIENT PROFILE ADULT - NSPROPTRIGHTCAREGIVER_GEN_A_NUR
4/3/2025      Heydi Barriga  [address]  [place] [id number]        No notes on file      Sincerely,        MYKE Colón CNP    Electronically signed  
yes